# Patient Record
Sex: FEMALE | Race: ASIAN | NOT HISPANIC OR LATINO | Employment: FULL TIME | URBAN - NONMETROPOLITAN AREA
[De-identification: names, ages, dates, MRNs, and addresses within clinical notes are randomized per-mention and may not be internally consistent; named-entity substitution may affect disease eponyms.]

---

## 2018-02-10 ENCOUNTER — HOSPITAL ENCOUNTER (OUTPATIENT)
Facility: MEDICAL CENTER | Age: 53
End: 2018-02-10
Attending: NURSE PRACTITIONER
Payer: COMMERCIAL

## 2018-02-10 ENCOUNTER — OFFICE VISIT (OUTPATIENT)
Dept: URGENT CARE | Facility: PHYSICIAN GROUP | Age: 53
End: 2018-02-10
Payer: COMMERCIAL

## 2018-02-10 VITALS
SYSTOLIC BLOOD PRESSURE: 112 MMHG | BODY MASS INDEX: 28.34 KG/M2 | WEIGHT: 154 LBS | OXYGEN SATURATION: 95 % | DIASTOLIC BLOOD PRESSURE: 66 MMHG | HEART RATE: 76 BPM | HEIGHT: 62 IN | RESPIRATION RATE: 16 BRPM | TEMPERATURE: 98 F

## 2018-02-10 DIAGNOSIS — J45.21 MILD INTERMITTENT ASTHMA WITH ACUTE EXACERBATION: ICD-10-CM

## 2018-02-10 DIAGNOSIS — R30.0 DYSURIA: ICD-10-CM

## 2018-02-10 LAB
APPEARANCE UR: NORMAL
BILIRUB UR STRIP-MCNC: NORMAL MG/DL
COLOR UR AUTO: NORMAL
GLUCOSE UR STRIP.AUTO-MCNC: NORMAL MG/DL
KETONES UR STRIP.AUTO-MCNC: NORMAL MG/DL
LEUKOCYTE ESTERASE UR QL STRIP.AUTO: NORMAL
NITRITE UR QL STRIP.AUTO: NORMAL
PH UR STRIP.AUTO: 6.5 [PH] (ref 5–8)
PROT UR QL STRIP: NORMAL MG/DL
RBC UR QL AUTO: NORMAL
SP GR UR STRIP.AUTO: 1.01
UROBILINOGEN UR STRIP-MCNC: NORMAL MG/DL

## 2018-02-10 PROCEDURE — 81002 URINALYSIS NONAUTO W/O SCOPE: CPT | Performed by: NURSE PRACTITIONER

## 2018-02-10 PROCEDURE — 99214 OFFICE O/P EST MOD 30 MIN: CPT | Mod: 25 | Performed by: NURSE PRACTITIONER

## 2018-02-10 PROCEDURE — 87086 URINE CULTURE/COLONY COUNT: CPT

## 2018-02-10 PROCEDURE — 94640 AIRWAY INHALATION TREATMENT: CPT | Performed by: NURSE PRACTITIONER

## 2018-02-10 RX ORDER — PREDNISONE 20 MG/1
60 TABLET ORAL DAILY
Qty: 15 TAB | Refills: 0 | Status: SHIPPED | OUTPATIENT
Start: 2018-02-10 | End: 2018-04-17

## 2018-02-10 RX ORDER — ALBUTEROL SULFATE 90 UG/1
2 AEROSOL, METERED RESPIRATORY (INHALATION) EVERY 6 HOURS PRN
COMMUNITY
End: 2018-04-17

## 2018-02-10 RX ORDER — IPRATROPIUM BROMIDE AND ALBUTEROL SULFATE 2.5; .5 MG/3ML; MG/3ML
3 SOLUTION RESPIRATORY (INHALATION) ONCE
Status: COMPLETED | OUTPATIENT
Start: 2018-02-10 | End: 2018-02-10

## 2018-02-10 RX ORDER — IPRATROPIUM BROMIDE AND ALBUTEROL SULFATE 2.5; .5 MG/3ML; MG/3ML
3 SOLUTION RESPIRATORY (INHALATION) 4 TIMES DAILY
Qty: 30 BULLET | Refills: 0 | Status: SHIPPED | OUTPATIENT
Start: 2018-02-10 | End: 2018-03-28 | Stop reason: SDUPTHER

## 2018-02-10 RX ADMIN — IPRATROPIUM BROMIDE AND ALBUTEROL SULFATE 3 ML: 2.5; .5 SOLUTION RESPIRATORY (INHALATION) at 13:27

## 2018-02-10 NOTE — PATIENT INSTRUCTIONS
Urinary Tract Infection  A urinary tract infection (UTI) can occur any place along the urinary tract. The tract includes the kidneys, ureters, bladder, and urethra. A type of germ called bacteria often causes a UTI. UTIs are often helped with antibiotic medicine.   HOME CARE   · If given, take antibiotics as told by your doctor. Finish them even if you start to feel better.  · Drink enough fluids to keep your pee (urine) clear or pale yellow.  · Avoid tea, drinks with caffeine, and bubbly (carbonated) drinks.  · Pee often. Avoid holding your pee in for a long time.  · Pee before and after having sex (intercourse).  · Wipe from front to back after you poop (bowel movement) if you are a woman. Use each tissue only once.  GET HELP RIGHT AWAY IF:   · You have back pain.  · You have lower belly (abdominal) pain.  · You have chills.  · You feel sick to your stomach (nauseous).  · You throw up (vomit).  · Your burning or discomfort with peeing does not go away.  · You have a fever.  · Your symptoms are not better in 3 days.  MAKE SURE YOU:   · Understand these instructions.  · Will watch your condition.  · Will get help right away if you are not doing well or get worse.     This information is not intended to replace advice given to you by your health care provider. Make sure you discuss any questions you have with your health care provider.     Document Released: 06/05/2009 Document Revised: 01/08/2016 Document Reviewed: 07/18/2013  [employer] Patient Education ©2016 [employer]    Your medical care was provided today by: SAGRARIO Corea    Thank You for the opportunity to serve you.    You may receive a brief survey in the mail shortly regarding your visit today. Please take a few moments to complete the survey and return it; no postage is necessary. We are working to serve our patient population better, improve customer service and our patients overall experience and your input can help us to  accomplish this. We thank you for your help and for the opportunity to serve you today and in the future.     Special Instructions:  Always call 9-1-1 immediately if you develop a life threatening emergency.    Unless told otherwise please take all medications as directed and complete prescription therapies.     Watch for the following signs that require additional evaluation: progressive lethargy or unresponsiveness, localized pain (chest, abdomen), shortness of breath, painful breathing, progressive vomiting with weakness, bloody stools, or new rash.     If you are prescribed pain medication or any other medication that is sedating, do not take medication before or while operating a vehicle or heavy machinery or equipment due to potential side effects such as drowsiness and/or dizziness.

## 2018-02-10 NOTE — ASSESSMENT & PLAN NOTE
This is a chronic problem. Patient reports for about 1 week she has had some cold-like symptoms. Her voice is hoarse. She has a nebulizer at home, she has been using more frequently over the last week. Typically she only has exercise induced asthma. Denies any recent hospitalization. Reports cough, wheezing. Denies ear pain, sore throat. No fever.

## 2018-02-10 NOTE — PROGRESS NOTES
Subjective:     Kev Garcia is a 52 y.o. female here today for new onset urinary complaints as well as complaints of asthma exacerbation.    This is a new problem.   Dysuria? Yes   Frequency? Yes   Blood in urine? no  Low back pain? no   Fever/chills? No   Vaginal discharge? No   Treatments so far include AZO    Mild intermittent asthma with acute exacerbation  This is a chronic problem. Patient reports for about 1 week she has had some cold-like symptoms. Her voice is hoarse. She has a nebulizer at home, she has been using more frequently over the last week. Typically she only has exercise induced asthma. Denies any recent hospitalization. Reports cough, wheezing. Denies ear pain, sore throat. No fever.     Dysuria  This is a new problem. Patient also reports for the last 3 days she has had some urinary frequency and dysuria.       The patient reports the following genitourinary tract medical history:    Symptoms similar to previous UTIs: yes  Prior Urinary Tract Infections: none in the last year   Prior hospitalizations: No   Prior pyelonephritis: No       Current medicines (including changes today)  Current Outpatient Prescriptions   Medication Sig Dispense Refill   • albuterol (VENTOLIN HFA) 108 (90 Base) MCG/ACT Aero Soln inhalation aerosol Inhale 2 Puffs by mouth every 6 hours as needed for Shortness of Breath.     • predniSONE (DELTASONE) 20 MG Tab Take 3 Tabs by mouth every day. 15 Tab 0   • Levothyroxine Sodium (SYNTHROID PO) Take  by mouth.     • Sertraline HCl (ZOLOFT PO) Take  by mouth.       No current facility-administered medications for this visit.        She  has a past medical history of Anxiety; Asthma; Carotid artery narrowing; and Thyroid disease.    She  has no past surgical history on file.    Family History   Problem Relation Age of Onset   • No Known Problems Mother    • No Known Problems Father    • Asthma Sister          ROS   Positive for urinary frequency, burning with urination. No  "vaginal discharge. Positive for wheezing, hoarse voice, cough.   No fever, no chest pain, no abdominal pain, no rashes    All other systems reviewed and are negative.        Objective:     Blood pressure 112/66, pulse 76, temperature 36.7 °C (98 °F), resp. rate 16, height 1.575 m (5' 2\"), weight 69.9 kg (154 lb), SpO2 93 %. Body mass index is 28.17 kg/m².    Physical Exam:   Constitutional: Alert, no distress.  Eye: Equal, round and reactive, conjunctiva clear, lids normal.  Neck: Trachea midline.  No cervical or supraclavicular lymphadenopathy  Respiratory: Unlabored respiratory effort, lungs clear to auscultation, bilateral wheezes, no ronchi.  Cardiovascular: Normal S1, S2, no murmur, no edema.   Abdomen: Soft, non-tender, no masses, no hepatosplenomegaly. Normal bowel sounds. No  CVAT.   no suprapubic tenderness to palpation.   Skin: Warm, dry, good turgor, no rashes in visible areas.  Psych: Alert and oriented x3, normal affect and mood.        Assessment and Plan:   The following treatment plan was discussed      1. Mild intermittent asthma with acute exacerbation  Patient appears to improve with bed treatment. Will treat with steroid as well. Discussed s/s to seek emergent care.   - ipratropium-albuterol (DUONEB) nebulizer solution 3 mL; 3 mL by Nebulization route Once.  - predniSONE (DELTASONE) 20 MG Tab; Take 3 Tabs by mouth every day.  Dispense: 15 Tab; Refill: 0    2. Dysuria  This was a secondary complaint patient did not make until she was seen by me. UA shows trace blood, however, at this time will await culture to treat. Advised patient if worsening, to RTC. I have advised she est care with a PCP since she does not currently have one.   - POCT Urinalysis  - Urine Culture; Future      Advised to increase fluids  Take medications as directed   Follow up if symptoms persist beyond 3 days.  Follow up sooner if getting worse.  Urine sent for culture and sensitivity--will contact patient if results indicate " need for treatment     Reviewed indication, dosage, usage and potential adverse effects of prescribed medications. Patient appears to understand, verbalizes understanding and is willing to try medications as prescribed.      Reviewed risks and benefits of treatment plan. Patient verbally agrees to plan of care.       Followup: Return if symptoms worsen or fail to improve.    NAVID Velez.     PLEASE NOTE: This dictation was created using voice recognition software. I have made every reasonable attempt to correct obvious errors, but I expect that there may be errors of grammar and possibly content that I did not discover prior finalizing this note.

## 2018-02-10 NOTE — ASSESSMENT & PLAN NOTE
This is a new problem. Patient also reports for the last 3 days she has had some urinary frequency and dysuria.

## 2018-02-12 DIAGNOSIS — R30.0 DYSURIA: ICD-10-CM

## 2018-02-14 LAB
BACTERIA UR CULT: NORMAL
SIGNIFICANT IND 70042: NORMAL
SITE SITE: NORMAL
SOURCE SOURCE: NORMAL

## 2018-03-28 ENCOUNTER — OFFICE VISIT (OUTPATIENT)
Dept: URGENT CARE | Facility: PHYSICIAN GROUP | Age: 53
End: 2018-03-28
Payer: COMMERCIAL

## 2018-03-28 VITALS
BODY MASS INDEX: 29.26 KG/M2 | OXYGEN SATURATION: 96 % | WEIGHT: 159 LBS | TEMPERATURE: 97.4 F | HEART RATE: 92 BPM | HEIGHT: 62 IN | RESPIRATION RATE: 14 BRPM | DIASTOLIC BLOOD PRESSURE: 86 MMHG | SYSTOLIC BLOOD PRESSURE: 122 MMHG

## 2018-03-28 DIAGNOSIS — J45.21 MILD INTERMITTENT ASTHMA WITH ACUTE EXACERBATION: ICD-10-CM

## 2018-03-28 PROCEDURE — 99214 OFFICE O/P EST MOD 30 MIN: CPT | Mod: 25 | Performed by: PHYSICIAN ASSISTANT

## 2018-03-28 RX ORDER — IPRATROPIUM BROMIDE AND ALBUTEROL SULFATE 2.5; .5 MG/3ML; MG/3ML
3 SOLUTION RESPIRATORY (INHALATION) 4 TIMES DAILY
Qty: 30 BULLET | Refills: 0 | Status: SHIPPED | OUTPATIENT
Start: 2018-03-28 | End: 2018-04-17

## 2018-03-28 RX ORDER — ALBUTEROL SULFATE 90 UG/1
1-2 AEROSOL, METERED RESPIRATORY (INHALATION) EVERY 4 HOURS PRN
Qty: 1 INHALER | Refills: 0 | Status: SHIPPED | OUTPATIENT
Start: 2018-03-28 | End: 2018-10-15 | Stop reason: SDUPTHER

## 2018-03-28 RX ORDER — METHYLPREDNISOLONE SODIUM SUCCINATE 125 MG/2ML
125 INJECTION, POWDER, LYOPHILIZED, FOR SOLUTION INTRAMUSCULAR; INTRAVENOUS ONCE
Status: COMPLETED | OUTPATIENT
Start: 2018-03-28 | End: 2018-03-28

## 2018-03-28 RX ORDER — AZITHROMYCIN 250 MG/1
TABLET, FILM COATED ORAL
Qty: 6 TAB | Refills: 0 | Status: SHIPPED | OUTPATIENT
Start: 2018-03-28 | End: 2018-04-17

## 2018-03-28 RX ORDER — PREDNISONE 20 MG/1
TABLET ORAL
Qty: 10 TAB | Refills: 0 | Status: SHIPPED | OUTPATIENT
Start: 2018-03-28 | End: 2018-04-17

## 2018-03-28 RX ADMIN — METHYLPREDNISOLONE SODIUM SUCCINATE 125 MG: 125 INJECTION, POWDER, LYOPHILIZED, FOR SOLUTION INTRAMUSCULAR; INTRAVENOUS at 13:30

## 2018-03-28 ASSESSMENT — ENCOUNTER SYMPTOMS
FREQUENT THROAT CLEARING: 0
HOARSE VOICE: 0
DIFFICULTY BREATHING: 1
SORE THROAT: 1
HEMOPTYSIS: 0
WHEEZING: 1
COUGH: 1
SPUTUM PRODUCTION: 0
RHINORRHEA: 1
CHEST TIGHTNESS: 1
SHORTNESS OF BREATH: 1

## 2018-03-28 NOTE — PROGRESS NOTES
"Subjective:      Kev Garcia is a 52 y.o. female who presents with Asthma            Asthma   She complains of chest tightness, cough, difficulty breathing, shortness of breath and wheezing. There is no frequent throat clearing, hemoptysis, hoarse voice or sputum production. This is a new problem. The current episode started 1 to 4 weeks ago. The problem occurs intermittently. The problem has been waxing and waning. The cough is non-productive. Associated symptoms include ear congestion, nasal congestion, rhinorrhea and a sore throat. Her symptoms are aggravated by URI. Her symptoms are alleviated by beta-agonist and oral steroids. She reports moderate improvement on treatment. Her past medical history is significant for asthma.       Review of Systems   HENT: Positive for rhinorrhea and sore throat. Negative for hoarse voice.    Respiratory: Positive for cough, shortness of breath and wheezing. Negative for hemoptysis and sputum production.           Objective:     /86   Pulse 92   Temp 36.3 °C (97.4 °F)   Resp 14   Ht 1.575 m (5' 2\")   Wt 72.1 kg (159 lb)   SpO2 96%   BMI 29.08 kg/m²      Physical Exam       Gen.: Patient is A and O ×3, no acute distress, well-nourished well-hydrated  Vitals: Are listed and unremarkable  HEENT: Heads normocephalic, atraumatic, PERRLA, extraocular movements intact, TMs and oropharynx clear  Neck: Soft supple without cervical lymphadenopathy  Cardiovascular: Regular rate and rhythm normal S1 and S2. No murmurs, rubs or gallops  Lungs are clear to auscultation bilaterally. no wheezes rales or rhonchi  Abdomen is soft, nontender, nondistended with good bowel sounds, no hepatosplenomegaly  Skin: Is well perfused without evidence of rash or lesions  Neurological:  cranial nerves II through XII were assessed and intact.  Musculoskeletal: Full range of motion, 5 out of 5 strength against resistance  Neurovascularly: Intact with a 2 second cap refill, good distal " pulses      Urgent care course: solumedrol 125mg IMx1 given. Patient defers breathing treatment here     Assessment/Plan:     1. Mild intermittent asthma with acute exacerbation    - ipratropium-albuterol (DUONEB) 0.5-2.5 (3) MG/3ML nebulizer solution; 3 mL by Nebulization route 4 times a day.  Dispense: 30 Bullet; Refill: 0  - predniSONE (DELTASONE) 20 MG Tab; Take one pill twice a day for five days  Dispense: 10 Tab; Refill: 0  - azithromycin (ZITHROMAX) 250 MG Tab; Take two tablets on day one, then on tablet the following four days  Dispense: 6 Tab; Refill: 0  - albuterol 108 (90 Base) MCG/ACT Aero Soln inhalation aerosol; Inhale 1-2 Puffs by mouth every four hours as needed for Shortness of Breath.  Dispense: 1 Inhaler; Refill: 0  - methylPREDNISolone sod succ (SOLU-MEDROL) 125 MG injection 125 mg; 2 mL by Intramuscular route Once.

## 2018-04-17 ENCOUNTER — TELEPHONE (OUTPATIENT)
Dept: URGENT CARE | Facility: PHYSICIAN GROUP | Age: 53
End: 2018-04-17

## 2018-04-17 ENCOUNTER — APPOINTMENT (OUTPATIENT)
Dept: RADIOLOGY | Facility: IMAGING CENTER | Age: 53
End: 2018-04-17
Attending: NURSE PRACTITIONER
Payer: COMMERCIAL

## 2018-04-17 ENCOUNTER — OFFICE VISIT (OUTPATIENT)
Dept: MEDICAL GROUP | Facility: PHYSICIAN GROUP | Age: 53
End: 2018-04-17
Payer: COMMERCIAL

## 2018-04-17 VITALS
WEIGHT: 154 LBS | HEIGHT: 62 IN | BODY MASS INDEX: 28.34 KG/M2 | TEMPERATURE: 98.4 F | SYSTOLIC BLOOD PRESSURE: 122 MMHG | DIASTOLIC BLOOD PRESSURE: 70 MMHG | RESPIRATION RATE: 14 BRPM | HEART RATE: 112 BPM | OXYGEN SATURATION: 95 %

## 2018-04-17 DIAGNOSIS — F41.9 ANXIETY: ICD-10-CM

## 2018-04-17 DIAGNOSIS — R05.9 COUGH: ICD-10-CM

## 2018-04-17 DIAGNOSIS — J45.41 MODERATE PERSISTENT ASTHMA WITH ACUTE EXACERBATION: ICD-10-CM

## 2018-04-17 DIAGNOSIS — R61 NIGHT SWEAT: ICD-10-CM

## 2018-04-17 DIAGNOSIS — Z13.21 ENCOUNTER FOR VITAMIN DEFICIENCY SCREENING: ICD-10-CM

## 2018-04-17 DIAGNOSIS — E03.9 ACQUIRED HYPOTHYROIDISM: ICD-10-CM

## 2018-04-17 DIAGNOSIS — Z13.6 SCREENING FOR CARDIOVASCULAR CONDITION: ICD-10-CM

## 2018-04-17 PROCEDURE — 99214 OFFICE O/P EST MOD 30 MIN: CPT | Performed by: NURSE PRACTITIONER

## 2018-04-17 PROCEDURE — 71046 X-RAY EXAM CHEST 2 VIEWS: CPT | Mod: TC,FY | Performed by: NURSE PRACTITIONER

## 2018-04-17 RX ORDER — FLUTICASONE PROPIONATE 220 UG/1
1 AEROSOL, METERED RESPIRATORY (INHALATION) 2 TIMES DAILY
Qty: 1 INHALER | Refills: 4 | Status: SHIPPED | OUTPATIENT
Start: 2018-04-17 | End: 2018-04-17 | Stop reason: CLARIF

## 2018-04-17 RX ORDER — IPRATROPIUM BROMIDE AND ALBUTEROL SULFATE 2.5; .5 MG/3ML; MG/3ML
3 SOLUTION RESPIRATORY (INHALATION) 4 TIMES DAILY
Qty: 30 BULLET | Refills: 2 | Status: SHIPPED | OUTPATIENT
Start: 2018-04-17 | End: 2022-07-20 | Stop reason: SDUPTHER

## 2018-04-17 NOTE — TELEPHONE ENCOUNTER
Patient's FLovent inhaler cost $400 at the pharmacy, patient is wondering if she could have it changed to advair which only costs $50.00. Please advise thank you.

## 2018-04-17 NOTE — ASSESSMENT & PLAN NOTE
Patient reports she has had exercise-induced asthma for years.  Since she had pneumonia 3 years ago, she has been getting asthma exacerbations with respiratory illness.  She has been to urgent care twice last 2 months. Last urgent care visit, she was given an IM injection of steroid and prescribed oral prednisone, azithromycin, and DuoNeb nebulizer.  Patient reports she had temporary relief until taking oral prednisone. She reports her cough has returned and is triggered by deep breath, laughing, dry throat.  She reports that her cough gets so severe, causing her nose to bleed.  She has been using both duoneb nebulizer and albuterol inhaler. Patient has been using both multiple times a day with some relief. I did explain to patient that she should either use DuoNeb nebulizer or albuterol inhaler every 4 hours, not both.  Her heart rate is 108, likely due to albuterol. Patient denies chest pain, lightheadedness.  Patient denies shortness of breath with exertion. We'll get chest x-ray. Will have patient start Flovent inhaler routinely. Patient also reports new onset of night sweats that soak pillowcase. Will also get a Quantiferon Gold.  Patient has not traveled out of country recently, but did just travel to [place].  No one else at home is ill.

## 2018-04-17 NOTE — PROGRESS NOTES
CC: To establish care and for cough, medication refills for anxiety and hypothyroid.    HISTORY OF THE PRESENT ILLNESS: Patient is a 52 y.o. female. This pleasant patient is here today to establish care and for cough, and medication refills for anxiety and hypothyroid.      Moderate persistent asthma with acute exacerbation  Patient reports she has had exercise-induced asthma for years.  Since she had pneumonia 3 years ago, she has been getting asthma exacerbations with respiratory illness.  She has been to urgent care twice last 2 months. Last urgent care visit, she was given an IM injection of steroid and prescribed oral prednisone, azithromycin, and DuoNeb nebulizer.  Patient reports she had temporary relief until taking oral prednisone. She reports her cough has returned and is triggered by deep breath, laughing, dry throat.  She reports that her cough gets so severe, causing her nose to bleed.  She has been using both duoneb nebulizer and albuterol inhaler. Patient has been using both multiple times a day with some relief. I did explain to patient that she should either use DuoNeb nebulizer or albuterol inhaler every 4 hours, not both.  Her heart rate is 108, likely due to albuterol. Patient denies chest pain, lightheadedness.  Patient denies shortness of breath with exertion. We'll get chest x-ray. Will have patient start Flovent inhaler routinely. Patient also reports new onset of night sweats that soak pillowcase. Will also get a Quantiferon Gold.  Patient has not traveled out of country recently, but did just travel to [place].  No one else at home is ill.    Anxiety  Patient reports this is a chronic problem that is well-controlled with sertraline 50 mg daily. Patient reports she has been taking this for a few years. She denies side effects and suicidal thoughts.    Acquired hypothyroidism  Patient reports this is a chronic problem that has been well controlled with levothyroxine 137 µg daily. She denies  constipation, cold intolerance, brain fog, skin and hair changes. She reports her last TSH was about a year ago. Will get updated TSH before refilling medication. Patient reports she has 5 days left of medication.      Allergies: Patient has no known allergies.    Current Outpatient Prescriptions [facility] in [facility]   Medication Sig Dispense Refill   • fluticasone (FLOVENT HFA) 220 MCG/ACT Aerosol Inhale 1 Puff by mouth 2 times a day. 1 Inhaler 4   • ipratropium-albuterol (DUONEB) 0.5-2.5 (3) MG/3ML nebulizer solution 3 mL by Nebulization route 4 times a day. 30 Bullet 2   • sertraline (ZOLOFT) 50 MG Tab Take 1 Tab by mouth every day. 30 Tab 11   • albuterol 108 (90 Base) MCG/ACT Aero Soln inhalation aerosol Inhale 1-2 Puffs by mouth every four hours as needed for Shortness of Breath. 1 Inhaler 0   • Levothyroxine Sodium (SYNTHROID PO) Take  by mouth.       No current Epic-ordered facility-administered medications on file.        Past Medical History:   Diagnosis Date   • Anxiety    • Asthma    • Carotid artery narrowing    • Thyroid disease        No past surgical history on file.    Social History   Substance Use Topics   • Smoking status: Never Smoker   • Smokeless tobacco: Never Used   • Alcohol use Yes      Comment: every few days       Family History   Problem Relation Age of Onset   • No Known Problems Mother    • No Known Problems Father    • Asthma Sister        ROS:     - Constitutional: Negative for fever, chills, unexpected weight change, and fatigue/generalized weakness.     - HEENT: Negative for headaches, vision changes, hearing changes, ear pain, rhinorrhea, sinus congestion, and sore throat.       - Cardiovascular: Negative for chest pain, palpitations,  and bilateral lower extremity edema.     - Gastrointestinal: Negative for nausea, vomiting, abdominal pain, diarrhea, constipation.     - Genitourinary: Negative for dysuria.    - Musculoskeletal: Negative for myalgias.     - Skin: Negative for rash.     "         Exam: Blood pressure 122/70, pulse (!) 112, temperature 36.9 °C (98.4 °F), resp. rate 14, height 1.575 m (5' 2\"), weight 69.9 kg (154 lb), SpO2 95 %. Body mass index is 28.17 kg/m².    General: Alert, pleasant, well nourished, well developed female in NAD  HEENT: Normocephalic. Eyes conjunctiva clear lids without ptosis, pupils equal and reactive to light, ears normal shape and contour, canals are clear bilaterally, tympanic membranes are pearly gray with good light reflex, nasal mucosa without erythema and drainage, oropharynx is without erythema, edema or exudates.   Neck: Supple without bruit. Thyroid is not enlarged.  Pulmonary:  Faint scattered expiratory wheeze.  Normal effort. No rales, ronchi.  Dry cough on exam.  Cardiovascular: Normal rate and rhythm without murmur. Carotid and radial pulses are intact and equal bilaterally.  No lower extremity edema.  Lymph: No cervical or supraclavicular lymph nodes are palpable  Skin: Warm and dry.  No obvious lesions.  Psych: Normal mood and affect. Alert and oriented. Judgment and insight is normal.    Please note that this dictation was created using voice recognition software. I have made every reasonable attempt to correct obvious errors, but I expect that there are errors of grammar and possibly content that I did not discover before finalizing the note.      Assessment/Plan  1. Moderate persistent asthma with acute exacerbation  Patient has been prescribed oral steroids twice in the past 2 months, so I am hesitant to prescribe again so soon. Patient will start Flovent inhaler twice a day, in addition to either albuterol inhaler or duoneb nebulizer.  Reviewed with patient instructions for flovent inhaler and to rinse mouth after use to prevent thrush.  Chest xray showed no active disease.  - fluticasone (FLOVENT HFA) 220 MCG/ACT Aerosol; Inhale 1 Puff by mouth 2 times a day.  Dispense: 1 Inhaler; Refill: 4  - REFERRAL TO PULMONOLOGY  - " ipratropium-albuterol (DUONEB) 0.5-2.5 (3) MG/3ML nebulizer solution; 3 mL by Nebulization route 4 times a day.  Dispense: 30 Bullet; Refill: 2  - DX-CHEST-2 VIEWS; Future    2. Anxiety  Continue with sertraline. We'll review lab results and patient.  - VITAMIN D,25 HYDROXY; Future  - sertraline (ZOLOFT) 50 MG Tab; Take 1 Tab by mouth every day.  Dispense: 30 Tab; Refill: 11    3. Acquired hypothyroidism  Will refill her levothyroxine after get updated TSH level.  - TSH; Future  - FREE THYROXINE; Future    4. Screening for cardiovascular condition  Will review results with patient.  - LIPID PROFILE; Future  - COMP METABOLIC PANEL; Future    5. Encounter for vitamin deficiency screening  Will review results with patient.  - VITAMIN D,25 HYDROXY; Future    6. Cough  Will notify patient of results.  - Quantiferon Gold TB (PPD); Future  - DX-CHEST-2 VIEWS; Future    7. Night sweat  - Quantiferon Gold TB (PPD); Future    Patient will follow up in 3 weeks for cough or sooner if needed.

## 2018-04-18 ENCOUNTER — HOSPITAL ENCOUNTER (OUTPATIENT)
Dept: LAB | Facility: MEDICAL CENTER | Age: 53
End: 2018-04-18
Attending: NURSE PRACTITIONER
Payer: COMMERCIAL

## 2018-04-18 DIAGNOSIS — F41.9 ANXIETY: ICD-10-CM

## 2018-04-18 DIAGNOSIS — R61 NIGHT SWEATS: ICD-10-CM

## 2018-04-18 DIAGNOSIS — E03.9 ACQUIRED HYPOTHYROIDISM: ICD-10-CM

## 2018-04-18 DIAGNOSIS — Z13.21 ENCOUNTER FOR VITAMIN DEFICIENCY SCREENING: ICD-10-CM

## 2018-04-18 DIAGNOSIS — Z13.6 SCREENING FOR CARDIOVASCULAR CONDITION: ICD-10-CM

## 2018-04-18 LAB
25(OH)D3 SERPL-MCNC: 19 NG/ML (ref 30–100)
ALBUMIN SERPL BCP-MCNC: 4.2 G/DL (ref 3.2–4.9)
ALBUMIN/GLOB SERPL: 1.3 G/DL
ALP SERPL-CCNC: 61 U/L (ref 30–99)
ALT SERPL-CCNC: 32 U/L (ref 2–50)
ANION GAP SERPL CALC-SCNC: 8 MMOL/L (ref 0–11.9)
AST SERPL-CCNC: 33 U/L (ref 12–45)
BILIRUB SERPL-MCNC: 0.4 MG/DL (ref 0.1–1.5)
BUN SERPL-MCNC: 15 MG/DL (ref 8–22)
CALCIUM SERPL-MCNC: 9.1 MG/DL (ref 8.5–10.5)
CHLORIDE SERPL-SCNC: 104 MMOL/L (ref 96–112)
CHOLEST SERPL-MCNC: 220 MG/DL (ref 100–199)
CO2 SERPL-SCNC: 26 MMOL/L (ref 20–33)
CREAT SERPL-MCNC: 0.63 MG/DL (ref 0.5–1.4)
GLOBULIN SER CALC-MCNC: 3.2 G/DL (ref 1.9–3.5)
GLUCOSE SERPL-MCNC: 102 MG/DL (ref 65–99)
HDLC SERPL-MCNC: 73 MG/DL
LDLC SERPL CALC-MCNC: 114 MG/DL
POTASSIUM SERPL-SCNC: 4.8 MMOL/L (ref 3.6–5.5)
PROT SERPL-MCNC: 7.4 G/DL (ref 6–8.2)
SODIUM SERPL-SCNC: 138 MMOL/L (ref 135–145)
T4 FREE SERPL-MCNC: 0.89 NG/DL (ref 0.53–1.43)
TRIGL SERPL-MCNC: 164 MG/DL (ref 0–149)
TSH SERPL DL<=0.005 MIU/L-ACNC: 8.45 UIU/ML (ref 0.38–5.33)

## 2018-04-18 PROCEDURE — 86480 TB TEST CELL IMMUN MEASURE: CPT

## 2018-04-18 PROCEDURE — 80053 COMPREHEN METABOLIC PANEL: CPT

## 2018-04-18 PROCEDURE — 80061 LIPID PANEL: CPT

## 2018-04-18 PROCEDURE — 36415 COLL VENOUS BLD VENIPUNCTURE: CPT

## 2018-04-18 PROCEDURE — 84443 ASSAY THYROID STIM HORMONE: CPT

## 2018-04-18 PROCEDURE — 82306 VITAMIN D 25 HYDROXY: CPT

## 2018-04-18 PROCEDURE — 84439 ASSAY OF FREE THYROXINE: CPT

## 2018-04-18 NOTE — ASSESSMENT & PLAN NOTE
Patient reports this is a chronic problem that is well-controlled with sertraline 50 mg daily. Patient reports she has been taking this for a few years. She denies side effects and suicidal thoughts.

## 2018-04-18 NOTE — TELEPHONE ENCOUNTER
Please call patient after that I sent over prescription for Advair.  Please also let her know that her chest x-ray was normal.  Could you also set up appointment for her to see me in 3 weeks. Not sure why this was not done before she left today.   Thanks!

## 2018-04-18 NOTE — ASSESSMENT & PLAN NOTE
Patient reports this is a chronic problem that has been well controlled with levothyroxine 137 µg daily. She denies constipation, cold intolerance, brain fog, skin and hair changes. She reports her last TSH was about a year ago. Will get updated TSH before refilling medication. Patient reports she has 5 days left of medication.

## 2018-04-19 ENCOUNTER — TELEPHONE (OUTPATIENT)
Dept: MEDICAL GROUP | Facility: PHYSICIAN GROUP | Age: 53
End: 2018-04-19

## 2018-04-19 DIAGNOSIS — E03.9 HYPOTHYROIDISM, UNSPECIFIED TYPE: ICD-10-CM

## 2018-04-19 RX ORDER — LEVOTHYROXINE SODIUM 0.15 MG/1
150 TABLET ORAL
Qty: 90 TAB | Refills: 0 | Status: SHIPPED | OUTPATIENT
Start: 2018-04-19 | End: 2018-07-26 | Stop reason: SDUPTHER

## 2018-04-19 NOTE — TELEPHONE ENCOUNTER
Called [phone] (home) left voice message for patient to call [phone] to go over provider notes.

## 2018-04-19 NOTE — TELEPHONE ENCOUNTER
Called [phone] (home) left voice message for patient to call [phone] to go over provider notes.

## 2018-04-19 NOTE — TELEPHONE ENCOUNTER
Please call patient and let her know that her TSH (thyroid stimulating hormone) was elevated.  I have sent a prescription for an increased dose of levothyroxine to [facility]'s pharmacy.    Please tell patient to have her blood drawn in 10 weeks, so we can see where her TSH is.  I have sent order to pharmacy.  I will send a letter with her results.  Thanks!

## 2018-04-20 LAB
M TB TUBERC IFN-G BLD QL: NEGATIVE
M TB TUBERC IFN-G/MITOGEN IGNF BLD: -0
M TB TUBERC IGNF/MITOGEN IGNF CONTROL: 46.68 [IU]/ML
MITOGEN IGNF BCKGRD COR BLD-ACNC: 0.03 [IU]/ML

## 2018-05-01 ENCOUNTER — HOSPITAL ENCOUNTER (OUTPATIENT)
Dept: RADIOLOGY | Facility: MEDICAL CENTER | Age: 53
End: 2018-05-01
Attending: OBSTETRICS & GYNECOLOGY
Payer: COMMERCIAL

## 2018-05-01 DIAGNOSIS — Z12.31 VISIT FOR SCREENING MAMMOGRAM: ICD-10-CM

## 2018-05-01 PROCEDURE — 77067 SCR MAMMO BI INCL CAD: CPT

## 2018-05-08 ENCOUNTER — OFFICE VISIT (OUTPATIENT)
Dept: MEDICAL GROUP | Facility: PHYSICIAN GROUP | Age: 53
End: 2018-05-08
Payer: COMMERCIAL

## 2018-05-08 VITALS
SYSTOLIC BLOOD PRESSURE: 118 MMHG | HEART RATE: 74 BPM | OXYGEN SATURATION: 95 % | BODY MASS INDEX: 28.82 KG/M2 | HEIGHT: 62 IN | RESPIRATION RATE: 12 BRPM | DIASTOLIC BLOOD PRESSURE: 78 MMHG | TEMPERATURE: 97.6 F | WEIGHT: 156.6 LBS

## 2018-05-08 DIAGNOSIS — E03.9 ACQUIRED HYPOTHYROIDISM: ICD-10-CM

## 2018-05-08 DIAGNOSIS — E55.9 VITAMIN D INSUFFICIENCY: ICD-10-CM

## 2018-05-08 DIAGNOSIS — E78.5 DYSLIPIDEMIA: ICD-10-CM

## 2018-05-08 DIAGNOSIS — F41.9 ANXIETY: ICD-10-CM

## 2018-05-08 DIAGNOSIS — J45.41 MODERATE PERSISTENT ASTHMA WITH ACUTE EXACERBATION: ICD-10-CM

## 2018-05-08 PROBLEM — R30.0 DYSURIA: Status: RESOLVED | Noted: 2018-02-10 | Resolved: 2018-05-08

## 2018-05-08 PROCEDURE — 99214 OFFICE O/P EST MOD 30 MIN: CPT | Performed by: NURSE PRACTITIONER

## 2018-05-08 ASSESSMENT — PATIENT HEALTH QUESTIONNAIRE - PHQ9: CLINICAL INTERPRETATION OF PHQ2 SCORE: 0

## 2018-05-08 NOTE — ASSESSMENT & PLAN NOTE
This is new onset per patient. We discussed lifestyle modifications to help lower cholesterol including diet, routine aerobic exercise, and weight loss. Patient does not smoke. She would like to work, still measures prior to starting medication.  Component      Latest Ref Rng & Units 4/18/2018           6:49 AM   Cholesterol,Tot      100 - 199 mg/dL 220 (H)   Triglycerides      0 - 149 mg/dL 164 (H)   HDL      >=40 mg/dL 73   LDL      <100 mg/dL 114 (H)

## 2018-05-08 NOTE — ASSESSMENT & PLAN NOTE
This is a chronic health problem for patient.  Last TSH was 8.45.  Levothyroxine dose was subsequently increased to 150 mcg.  Patient will have TSH checked in 10 weeks.

## 2018-05-08 NOTE — ASSESSMENT & PLAN NOTE
This is new onset per patient. Serum vitamin D level is 19. Discussed with patient and stressed importance of vitamin D on calcium absorption. Patient will start taking vitamin D 4000 international units daily.

## 2018-05-08 NOTE — PROGRESS NOTES
CC: Follow-up on asthma and cough    HISTORY OF THE PRESENT ILLNESS: Patient is a 52 y.o. female. This pleasant patient is here today to follow-up on asthma and persistent cough.    Health Maintenance: Patient reports she had colonoscopy in 2016, which was normal. She does not remember the name of the gastroenterology clinic, she quit she will bring in next time and we will get KARIE. Patient reports she has not had Pap smear in a few years. She will have routine screening Pap smear done at next appointment.      Moderate persistent asthma with acute exacerbation  This is a relatively new onset per patient for which she was prescribed Flovent at her last appointment. Her insurance would not cover this, but would cover Advair. She has been using her Advair inhaler twice a day for the last 3 weeks. She reports in the last 2 weeks she has not had to use her rescue inhaler at all. She reports her cough is much improved. She did have chest x-ray at last appointment, which showed no active disease. Quantiferon Gold test was negative. Patient does have a consultation appointment with pulmonology in 4 months. Patient reports though her insurance covered the Advair inhaler, she still had to pay over $300. Patient will contact insurance to find out what glucocorticoid steroid inhaler is covered so she does not have to pay a big co-pay.      Acquired hypothyroidism  This is a chronic health problem for patient.  Last TSH was 8.45.  Levothyroxine dose was subsequently increased to 150 mcg.  Patient will have TSH checked in 10 weeks.    Vitamin D insufficiency  This is new onset per patient. Serum vitamin D level is 19. Discussed with patient and stressed importance of vitamin D on calcium absorption. Patient will start taking vitamin D 4000 international units daily.      Allergies: Patient has no known allergies.    Current Outpatient Prescriptions Ordered in [facility]   Medication Sig Dispense Refill   • sertraline (ZOLOFT) 50 MG  "Tab Take 1 Tab by mouth every day. 90 Tab 3   • levothyroxine (SYNTHROID) 150 MCG Tab Take 1 Tab by mouth Every morning on an empty stomach. 90 Tab 0   • fluticasone-salmeterol (ADVAIR) 250-50 MCG/DOSE AEROSOL POWDER, BREATH ACTIVATED Inhale 1 Puff by mouth every 12 hours. 1 Inhaler 5   • ipratropium-albuterol (DUONEB) 0.5-2.5 (3) MG/3ML nebulizer solution 3 mL by Nebulization route 4 times a day. 30 Bullet 2   • albuterol 108 (90 Base) MCG/ACT Aero Soln inhalation aerosol Inhale 1-2 Puffs by mouth every four hours as needed for Shortness of Breath. 1 Inhaler 0     No current Epic-ordered facility-administered medications on file.        Past Medical History:   Diagnosis Date   • Anxiety    • Asthma    • Carotid artery narrowing    • Thyroid disease        No past surgical history on file.    Social History   Substance Use Topics   • Smoking status: Never Smoker   • Smokeless tobacco: Never Used   • Alcohol use 0.6 oz/week     1 Shots of liquor per week      Comment: every few days       Family History   Problem Relation Age of Onset   • Hyperlipidemia Mother    • No Known Problems Father    • Asthma Sister        ROS:   Negative for chest pain, abdominal pain, dyspnea         Exam: Blood pressure 118/78, pulse 74, temperature 36.4 °C (97.6 °F), resp. rate 12, height 1.575 m (5' 2\"), weight 71 kg (156 lb 9.6 oz), last menstrual period 04/23/2018, SpO2 95 %, not currently breastfeeding. Body mass index is 28.64 kg/m².    General: Alert, delightful, well nourished, well developed female in NAD  Pulmonary: Clear to ausculation.  Normal effort. No rales, ronchi, or wheezing.  Cardiovascular: Normal rate and rhythm without murmur.   Psych: Normal mood and affect. Alert and oriented. Judgment and insight is normal.    Please note that this dictation was created using voice recognition software. I have made every reasonable attempt to correct obvious errors, but I expect that there are errors of grammar and possibly " content that I did not discover before finalizing the note.      Assessment/Plan  1. Moderate persistent asthma with acute exacerbation  Continue with Advair inhaler.  Will get chest xray prior to pulmonology consultation.  - DX-CHEST-2 VIEWS; Future    2. Anxiety  Well controlled.  - sertraline (ZOLOFT) 50 MG Tab; Take 1 Tab by mouth every day.  Dispense: 90 Tab; Refill: 3    3. Acquired hypothyroidism  Continue with levothyroxine.  Get repeat TSH in 10 weeks.    4. Vitamin D insufficiency  Start supplemental vitamin D    5. Dyslipidemia  Patient will work on lifestyle modification.    Lab results were reviewed with patient. Patient will return to clinic in 2 weeks for routine screening Pap smear.

## 2018-05-08 NOTE — ASSESSMENT & PLAN NOTE
This is a relatively new onset per patient for which she was prescribed Flovent at her last appointment. Her insurance would not cover this, but would cover Advair. She has been using her Advair inhaler twice a day for the last 3 weeks. She reports in the last 2 weeks she has not had to use her rescue inhaler at all. She reports her cough is much improved. She did have chest x-ray at last appointment, which showed no active disease. Quantiferon Gold test was negative. Patient does have a consultation appointment with pulmonology in 4 months. Patient reports though her insurance covered the Advair inhaler, she still had to pay over $300. Patient will contact insurance to find out what glucocorticoid steroid inhaler is covered so she does not have to pay a big co-pay.

## 2018-05-25 ENCOUNTER — OFFICE VISIT (OUTPATIENT)
Dept: MEDICAL GROUP | Facility: PHYSICIAN GROUP | Age: 53
End: 2018-05-25
Payer: COMMERCIAL

## 2018-05-25 ENCOUNTER — HOSPITAL ENCOUNTER (OUTPATIENT)
Facility: MEDICAL CENTER | Age: 53
End: 2018-05-25
Attending: NURSE PRACTITIONER
Payer: COMMERCIAL

## 2018-05-25 VITALS
WEIGHT: 159.2 LBS | HEIGHT: 62 IN | TEMPERATURE: 98.2 F | OXYGEN SATURATION: 97 % | SYSTOLIC BLOOD PRESSURE: 122 MMHG | RESPIRATION RATE: 16 BRPM | BODY MASS INDEX: 29.3 KG/M2 | HEART RATE: 80 BPM | DIASTOLIC BLOOD PRESSURE: 82 MMHG

## 2018-05-25 DIAGNOSIS — Z12.4 SCREENING FOR CERVICAL CANCER: ICD-10-CM

## 2018-05-25 DIAGNOSIS — Z01.419 WELL WOMAN EXAM WITH ROUTINE GYNECOLOGICAL EXAM: ICD-10-CM

## 2018-05-25 PROCEDURE — 87624 HPV HI-RISK TYP POOLED RSLT: CPT

## 2018-05-25 PROCEDURE — 88175 CYTOPATH C/V AUTO FLUID REDO: CPT

## 2018-05-25 PROCEDURE — 99396 PREV VISIT EST AGE 40-64: CPT | Performed by: NURSE PRACTITIONER

## 2018-05-25 NOTE — PROGRESS NOTES
SUBJECTIVE: 52 y.o. female for annual routine gynecologic exam    Chief Complaint   Patient presents with   • Gynecologic Exam       Obstetric History       T0      L0     SAB0   TAB0   Ectopic0   Molar0   Multiple0   Live Births0       Last Pap: 2 or 3 years ago  History   Sexual Activity   • Sexual activity: Yes Yes   • Partners: Male, in a monogomous relationship many years Male     H/O Abnormal Pap no  She  reports that she has never smoked. She has never used smokeless tobacco.        Allergies: Patient has no known allergies.     ROS:    Menses every month with 30 days moderate bleeding.  Cramping is mild.   She does take OTC analgesics for cramping  Reports mild menopause symptoms of hot flashes, night sweats, sleep disruption, mood changes, vaginal dryness.   No significant bloating/fluid retention, pelvic pain, or dyspareunia. No vaginal discharge   No breast tenderness, mass, nipple discharge, changes in size or contour, or abnormal cyclic discomfort.  No urinary tract symptoms, no incontinence.   No abdominal pain, change in bowel habits, black or bloody stools.    No unusual headaches, no visual changes, menstrual migraines   No prolonged cough. No dyspnea or chest pain on exertion.  No depression, labile mood, anxiety ,libido changes, insomnia.  No new/concerning skin lesions, concerns.     Exercise: no regular exercise program  Preventive Care:  Health Maintenance Topics with due status: Overdue       Topic Date Due    IMM DTaP/Tdap/Td Vaccine 1984    IMM PNEUMOCOCCAL 19-64 (ADULT) MEDIUM RISK SERIES 1984    PAP SMEAR 1986    COLONOSCOPY 2015       Current medicines (including changes today)  Current Outpatient Prescriptions   Medication Sig Dispense Refill   • sertraline (ZOLOFT) 50 MG Tab Take 1 Tab by mouth every day. 90 Tab 3   • levothyroxine (SYNTHROID) 150 MCG Tab Take 1 Tab by mouth Every morning on an empty stomach. 90 Tab 0   • ipratropium-albuterol  "(DUONEB) 0.5-2.5 (3) MG/3ML nebulizer solution 3 mL by Nebulization route 4 times a day. 30 Bullet 2   • fluticasone-salmeterol (ADVAIR) 250-50 MCG/DOSE AEROSOL POWDER, BREATH ACTIVATED Inhale 1 Puff by mouth every 12 hours. 1 Inhaler 5   • albuterol 108 (90 Base) MCG/ACT Aero Soln inhalation aerosol Inhale 1-2 Puffs by mouth every four hours as needed for Shortness of Breath. 1 Inhaler 0     No current facility-administered medications for this visit.      She  has a past medical history of Anxiety; Asthma; Carotid artery narrowing; and Thyroid disease.  She  has no past surgical history on file.     Family History:   Family History   Problem Relation Age of Onset   • Hyperlipidemia Mother    • No Known Problems Father    • Asthma Sister        Family History negative for : Breast, Colon, Lung, or female organ cancer, thyroid disease, CAD, Diabetes, osteoporosis.     OBJECTIVE:   /82   Pulse 80   Temp 36.8 °C (98.2 °F)   Resp 16   Ht 1.575 m (5' 2\")   Wt 72.2 kg (159 lb 3.2 oz)   SpO2 97%   BMI 29.12 kg/m²   Body mass index is 29.12 kg/m².    HEAD AND NECK:  Ears normal.  Throat, oral cavity and tongue normal.  Neck supple. No adenopathy or masses in the neck or supraclavicular regions.  No thyromegaly. NEURO: Cranial nerves are normal.   CHEST:  Clear, good air entry, no wheezes or rales. HEART:  Regular rate and rhythm.  S1 and S2 normal.  No edema or JVD. ABDOMEN:  Soft without tenderness, guarding, mass or organomegaly.  No CVA tenderness or inguinal adenopathy. EXTREMITIES:  Extremities, peripheral pulses are normal. SKIN: color normal, vascularity normal, no edema, temperature normal   No rashes or suspicious skin lesions noted.     Breast Exam: Performed with instruction during examination. No axillary lymphadenopathy, no skin changes, no dominant masses. No nipple retraction  Pelvic Exam -  Normal external genitalia with no lesions. Normal vaginal mucosa with normal rugation and scant " discharge. Cervix with no visible lesions. No cervical motion tenderness. Uterus is normal sized with no masses. No adnexal tenderness or enlargement appreciated. Sure Path Pap obtainedand specimen(s) sent to lab    <ASSESSMENT and PLAN>  1. Screening for cervical cancer  THINPREP PAP WITH HPV   2. Well woman exam with routine gynecological exam         Discussed:      Follow-up in 5 years for next Gyn exam and in 5 years for next Pap smear, pending all laboratory results are normal.     Next office visit for recheck of chronic medical conditions is due in one year.

## 2018-05-26 LAB — AMBIGUOUS DTTM AMBI4: NORMAL

## 2018-05-30 LAB
CYTOLOGY REG CYTOL: NORMAL
HPV HR 12 DNA CVX QL NAA+PROBE: NEGATIVE
HPV16 DNA SPEC QL NAA+PROBE: NEGATIVE
HPV18 DNA SPEC QL NAA+PROBE: NEGATIVE
SPECIMEN SOURCE: NORMAL

## 2018-07-25 ENCOUNTER — HOSPITAL ENCOUNTER (OUTPATIENT)
Dept: LAB | Facility: MEDICAL CENTER | Age: 53
End: 2018-07-25
Attending: NURSE PRACTITIONER
Payer: COMMERCIAL

## 2018-07-25 DIAGNOSIS — E03.9 HYPOTHYROIDISM, UNSPECIFIED TYPE: ICD-10-CM

## 2018-07-25 LAB — TSH SERPL DL<=0.005 MIU/L-ACNC: 0.14 UIU/ML (ref 0.38–5.33)

## 2018-07-25 PROCEDURE — 36415 COLL VENOUS BLD VENIPUNCTURE: CPT

## 2018-07-25 PROCEDURE — 84443 ASSAY THYROID STIM HORMONE: CPT

## 2018-07-26 ENCOUNTER — TELEPHONE (OUTPATIENT)
Dept: MEDICAL GROUP | Facility: PHYSICIAN GROUP | Age: 53
End: 2018-07-26

## 2018-07-26 DIAGNOSIS — E03.9 HYPOTHYROIDISM, UNSPECIFIED TYPE: ICD-10-CM

## 2018-07-26 DIAGNOSIS — E03.9 ACQUIRED HYPOTHYROIDISM: ICD-10-CM

## 2018-07-26 RX ORDER — LEVOTHYROXINE SODIUM 0.15 MG/1
TABLET ORAL
Qty: 90 TAB | Refills: 0 | Status: SHIPPED | OUTPATIENT
Start: 2018-07-26 | End: 2018-10-15 | Stop reason: SDUPTHER

## 2018-10-10 ENCOUNTER — HOSPITAL ENCOUNTER (OUTPATIENT)
Dept: LAB | Facility: MEDICAL CENTER | Age: 53
End: 2018-10-10
Attending: NURSE PRACTITIONER
Payer: COMMERCIAL

## 2018-10-10 DIAGNOSIS — E03.9 ACQUIRED HYPOTHYROIDISM: ICD-10-CM

## 2018-10-10 LAB — TSH SERPL DL<=0.005 MIU/L-ACNC: 0.17 UIU/ML (ref 0.38–5.33)

## 2018-10-10 PROCEDURE — 84443 ASSAY THYROID STIM HORMONE: CPT

## 2018-10-10 PROCEDURE — 36415 COLL VENOUS BLD VENIPUNCTURE: CPT

## 2018-10-11 ENCOUNTER — PATIENT MESSAGE (OUTPATIENT)
Dept: MEDICAL GROUP | Facility: PHYSICIAN GROUP | Age: 53
End: 2018-10-11

## 2018-10-11 DIAGNOSIS — E03.9 HYPOTHYROIDISM, UNSPECIFIED TYPE: ICD-10-CM

## 2018-10-11 DIAGNOSIS — J45.21 MILD INTERMITTENT ASTHMA WITH ACUTE EXACERBATION: ICD-10-CM

## 2018-10-15 RX ORDER — LEVOTHYROXINE SODIUM 0.1 MG/1
TABLET ORAL
Qty: 90 TAB | Refills: 0 | Status: SHIPPED | OUTPATIENT
Start: 2018-10-15 | End: 2018-12-14 | Stop reason: SDUPTHER

## 2018-10-15 RX ORDER — ALBUTEROL SULFATE 90 UG/1
1-2 AEROSOL, METERED RESPIRATORY (INHALATION) EVERY 4 HOURS PRN
Qty: 1 INHALER | Refills: 3 | Status: SHIPPED | OUTPATIENT
Start: 2018-10-15 | End: 2019-11-26 | Stop reason: SDUPTHER

## 2018-10-26 ENCOUNTER — PATIENT MESSAGE (OUTPATIENT)
Dept: MEDICAL GROUP | Facility: PHYSICIAN GROUP | Age: 53
End: 2018-10-26

## 2018-10-26 DIAGNOSIS — J30.2 SEASONAL ALLERGIES: ICD-10-CM

## 2018-10-29 RX ORDER — MONTELUKAST SODIUM 10 MG/1
10 TABLET ORAL DAILY
Qty: 90 TAB | Refills: 3 | Status: SHIPPED | OUTPATIENT
Start: 2018-10-29 | End: 2019-08-07

## 2018-12-03 ENCOUNTER — HOSPITAL ENCOUNTER (OUTPATIENT)
Dept: LAB | Facility: MEDICAL CENTER | Age: 53
End: 2018-12-03
Attending: NURSE PRACTITIONER
Payer: COMMERCIAL

## 2018-12-03 ENCOUNTER — OFFICE VISIT (OUTPATIENT)
Dept: URGENT CARE | Facility: PHYSICIAN GROUP | Age: 53
End: 2018-12-03
Payer: COMMERCIAL

## 2018-12-03 VITALS
SYSTOLIC BLOOD PRESSURE: 116 MMHG | TEMPERATURE: 97.2 F | RESPIRATION RATE: 16 BRPM | BODY MASS INDEX: 27.79 KG/M2 | OXYGEN SATURATION: 96 % | HEART RATE: 97 BPM | DIASTOLIC BLOOD PRESSURE: 74 MMHG | WEIGHT: 151 LBS | HEIGHT: 62 IN

## 2018-12-03 DIAGNOSIS — J45.41 MODERATE PERSISTENT ASTHMA WITH ACUTE EXACERBATION: ICD-10-CM

## 2018-12-03 DIAGNOSIS — J22 ACUTE LOWER RESPIRATORY INFECTION: ICD-10-CM

## 2018-12-03 DIAGNOSIS — J20.9 ACUTE BRONCHITIS, UNSPECIFIED ORGANISM: ICD-10-CM

## 2018-12-03 PROCEDURE — 99214 OFFICE O/P EST MOD 30 MIN: CPT | Performed by: FAMILY MEDICINE

## 2018-12-03 RX ORDER — AZITHROMYCIN 250 MG/1
TABLET, FILM COATED ORAL
Qty: 6 TAB | Refills: 0 | Status: SHIPPED | OUTPATIENT
Start: 2018-12-03 | End: 2019-02-05

## 2018-12-03 RX ORDER — PREDNISONE 20 MG/1
TABLET ORAL
Qty: 10 TAB | Refills: 0 | Status: SHIPPED | OUTPATIENT
Start: 2018-12-03 | End: 2019-02-05

## 2018-12-03 NOTE — PROGRESS NOTES
Chief Complaint:    Chief Complaint   Patient presents with   • Nasal Congestion     x 2 weeks    • Shortness of Breath       History of Present Illness:    This is a new problem. Symptoms x 2 weeks. Has nasal symptoms, cough productive of purulent mucus, shortness of breath, and wheezing. Overall symptoms are at least moderate severity and not getting better. Has Asthma and has Advair, Singulair, Albuterol MDI, and nebs to use. Z-jenelle and Prednisone 20 mg BID x 5 days worked/tolerated for similar symptoms 3/28/18.      Review of Systems:    Constitutional: Negative for fever, chills, and diaphoresis.   Eyes: Negative for change in vision, photophobia, pain, redness, and discharge.  ENT: See HPI.  Respiratory: See HPI.   Cardiovascular: Negative for chest pain, palpitations, orthopnea, claudication, leg swelling, and PND.   Gastrointestinal: Negative for abdominal pain, nausea, vomiting, diarrhea, constipation, blood in stool, and melena.   Genitourinary: Negative for dysuria, urinary urgency, urinary frequency, hematuria, and flank pain.   Musculoskeletal: Negative for myalgias, joint pain, neck pain, and back pain.   Skin: Negative for rash and itching.   Neurological: Negative for dizziness, tingling, tremors, sensory change, speech change, focal weakness, seizures, loss of consciousness, and headaches.   Endo: Hypothyroid, on medication.  Heme: Does not bruise/bleed easily.   Psychiatric/Behavioral: No new symptoms.      Past Medical History:    Past Medical History:   Diagnosis Date   • Anxiety    • Asthma    • Carotid artery narrowing    • Thyroid disease      Past Surgical History:    No past surgical history on file.    Social History:    Social History     Social History   • Marital status:      Spouse name: N/A   • Number of children: N/A   • Years of education: N/A     Occupational History   • Not on file.     Social History Main Topics   • Smoking status: Never Smoker   • Smokeless tobacco: Never  "Used   • Alcohol use 0.6 oz/week     1 Shots of liquor per week      Comment: every few days   • Drug use: No   • Sexual activity: Yes     Partners: Male     Other Topics Concern   • Not on file     Social History Narrative   • No narrative on file     Family History:    Family History   Problem Relation Age of Onset   • Hyperlipidemia Mother    • No Known Problems Father    • Asthma Sister      Medications:    Current Outpatient Prescriptions on File Prior to Visit   Medication Sig Dispense Refill   • montelukast (SINGULAIR) 10 MG Tab Take 1 Tab by mouth every day. 90 Tab 3   • levothyroxine (SYNTHROID) 100 MCG Tab Take one tab daily 1/2 hour prior to breakfast on an empty stomach. 90 Tab 0   • fluticasone-salmeterol (ADVAIR) 250-50 MCG/DOSE AEROSOL POWDER, BREATH ACTIVATED Inhale 1 Puff by mouth every 12 hours. 1 Inhaler 5   • sertraline (ZOLOFT) 50 MG Tab Take 1 Tab by mouth every day. 90 Tab 3   • albuterol 108 (90 Base) MCG/ACT Aero Soln inhalation aerosol Inhale 1-2 Puffs by mouth every four hours as needed for Shortness of Breath. 1 Inhaler 3   • ipratropium-albuterol (DUONEB) 0.5-2.5 (3) MG/3ML nebulizer solution 3 mL by Nebulization route 4 times a day. 30 Bullet 2     No current facility-administered medications on file prior to visit.      Allergies:    No Known Allergies      Vitals:    Vitals:    12/03/18 1300   BP: 116/74   BP Location: Left arm   Patient Position: Sitting   BP Cuff Size: Adult   Pulse: 97   Resp: 16   Temp: 36.2 °C (97.2 °F)   TempSrc: Temporal   SpO2: 96%   Weight: 68.5 kg (151 lb)   Height: 1.575 m (5' 2\")       Physical Exam:    Constitutional: Vital signs reviewed. Appears well-developed and well-nourished. No acute distress.   Eyes: Sclera white, conjunctivae clear.   ENT: External ears normal. External auditory canals normal without discharge. TMs translucent and non-bulging. Hearing normal. Nasal mucosa pink. Lips/teeth are normal. Oral mucosa pink and moist. Posterior pharynx: " WNL.  Neck: Neck supple.   Cardiovascular: Regular rate and rhythm. No murmur.  Pulmonary/Chest: Respirations non-labored. Clear to auscultation bilaterally.  Lymph: Cervical nodes without tenderness or enlargement.  Musculoskeletal: Normal gait. Normal range of motion. No muscular atrophy or weakness.  Neurological: Alert and oriented to person, place, and time. Muscle tone normal. Coordination normal.   Skin: No rashes or lesions. Warm, dry, normal turgor.  Psychiatric: Normal mood and affect. Behavior is normal. Judgment and thought content normal.       Assessment / Plan:    1. Moderate persistent asthma with acute exacerbation  - predniSONE (DELTASONE) 20 MG Tab; 1 TAB BY MOUTH TWICE A DAY X 5 DAYS. TAKE WITH FOOD.  Dispense: 10 Tab; Refill: 0    2. Acute lower respiratory infection  - azithromycin (ZITHROMAX) 250 MG Tab; 2 TABS BY MOUTH ON DAY 1, 1 TAB ON DAYS 2-5.  Dispense: 6 Tab; Refill: 0    3. Acute bronchitis, unspecified organism  - predniSONE (DELTASONE) 20 MG Tab; 1 TAB BY MOUTH TWICE A DAY X 5 DAYS. TAKE WITH FOOD.  Dispense: 10 Tab; Refill: 0      Discussed with her DDX, management options, and risks, benefits, and alternatives to treatment plan agreed upon.    Agreeable to medications prescribed.    Discussed expected course of duration, time for improvement, and to seek follow-up in Emergency Room, urgent care, or with PCP if getting worse at any time or not improving within expected time frame.

## 2018-12-13 ENCOUNTER — HOSPITAL ENCOUNTER (OUTPATIENT)
Dept: LAB | Facility: MEDICAL CENTER | Age: 53
End: 2018-12-13
Attending: NURSE PRACTITIONER
Payer: COMMERCIAL

## 2018-12-13 DIAGNOSIS — E03.9 HYPOTHYROIDISM, UNSPECIFIED TYPE: ICD-10-CM

## 2018-12-13 PROCEDURE — 84443 ASSAY THYROID STIM HORMONE: CPT

## 2018-12-13 PROCEDURE — 36415 COLL VENOUS BLD VENIPUNCTURE: CPT

## 2018-12-14 ENCOUNTER — OFFICE VISIT (OUTPATIENT)
Dept: MEDICAL GROUP | Facility: PHYSICIAN GROUP | Age: 53
End: 2018-12-14
Payer: COMMERCIAL

## 2018-12-14 VITALS
OXYGEN SATURATION: 97 % | TEMPERATURE: 98.2 F | BODY MASS INDEX: 27.6 KG/M2 | HEART RATE: 70 BPM | HEIGHT: 62 IN | DIASTOLIC BLOOD PRESSURE: 78 MMHG | WEIGHT: 150 LBS | SYSTOLIC BLOOD PRESSURE: 118 MMHG | RESPIRATION RATE: 12 BRPM

## 2018-12-14 DIAGNOSIS — E03.9 HYPOTHYROIDISM, UNSPECIFIED TYPE: ICD-10-CM

## 2018-12-14 DIAGNOSIS — K21.9 GASTROESOPHAGEAL REFLUX DISEASE WITHOUT ESOPHAGITIS: ICD-10-CM

## 2018-12-14 DIAGNOSIS — J45.41 MODERATE PERSISTENT ASTHMA WITH ACUTE EXACERBATION: ICD-10-CM

## 2018-12-14 LAB — TSH SERPL DL<=0.005 MIU/L-ACNC: 6.64 UIU/ML (ref 0.38–5.33)

## 2018-12-14 PROCEDURE — 99214 OFFICE O/P EST MOD 30 MIN: CPT | Performed by: NURSE PRACTITIONER

## 2018-12-14 RX ORDER — OMEPRAZOLE 20 MG/1
20 CAPSULE, DELAYED RELEASE ORAL DAILY
Qty: 14 CAP | Refills: 1 | Status: SHIPPED | OUTPATIENT
Start: 2018-12-14 | End: 2019-08-07

## 2018-12-14 RX ORDER — LEVOTHYROXINE SODIUM 0.1 MG/1
TABLET ORAL
Qty: 90 TAB | Refills: 0 | Status: SHIPPED | OUTPATIENT
Start: 2018-12-14 | End: 2019-03-19 | Stop reason: SDUPTHER

## 2018-12-14 ASSESSMENT — PAIN SCALES - GENERAL: PAINLEVEL: NO PAIN

## 2018-12-14 NOTE — PROGRESS NOTES
CC: Cough and lab review for hypothyroid    HISTORY OF THE PRESENT ILLNESS: Patient is a 53 y.o. female. This pleasant patient is here today for evaluation management the following health problems.      Acquired hypothyroidism  This is a chronic health problem that is uncontrolled with current medications and lifestyle measures.  Patient decreased levothyroxine to 100 mcg daily about 8 weeks ago for decreased TSH.  TSH is now mildly elevated at 6.640.  Dose of levothyroxine has been changed 3 times without getting TSH in therapeutic range.  Discussed with patient and she agrees to referral to endocrinology.  Denies skin or hair changes, brain fog, constipation.    Component      Latest Ref Rng & Units 7/25/2018 10/10/2018 12/13/2018          12:11 PM  7:00 AM 12:50 PM   TSH      0.380 - 5.330 uIU/mL 0.140 (L) 0.170 (L) 6.640 (H)       Moderate persistent asthma with acute exacerbation  This is a chronic health problem that is uncontrolled with current medications and lifestyle measures.  Patient was recently treated for asthma exacerbation and lower respiratory tract infection by urgent care.  She finished her prednisone burst and Z-John.  She reports she has much more energy, but cough and sinus congestion still remain.  She is now hoarse, which is new symptom.  She reports she is using Advair inhaler twice a day and will use albuterol inhaler twice a day.  She also uses nebulizer albuterol for moisture.  She reports cough is triggered by a tickle in her throat rather than wheezing.  I advised patient to use saline and her nebulizer instead of albuterol to see if this helps with a tickle in her throat.  Patient also reports tickle in her throat/cough will wake her at night.  She does admit to some heartburn.  She will trial of omeprazole for 2 weeks to see if this helps with cough.  We will also get chest x-ray and refer to pulmonology for further evaluation.  ER precautions reviewed with patient.      Allergies:  "Patient has no known allergies.    Current Outpatient Prescriptions Ordered in [facility]   Medication Sig Dispense Refill   • levothyroxine (SYNTHROID) 100 MCG Tab Take one tab daily 1/2 hour prior to breakfast on an empty stomach. 90 Tab 0   • omeprazole (PRILOSEC) 20 MG delayed-release capsule Take 1 Cap by mouth every day. 14 Cap 1   • montelukast (SINGULAIR) 10 MG Tab Take 1 Tab by mouth every day. 90 Tab 3   • sertraline (ZOLOFT) 50 MG Tab Take 1 Tab by mouth every day. 90 Tab 3   • azithromycin (ZITHROMAX) 250 MG Tab 2 TABS BY MOUTH ON DAY 1, 1 TAB ON DAYS 2-5. 6 Tab 0   • predniSONE (DELTASONE) 20 MG Tab 1 TAB BY MOUTH TWICE A DAY X 5 DAYS. TAKE WITH FOOD. 10 Tab 0   • albuterol 108 (90 Base) MCG/ACT Aero Soln inhalation aerosol Inhale 1-2 Puffs by mouth every four hours as needed for Shortness of Breath. 1 Inhaler 3   • fluticasone-salmeterol (ADVAIR) 250-50 MCG/DOSE AEROSOL POWDER, BREATH ACTIVATED Inhale 1 Puff by mouth every 12 hours. 1 Inhaler 5   • ipratropium-albuterol (DUONEB) 0.5-2.5 (3) MG/3ML nebulizer solution 3 mL by Nebulization route 4 times a day. 30 Bullet 2     No current Epic-ordered facility-administered medications on file.        Past Medical History:   Diagnosis Date   • Anxiety    • Asthma    • Carotid artery narrowing    • Thyroid disease        No past surgical history on file.    Social History   Substance Use Topics   • Smoking status: Never Smoker   • Smokeless tobacco: Never Used   • Alcohol use 0.6 oz/week     1 Shots of liquor per week      Comment: every few days       Family History   Problem Relation Age of Onset   • Hyperlipidemia Mother    • No Known Problems Father    • Asthma Sister        ROS:   As in HPI, otherwise negative for chest pain, abdominal pain, dyspnea, fever          Exam: Blood pressure 118/78, pulse 70, temperature 36.8 °C (98.2 °F), temperature source Temporal, resp. rate 12, height 1.575 m (5' 2\"), weight 68 kg (150 lb), last menstrual period 11/14/2018, " SpO2 97 %, not currently breastfeeding. Body mass index is 27.44 kg/m².    General: Alert, pleasant, well nourished, well developed female in NAD  HEENT: Normocephalic. Eyes conjunctiva clear lids without ptosis, pupils equal and reactive to light, ears normal shape and contour, canals are clear bilaterally, tympanic membranes pearly gray and mildly dull with good light reflex, nasal mucosa without erythema and drainage, oropharynx is without erythema, edema or exudates.   Neck: Supple without bruit. Thyroid is not enlarged.  Pulmonary: Clear to ausculation.  Normal effort. No rales, ronchi, or wheezing.  Dry cough and hoarse voice heard on exam.  Cardiovascular: Normal rate and rhythm without murmur. Carotid and radial pulses are intact and equal bilaterally.  No lower extremity edema.  Lymph: No cervical or supraclavicular lymph nodes are palpable  Skin: Warm and dry.  No obvious lesions.  Musculoskeletal: Normal gait.   Psych: Normal mood and affect. Alert and oriented. Judgment and insight is normal.    Please note that this dictation was created using voice recognition software. I have made every reasonable attempt to correct obvious errors, but I expect that there are errors of grammar and possibly content that I did not discover before finalizing the note.      Assessment/Plan  1. Hypothyroidism, unspecified type  Patient will continue on current dose of levothyroxine and will consult with endocrinology.  - REFERRAL TO ENDOCRINOLOGY  - levothyroxine (SYNTHROID) 100 MCG Tab; Take one tab daily 1/2 hour prior to breakfast on an empty stomach.  Dispense: 90 Tab; Refill: 0    2. Moderate persistent asthma with acute exacerbation  We will let patient know x-ray results.  She will trial omeprazole to see if GERD is instigating her cough.  She will also use saline in her nebulizer to see if this helps with tickle in her throat.  - DX-CHEST-2 VIEWS; Future  - omeprazole (PRILOSEC) 20 MG delayed-release capsule; Take  1 Cap by mouth every day.  Dispense: 14 Cap; Refill: 1  - REFERRAL TO PULMONOLOGY    3. Gastroesophageal reflux disease without esophagitis    - omeprazole (PRILOSEC) 20 MG delayed-release capsule; Take 1 Cap by mouth every day.  Dispense: 14 Cap; Refill: 1    Patient will return to clinic in 3 weeks for cough.  She will return to clinic sooner if needed.  ER precautions reviewed with patient.

## 2018-12-15 NOTE — ASSESSMENT & PLAN NOTE
This is a chronic health problem that is uncontrolled with current medications and lifestyle measures.  Patient was recently treated for asthma exacerbation and lower respiratory tract infection by urgent care.  She finished her prednisone burst and Z-John.  She reports she has much more energy, but cough and sinus congestion still remain.  She is now hoarse, which is new symptom.  She reports she is using Advair inhaler twice a day and will use albuterol inhaler twice a day.  She also uses nebulizer albuterol for moisture.  She reports cough is triggered by a tickle in her throat rather than wheezing.  I advised patient to use saline and her nebulizer instead of albuterol to see if this helps with a tickle in her throat.  Patient also reports tickle in her throat/cough will wake her at night.  She does admit to some heartburn.  She will trial of omeprazole for 2 weeks to see if this helps with cough.  We will also get chest x-ray and refer to pulmonology for further evaluation.  ER precautions reviewed with patient.

## 2018-12-15 NOTE — ASSESSMENT & PLAN NOTE
This is a chronic health problem that is uncontrolled with current medications and lifestyle measures.  Patient decreased levothyroxine to 100 mcg daily about 8 weeks ago for decreased TSH.  TSH is now mildly elevated at 6.640.  Dose of levothyroxine has been changed 3 times without getting TSH in therapeutic range.  Discussed with patient and she agrees to referral to endocrinology.  Denies skin or hair changes, brain fog, constipation.    Component      Latest Ref Rng & Units 7/25/2018 10/10/2018 12/13/2018          12:11 PM  7:00 AM 12:50 PM   TSH      0.380 - 5.330 uIU/mL 0.140 (L) 0.170 (L) 6.640 (H)

## 2018-12-18 ENCOUNTER — APPOINTMENT (OUTPATIENT)
Dept: RADIOLOGY | Facility: IMAGING CENTER | Age: 53
End: 2018-12-18
Attending: NURSE PRACTITIONER
Payer: COMMERCIAL

## 2018-12-18 DIAGNOSIS — J45.41 MODERATE PERSISTENT ASTHMA WITH ACUTE EXACERBATION: ICD-10-CM

## 2018-12-18 PROCEDURE — 71046 X-RAY EXAM CHEST 2 VIEWS: CPT | Mod: 26 | Performed by: NURSE PRACTITIONER

## 2019-02-05 ENCOUNTER — OFFICE VISIT (OUTPATIENT)
Dept: MEDICAL GROUP | Facility: PHYSICIAN GROUP | Age: 54
End: 2019-02-05
Payer: COMMERCIAL

## 2019-02-05 ENCOUNTER — HOSPITAL ENCOUNTER (OUTPATIENT)
Facility: MEDICAL CENTER | Age: 54
End: 2019-02-05
Attending: NURSE PRACTITIONER
Payer: COMMERCIAL

## 2019-02-05 VITALS
OXYGEN SATURATION: 95 % | HEIGHT: 62 IN | SYSTOLIC BLOOD PRESSURE: 120 MMHG | RESPIRATION RATE: 16 BRPM | BODY MASS INDEX: 27.6 KG/M2 | HEART RATE: 91 BPM | TEMPERATURE: 98.2 F | DIASTOLIC BLOOD PRESSURE: 86 MMHG | WEIGHT: 150 LBS

## 2019-02-05 DIAGNOSIS — E78.5 DYSLIPIDEMIA: ICD-10-CM

## 2019-02-05 DIAGNOSIS — R31.9 HEMATURIA, UNSPECIFIED TYPE: ICD-10-CM

## 2019-02-05 DIAGNOSIS — J45.41 MODERATE PERSISTENT ASTHMA WITH ACUTE EXACERBATION: ICD-10-CM

## 2019-02-05 DIAGNOSIS — E03.9 ACQUIRED HYPOTHYROIDISM: ICD-10-CM

## 2019-02-05 DIAGNOSIS — E55.9 VITAMIN D INSUFFICIENCY: ICD-10-CM

## 2019-02-05 DIAGNOSIS — Z13.6 SCREENING FOR CARDIOVASCULAR CONDITION: ICD-10-CM

## 2019-02-05 LAB
APPEARANCE UR: NORMAL
BILIRUB UR STRIP-MCNC: NEGATIVE MG/DL
COLOR UR AUTO: NORMAL
GLUCOSE UR STRIP.AUTO-MCNC: NEGATIVE MG/DL
KETONES UR STRIP.AUTO-MCNC: NEGATIVE MG/DL
LEUKOCYTE ESTERASE UR QL STRIP.AUTO: NEGATIVE
NITRITE UR QL STRIP.AUTO: NEGATIVE
PH UR STRIP.AUTO: 5.5 [PH] (ref 5–8)
PROT UR QL STRIP: NEGATIVE MG/DL
RBC UR QL AUTO: NORMAL
SP GR UR STRIP.AUTO: 1.02
UROBILINOGEN UR STRIP-MCNC: 0.2 MG/DL

## 2019-02-05 PROCEDURE — 81002 URINALYSIS NONAUTO W/O SCOPE: CPT | Performed by: NURSE PRACTITIONER

## 2019-02-05 PROCEDURE — 87086 URINE CULTURE/COLONY COUNT: CPT

## 2019-02-05 PROCEDURE — 99214 OFFICE O/P EST MOD 30 MIN: CPT | Performed by: NURSE PRACTITIONER

## 2019-02-05 ASSESSMENT — PATIENT HEALTH QUESTIONNAIRE - PHQ9
CLINICAL INTERPRETATION OF PHQ2 SCORE: 2
SUM OF ALL RESPONSES TO PHQ QUESTIONS 1-9: 4
5. POOR APPETITE OR OVEREATING: 0 - NOT AT ALL

## 2019-02-05 NOTE — ASSESSMENT & PLAN NOTE
Patient reports onset hematuria 7 days ago.  Reports it has been waxing and waning in amount of blood.  Reports some clots.  Associative symptoms include abdominal bloating.  Denies flank pain, fever, nausea, diarrhea.  Patient is certain it is not her menses.  Point-of-care urinalysis positive for trace blood, otherwise negative.  Will send for culture.  Will get CT of abdomen and pelvis, refer to urology.

## 2019-02-05 NOTE — PATIENT INSTRUCTIONS
Call [phone], ask for referrals, to schedule consultations with pulmonology and endocrinology    Have fasting labs done prior to next appointment in 3 months

## 2019-02-05 NOTE — PROGRESS NOTES
CC: Hematuria    HISTORY OF THE PRESENT ILLNESS: Patient is a 53 y.o. female. This pleasant patient is here today for evaluation management of the following health problems.    Hematuria  Patient reports onset hematuria 7 days ago.  Reports it has been waxing and waning in amount of blood.  Reports some clots.  Associative symptoms include abdominal bloating.  Patient is certain it is not her menses.  Point-of-care urinalysis positive for trace blood, otherwise negative.  Will send for culture.  Will get CT of abdomen and pelvis, refer to urology.      Allergies: Patient has no known allergies.    Current Outpatient Prescriptions Ordered in [facility]   Medication Sig Dispense Refill   • fluticasone-salmeterol (ADVAIR) 250-50 MCG/DOSE AEROSOL POWDER, BREATH ACTIVATED Inhale 1 Puff by mouth every 12 hours. 1 Inhaler 0   • levothyroxine (SYNTHROID) 100 MCG Tab Take one tab daily 1/2 hour prior to breakfast on an empty stomach. 90 Tab 0   • omeprazole (PRILOSEC) 20 MG delayed-release capsule Take 1 Cap by mouth every day. 14 Cap 1   • montelukast (SINGULAIR) 10 MG Tab Take 1 Tab by mouth every day. 90 Tab 3   • albuterol 108 (90 Base) MCG/ACT Aero Soln inhalation aerosol Inhale 1-2 Puffs by mouth every four hours as needed for Shortness of Breath. 1 Inhaler 3   • sertraline (ZOLOFT) 50 MG Tab Take 1 Tab by mouth every day. 90 Tab 3   • ipratropium-albuterol (DUONEB) 0.5-2.5 (3) MG/3ML nebulizer solution 3 mL by Nebulization route 4 times a day. 30 Bullet 2     No current Epic-ordered facility-administered medications on file.        Past Medical History:   Diagnosis Date   • Anxiety    • Asthma    • Carotid artery narrowing    • Thyroid disease        No past surgical history on file.    Social History   Substance Use Topics   • Smoking status: Never Smoker   • Smokeless tobacco: Never Used   • Alcohol use 0.6 oz/week     1 Shots of liquor per week      Comment: every few days       Family History   Problem Relation Age of  "Onset   • Hyperlipidemia Mother    • No Known Problems Father    • Asthma Sister        ROS:   As in HPI, otherwise negative for chest pain, dyspnea, abdominal pain           Exam: Blood pressure 120/86, pulse 91, temperature 36.8 °C (98.2 °F), temperature source Temporal, resp. rate 16, height 1.575 m (5' 2\"), weight 68 kg (150 lb), last menstrual period 12/21/2018, SpO2 95 %, not currently breastfeeding. Body mass index is 27.44 kg/m².    General: Alert, pleasant, well nourished, well developed female in NAD  Pulmonary: Clear to ausculation.  Normal effort. No rales, ronchi, or wheezing.  Cardiovascular: Normal rate and rhythm without murmur. Carotid and radial pulses are intact and equal bilaterally.  No lower extremity edema.  Abdomen: Soft, mild suprapubic tenderness, nondistended. Normal bowel sounds. Liver and spleen are not palpable  Back: Negative CVA tenderness  Neurologic: Grossly nonfocal  Lymph: No cervical or supraclavicular lymph nodes are palpable  Skin: Warm and dry.  No obvious lesions.  Musculoskeletal: Normal gait.   Psych: Normal mood and affect. Alert and oriented. Judgment and insight is normal.    Component      Latest Ref Rng & Units 2/5/2019           2:27 PM   POC Color      Negative Light Yellow   POC Appearance      Negative Cloudy   POC Leukocyte Esterase      Negative Negative   POC Nitrites      Negative Negative   POC Urobiligen      Negative (0.2) mg/dL 0.2   POC Protein      Negative mg/dL Negative   POC Urine PH      5.0 - 8.0 5.5   POC Blood      Negative Small   POC Specific Gravity      <1.005 - >1.030 1.025   POC Ketones      Negative mg/dL Negative   POC Bilirubin      Negative mg/dL Negative   POC Glucose      Negative mg/dL Negative       Please note that this dictation was created using voice recognition software. I have made every reasonable attempt to correct obvious errors, but I expect that there are errors of grammar and possibly content that I did not discover before " finalizing the note.      Assessment/Plan  1. Hematuria, unspecified type  We will send urine to urine for culture.  Will get CT abdomen/pelvis and refer to urology.  If culture positive for bacteria, patient will wait to see if hematuria resolves with antibiotic prior to scheduling CT or referral to urology.  - POCT Urinalysis  - URINE CULTURE(NEW); Future  - CT-ABDOMEN-PELVIS WITH & W/O; Future  - REFERRAL TO UROLOGY    2. Moderate persistent asthma with acute exacerbation  Patient requesting refill.  Reports now has to meet her deductible again and Advair will cost over $300.  I have sent prescription to healthcare pharmacy in [place].  Patient will call prior to picking up medication.  - fluticasone-salmeterol (ADVAIR) 250-50 MCG/DOSE AEROSOL POWDER, BREATH ACTIVATED; Inhale 1 Puff by mouth every 12 hours.  Dispense: 1 Inhaler; Refill: 0    3. Dyslipidemia  We will review lab work at next appointment.  - Lipid Profile; Future    4. Screening for cardiovascular condition  We will review lab results at next appointment.  - COMP METABOLIC PANEL; Future  - ESTIMATED GFR; Future    5. Vitamin D insufficiency  Patient has history of vitamin D deficiency.  Will review labs at next appointment.  - VITAMIN D,25 HYDROXY; Future    6. Acquired hypothyroidism  Patient will call to schedule with endocrinology, referred at last appointment.  - TSH; Future    Patient will return in clinic in 3 months for lab review or earlier if needed.

## 2019-02-07 LAB
BACTERIA UR CULT: NORMAL
SIGNIFICANT IND 70042: NORMAL
SITE SITE: NORMAL
SOURCE SOURCE: NORMAL

## 2019-02-28 ENCOUNTER — PATIENT MESSAGE (OUTPATIENT)
Dept: MEDICAL GROUP | Facility: PHYSICIAN GROUP | Age: 54
End: 2019-02-28

## 2019-02-28 NOTE — PATIENT COMMUNICATION
1. Caller Name: [facility]                                          Call Back Number: [phone]      Patient approves a detailed voicemail message: no    Amanda would like to know if patients order CT-ABDOMEN-PELVIS WITH & W/O is correct. Amanda would like to know if PCP meant to order renal colic instead. Amanda would like to know what PCP is specifically looking for. Please contact her at the number above so she can discuss this with you further. Thank you.

## 2019-03-01 ENCOUNTER — TELEPHONE (OUTPATIENT)
Dept: MEDICAL GROUP | Facility: PHYSICIAN GROUP | Age: 54
End: 2019-03-01

## 2019-03-04 NOTE — TELEPHONE ENCOUNTER
FINAL PRIOR AUTHORIZATION STATUS:    1.  Name of Medication & Dose: fluticasone-salmeterol        2. Prior Auth Status: Denied.  Reason: Clinical information does not meet the Inhaled Corticosteroids Coverage Criteria. DX J45.41 moderate persistent asthma    3. Action Taken: Pharmacy Notified: yes Patient Notified: no

## 2019-03-05 ENCOUNTER — HOSPITAL ENCOUNTER (OUTPATIENT)
Dept: RADIOLOGY | Facility: MEDICAL CENTER | Age: 54
End: 2019-03-05
Attending: NURSE PRACTITIONER
Payer: COMMERCIAL

## 2019-03-05 ENCOUNTER — TELEPHONE (OUTPATIENT)
Dept: MEDICAL GROUP | Facility: PHYSICIAN GROUP | Age: 54
End: 2019-03-05

## 2019-03-05 DIAGNOSIS — R31.9 HEMATURIA, UNSPECIFIED TYPE: ICD-10-CM

## 2019-03-05 PROBLEM — J45.40 MODERATE PERSISTENT ASTHMA WITHOUT COMPLICATION: Status: ACTIVE | Noted: 2018-02-10

## 2019-03-05 PROCEDURE — 74178 CT ABD&PLV WO CNTR FLWD CNTR: CPT

## 2019-03-05 PROCEDURE — 700117 HCHG RX CONTRAST REV CODE 255: Performed by: NURSE PRACTITIONER

## 2019-03-05 RX ADMIN — IOHEXOL 100 ML: 350 INJECTION, SOLUTION INTRAVENOUS at 15:11

## 2019-03-05 NOTE — TELEPHONE ENCOUNTER
I submitted the PA with dx moderate persistent asthma with acute exacerbation. I will resubmit with the new dx code

## 2019-03-05 NOTE — TELEPHONE ENCOUNTER
"Patient messaged me and said that the generic brand of Advair keeps getting denied because diagnosis does not meet criteria.  Will you review prior authorization application that we sent for GENERIC ADVAIR (fluticasone-salmeterol) to see what diagnosis the medication was under?  I have now changed diagnosis from \"moderate persistent asthma with acute exacerbation\" to \"moderate persistent asthma WITHOUT complication.\"    "

## 2019-03-05 NOTE — TELEPHONE ENCOUNTER
MEDICATION PRIOR AUTHORIZATION NEEDED:    1. Name of Medication: Generic advair    2. Requested By (Name of Pharmacy):     3. Is insurance on file current? yes    4. What is the name & phone number of the 3rd party payor? [facility]    Key [id number]

## 2019-03-07 ENCOUNTER — TELEPHONE (OUTPATIENT)
Dept: MEDICAL GROUP | Facility: PHYSICIAN GROUP | Age: 54
End: 2019-03-07

## 2019-03-07 DIAGNOSIS — J45.40 MODERATE PERSISTENT ASTHMA WITHOUT COMPLICATION: ICD-10-CM

## 2019-03-07 NOTE — TELEPHONE ENCOUNTER
FINAL PRIOR AUTHORIZATION STATUS:    1.  Name of Medication & Dose: fluticasone-salmeterol (ADVAIR) 250-50 MCG/DOSE AEROSOL POWDER, BREATH ACTIVATED         2. Prior Auth Status: DENIED. Encounter scanned into .    3. Action Taken: Pharmacy Notified: yes Patient Notified: na      Phone Number Called: [phone] (home)       Message: lvm for pt to call [phone] need to advise that PA for generic  fluticasone-salmeterol (ADVAIR) 250-50 MCG/DOSE AEROSOL POWDER, BREATH ACTIVATED was denied.        Left Message for patient to call back: yes

## 2019-03-07 NOTE — TELEPHONE ENCOUNTER
Reviewed denial in media tab.  Looks like they may cover HFA.  I have written new order and faxed to [facility].

## 2019-03-19 DIAGNOSIS — E03.9 HYPOTHYROIDISM, UNSPECIFIED TYPE: ICD-10-CM

## 2019-03-19 NOTE — TELEPHONE ENCOUNTER
Was the patient seen in the last year in this department? Yes    Does patient have an active prescription for medications requested? No     Received Request Via: Pharmacy      Pt met protocol?: Yes    OV 2/19  TSH  12/18

## 2019-03-20 RX ORDER — LEVOTHYROXINE SODIUM 0.1 MG/1
TABLET ORAL
Qty: 90 TAB | Refills: 0 | Status: SHIPPED | OUTPATIENT
Start: 2019-03-20 | End: 2019-06-20 | Stop reason: SDUPTHER

## 2019-03-20 NOTE — TELEPHONE ENCOUNTER
MA's- Please let pt know that she has a referral for endocrinology (from 12/18). and is ready to schedule. She also needs to get labs done to continue this med as last 2 labs have been out of range.

## 2019-04-08 ENCOUNTER — TELEMEDICINE2 (OUTPATIENT)
Dept: PULMONOLOGY | Facility: HOSPICE | Age: 54
End: 2019-04-08
Payer: COMMERCIAL

## 2019-04-08 VITALS
OXYGEN SATURATION: 97 % | RESPIRATION RATE: 20 BRPM | HEART RATE: 80 BPM | TEMPERATURE: 97.2 F | DIASTOLIC BLOOD PRESSURE: 70 MMHG | BODY MASS INDEX: 27.79 KG/M2 | WEIGHT: 151 LBS | SYSTOLIC BLOOD PRESSURE: 108 MMHG | HEIGHT: 62 IN

## 2019-04-08 DIAGNOSIS — R06.02 SOB (SHORTNESS OF BREATH): ICD-10-CM

## 2019-04-08 DIAGNOSIS — J45.40 MODERATE PERSISTENT ASTHMA WITHOUT COMPLICATION: ICD-10-CM

## 2019-04-08 PROCEDURE — 99204 OFFICE O/P NEW MOD 45 MIN: CPT | Performed by: INTERNAL MEDICINE

## 2019-04-08 RX ORDER — BUDESONIDE AND FORMOTEROL FUMARATE DIHYDRATE 160; 4.5 UG/1; UG/1
2 AEROSOL RESPIRATORY (INHALATION) 2 TIMES DAILY
Qty: 1 INHALER | Refills: 11 | Status: SHIPPED | OUTPATIENT
Start: 2019-04-08 | End: 2020-04-20

## 2019-04-08 ASSESSMENT — PAIN SCALES - GENERAL: PAINLEVEL: NO PAIN

## 2019-04-08 NOTE — LETTER
Balwinder Ying M.D.  [facility]   [address] [place] [id number]  Phone: [phone] - Fax: [phone]           Encounter Date: 4/8/2019  Provider: Balwinder Ying M.D.  Location of Care: [facility]      Patient:   Kev Garcia   MR Number: [id number]   YOB: 1965     PROGRESS NOTE:  Chief Complaint   Patient presents with   • New Patient     Asthma       HPI:  The patient is a 53-year-old woman who has a long history of asthma.  She is a never smoker.  The patient has allergies and asthma which previously were under reasonable control.  She moved out of this area to [place] for a year or so.  Upon her return her asthma and allergies were much worse.  She has frequent episodes of coughing and bronchitis.  When she uses Advair on a regular basis her symptoms are controlled.  Unfortunately, under her current insurance plan she tells me that her Advair costs $365 per month.  Her insurance will not cover this until she meets her deductible of $2500.  She also tried a prescription for generic Advair which costs about $150 per month.  When she stopped using her Advair she has frequent visits to her primary care provider and to urgent care.  She knows she is allergic to some animal danders.  She has never seen an allergist.  Singulair does not help.  She has gotten a Kenalog shot in the past.  However, the relief only lasts for a short period of time.  She is well controlled when she uses a steroid inhaler.    Past Medical History:   Diagnosis Date   • Anxiety    • Asthma    • Carotid artery narrowing    • Thyroid disease        ROS:   Constitutional: Denies fevers, chills, night sweats, fatigue or weight loss  Eyes: Denies vision loss, pain, drainage, double vision  Ears, Nose, Throat: Denies earache, tinnitus, hoarseness  Cardiovascular: Denies chest pain, tightness, palpitations  Respiratory: See HPI  Sleep: Denies,  snoring, apnea  GI: Denies abdominal pain, nausea, vomiting, diarrhea  : Denies frequent urination, hematuria, painful urination  Musculoskeletal: Denies back pain, painful joints, sore muscles  Neurological: Denies headaches, seizures  Skin: Denies rashes, color changes  Psychiatric: Denies depression or thoughts of suicide  Hematologic: Denies bleeding tendency or clotting tendency  Allergic/Immunologic: Denies rhinitis, skin sensitivity    Social History     Social History   • Marital status:      Spouse name: N/A   • Number of children: N/A   • Years of education: N/A     Occupational History   • Not on file.     Social History Main Topics   • Smoking status: Never Smoker   • Smokeless tobacco: Never Used   • Alcohol use 0.6 oz/week     1 Shots of liquor per week      Comment: every few days   • Drug use: No   • Sexual activity: Yes     Partners: Male     Other Topics Concern   • Not on file     Social History Narrative   • No narrative on file     Patient has no known allergies.  Current Outpatient Prescriptions on File Prior to Visit   Medication Sig Dispense Refill   • levothyroxine (SYNTHROID) 100 MCG Tab Take one tab daily 1/2 hour prior to breakfast on an empty stomach. 90 Tab 0   • omeprazole (PRILOSEC) 20 MG delayed-release capsule Take 1 Cap by mouth every day. 14 Cap 1   • albuterol 108 (90 Base) MCG/ACT Aero Soln inhalation aerosol Inhale 1-2 Puffs by mouth every four hours as needed for Shortness of Breath. 1 Inhaler 3   • sertraline (ZOLOFT) 50 MG Tab Take 1 Tab by mouth every day. 90 Tab 3   • ipratropium-albuterol (DUONEB) 0.5-2.5 (3) MG/3ML nebulizer solution 3 mL by Nebulization route 4 times a day. 30 Bullet 2   • NON SPECIFIED Fluticasone-Salmetrol 250-50 mcg/dose HFA, 1 puff every 12 hours. 1 Each 1   • fluticasone-salmeterol (ADVAIR) 250-50 MCG/DOSE AEROSOL POWDER, BREATH ACTIVATED Inhale 1 Puff by mouth every 12 hours. (Patient not taking: Reported on 4/8/2019) 1 Inhaler 0   •  "montelukast (SINGULAIR) 10 MG Tab Take 1 Tab by mouth every day. (Patient not taking: Reported on 4/8/2019) 90 Tab 3     No current facility-administered medications on file prior to visit.      /70   Pulse 80   Temp 36.2 °C (97.2 °F) (Oral)   Resp 20   Ht 1.575 m (5' 2\")   Wt 68.5 kg (151 lb)   SpO2 97%   Family History   Problem Relation Age of Onset   • Hyperlipidemia Mother    • No Known Problems Father    • Asthma Sister        Physical Exam:  No distress at rest on room air  HEENT: PERRLA, EOMI, no scleral icterus, no nasal or oral lesions  Neck: No thyromegaly, no adenopathy, no bruits  Mallampatti: Grade II  Lungs: Equal breath sounds, no wheezes or crackles  Heart: Regular rate and rhythm, no gallops or murmurs  Abdomen: Soft, benign, no organomegaly  Extremities: No clubbing, cyanosis, or edema  Neurologic: Cranial nerve, motor, and sensory exam are normal    1. Moderate persistent asthma without complication    2. SOB (shortness of breath)        This woman has chronic uncontrolled asthma that is allergic based.  Her symptoms historically have been well controlled with an inhaled steroid.  However, her current insurance plan does not cover that medication adequately.  We will give her a prescription for Symbicort to see what coverage she has there.  We will also obtain pulmonary function studies and refer her for an allergy evaluation.  We will see her back via telemedicine in a few months to see if she has had any success with Symbicort.      Electronically signed by Balwinder Ying M.D.  on 04/08/19    No Recipients                  "

## 2019-04-08 NOTE — PROGRESS NOTES
Chief Complaint   Patient presents with   • New Patient     Asthma       HPI:  The patient is a 53-year-old woman who has a long history of asthma.  She is a never smoker.  The patient has allergies and asthma which previously were under reasonable control.  She moved out of this area to [place] for a year or so.  Upon her return her asthma and allergies were much worse.  She has frequent episodes of coughing and bronchitis.  When she uses Advair on a regular basis her symptoms are controlled.  Unfortunately, under her current insurance plan she tells me that her Advair costs $365 per month.  Her insurance will not cover this until she meets her deductible of $2500.  She also tried a prescription for generic Advair which costs about $150 per month.  When she stopped using her Advair she has frequent visits to her primary care provider and to urgent care.  She knows she is allergic to some animal danders.  She has never seen an allergist.  Singulair does not help.  She has gotten a Kenalog shot in the past.  However, the relief only lasts for a short period of time.  She is well controlled when she uses a steroid inhaler.    Past Medical History:   Diagnosis Date   • Anxiety    • Asthma    • Carotid artery narrowing    • Thyroid disease        ROS:   Constitutional: Denies fevers, chills, night sweats, fatigue or weight loss  Eyes: Denies vision loss, pain, drainage, double vision  Ears, Nose, Throat: Denies earache, tinnitus, hoarseness  Cardiovascular: Denies chest pain, tightness, palpitations  Respiratory: See HPI  Sleep: Denies, snoring, apnea  GI: Denies abdominal pain, nausea, vomiting, diarrhea  : Denies frequent urination, hematuria, painful urination  Musculoskeletal: Denies back pain, painful joints, sore muscles  Neurological: Denies headaches, seizures  Skin: Denies rashes, color changes  Psychiatric: Denies depression or thoughts of suicide  Hematologic: Denies bleeding tendency or clotting  "tendency  Allergic/Immunologic: Denies rhinitis, skin sensitivity    Social History     Social History   • Marital status:      Spouse name: N/A   • Number of children: N/A   • Years of education: N/A     Occupational History   • Not on file.     Social History Main Topics   • Smoking status: Never Smoker   • Smokeless tobacco: Never Used   • Alcohol use 0.6 oz/week     1 Shots of liquor per week      Comment: every few days   • Drug use: No   • Sexual activity: Yes     Partners: Male     Other Topics Concern   • Not on file     Social History Narrative   • No narrative on file     Patient has no known allergies.  Current Outpatient Prescriptions on File Prior to Visit   Medication Sig Dispense Refill   • levothyroxine (SYNTHROID) 100 MCG Tab Take one tab daily 1/2 hour prior to breakfast on an empty stomach. 90 Tab 0   • omeprazole (PRILOSEC) 20 MG delayed-release capsule Take 1 Cap by mouth every day. 14 Cap 1   • albuterol 108 (90 Base) MCG/ACT Aero Soln inhalation aerosol Inhale 1-2 Puffs by mouth every four hours as needed for Shortness of Breath. 1 Inhaler 3   • sertraline (ZOLOFT) 50 MG Tab Take 1 Tab by mouth every day. 90 Tab 3   • ipratropium-albuterol (DUONEB) 0.5-2.5 (3) MG/3ML nebulizer solution 3 mL by Nebulization route 4 times a day. 30 Bullet 2   • NON SPECIFIED Fluticasone-Salmetrol 250-50 mcg/dose HFA, 1 puff every 12 hours. 1 Each 1   • fluticasone-salmeterol (ADVAIR) 250-50 MCG/DOSE AEROSOL POWDER, BREATH ACTIVATED Inhale 1 Puff by mouth every 12 hours. (Patient not taking: Reported on 4/8/2019) 1 Inhaler 0   • montelukast (SINGULAIR) 10 MG Tab Take 1 Tab by mouth every day. (Patient not taking: Reported on 4/8/2019) 90 Tab 3     No current facility-administered medications on file prior to visit.      /70   Pulse 80   Temp 36.2 °C (97.2 °F) (Oral)   Resp 20   Ht 1.575 m (5' 2\")   Wt 68.5 kg (151 lb)   SpO2 97%   Family History   Problem Relation Age of Onset   • " Hyperlipidemia Mother    • No Known Problems Father    • Asthma Sister        Physical Exam:  No distress at rest on room air  HEENT: PERRLA, EOMI, no scleral icterus, no nasal or oral lesions  Neck: No thyromegaly, no adenopathy, no bruits  Mallampatti: Grade II  Lungs: Equal breath sounds, no wheezes or crackles  Heart: Regular rate and rhythm, no gallops or murmurs  Abdomen: Soft, benign, no organomegaly  Extremities: No clubbing, cyanosis, or edema  Neurologic: Cranial nerve, motor, and sensory exam are normal    1. Moderate persistent asthma without complication    2. SOB (shortness of breath)        This woman has chronic uncontrolled asthma that is allergic based.  Her symptoms historically have been well controlled with an inhaled steroid.  However, her current insurance plan does not cover that medication adequately.  We will give her a prescription for Symbicort to see what coverage she has there.  We will also obtain pulmonary function studies and refer her for an allergy evaluation.  We will see her back via telemedicine in a few months to see if she has had any success with Symbicort.

## 2019-04-23 DIAGNOSIS — F41.9 ANXIETY: ICD-10-CM

## 2019-04-23 NOTE — TELEPHONE ENCOUNTER
Has upcoming appt w/ PCP. Will send 3 months of fills to pharmacy.  
Was the patient seen in the last year in this department? Yes    Does patient have an active prescription for medications requested? No     Received Request Via: Pharmacy    Pt met protocol?: Yes     Last OV 02/05/2019        
detailed exam

## 2019-06-20 DIAGNOSIS — E03.9 HYPOTHYROIDISM, UNSPECIFIED TYPE: ICD-10-CM

## 2019-06-20 RX ORDER — LEVOTHYROXINE SODIUM 0.1 MG/1
TABLET ORAL
Qty: 90 TAB | Refills: 0 | Status: SHIPPED | OUTPATIENT
Start: 2019-06-20 | End: 2019-08-07 | Stop reason: SDUPTHER

## 2019-06-20 NOTE — TELEPHONE ENCOUNTER
Was the patient seen in the last year in this department? Yes    Does patient have an active prescription for medications requested? No     Received Request Via: Pharmacy    Last OV 2/5/19, last labs 12/13/18

## 2019-06-20 NOTE — TELEPHONE ENCOUNTER
Refill sent to pharmacy.  Please ask patient to have fasting labs done and to make appointment with me or different provider in [place].

## 2019-07-17 ENCOUNTER — HOSPITAL ENCOUNTER (OUTPATIENT)
Dept: LAB | Facility: MEDICAL CENTER | Age: 54
End: 2019-07-17
Attending: NURSE PRACTITIONER
Payer: COMMERCIAL

## 2019-07-17 DIAGNOSIS — Z13.6 SCREENING FOR CARDIOVASCULAR CONDITION: ICD-10-CM

## 2019-07-17 DIAGNOSIS — E03.9 ACQUIRED HYPOTHYROIDISM: ICD-10-CM

## 2019-07-17 DIAGNOSIS — E78.5 DYSLIPIDEMIA: ICD-10-CM

## 2019-07-17 DIAGNOSIS — E55.9 VITAMIN D INSUFFICIENCY: ICD-10-CM

## 2019-07-17 LAB
25(OH)D3 SERPL-MCNC: 18 NG/ML (ref 30–100)
ALBUMIN SERPL BCP-MCNC: 4.4 G/DL (ref 3.2–4.9)
ALBUMIN/GLOB SERPL: 1.3 G/DL
ALP SERPL-CCNC: 83 U/L (ref 30–99)
ALT SERPL-CCNC: 87 U/L (ref 2–50)
ANION GAP SERPL CALC-SCNC: 13 MMOL/L (ref 0–11.9)
AST SERPL-CCNC: 90 U/L (ref 12–45)
BILIRUB SERPL-MCNC: 0.5 MG/DL (ref 0.1–1.5)
BUN SERPL-MCNC: 16 MG/DL (ref 8–22)
CALCIUM SERPL-MCNC: 9.2 MG/DL (ref 8.5–10.5)
CHLORIDE SERPL-SCNC: 103 MMOL/L (ref 96–112)
CHOLEST SERPL-MCNC: 251 MG/DL (ref 100–199)
CO2 SERPL-SCNC: 23 MMOL/L (ref 20–33)
CREAT SERPL-MCNC: 0.54 MG/DL (ref 0.5–1.4)
FASTING STATUS PATIENT QL REPORTED: NORMAL
GLOBULIN SER CALC-MCNC: 3.5 G/DL (ref 1.9–3.5)
GLUCOSE SERPL-MCNC: 87 MG/DL (ref 65–99)
HDLC SERPL-MCNC: 69 MG/DL
LDLC SERPL CALC-MCNC: 140 MG/DL
POTASSIUM SERPL-SCNC: 4 MMOL/L (ref 3.6–5.5)
PROT SERPL-MCNC: 7.9 G/DL (ref 6–8.2)
SODIUM SERPL-SCNC: 139 MMOL/L (ref 135–145)
TRIGL SERPL-MCNC: 211 MG/DL (ref 0–149)
TSH SERPL DL<=0.005 MIU/L-ACNC: 3.83 UIU/ML (ref 0.38–5.33)

## 2019-07-17 PROCEDURE — 82306 VITAMIN D 25 HYDROXY: CPT

## 2019-07-17 PROCEDURE — 80053 COMPREHEN METABOLIC PANEL: CPT

## 2019-07-17 PROCEDURE — 80061 LIPID PANEL: CPT

## 2019-07-17 PROCEDURE — 84443 ASSAY THYROID STIM HORMONE: CPT

## 2019-07-17 PROCEDURE — 36415 COLL VENOUS BLD VENIPUNCTURE: CPT

## 2019-08-07 ENCOUNTER — OFFICE VISIT (OUTPATIENT)
Dept: MEDICAL GROUP | Facility: PHYSICIAN GROUP | Age: 54
End: 2019-08-07
Payer: COMMERCIAL

## 2019-08-07 VITALS
TEMPERATURE: 98.3 F | OXYGEN SATURATION: 91 % | HEIGHT: 62 IN | DIASTOLIC BLOOD PRESSURE: 64 MMHG | RESPIRATION RATE: 12 BRPM | BODY MASS INDEX: 27.97 KG/M2 | SYSTOLIC BLOOD PRESSURE: 120 MMHG | HEART RATE: 83 BPM | WEIGHT: 152 LBS

## 2019-08-07 DIAGNOSIS — F41.9 ANXIETY: ICD-10-CM

## 2019-08-07 DIAGNOSIS — R53.83 FATIGUE, UNSPECIFIED TYPE: ICD-10-CM

## 2019-08-07 DIAGNOSIS — E03.9 ACQUIRED HYPOTHYROIDISM: ICD-10-CM

## 2019-08-07 DIAGNOSIS — K76.0 HEPATIC STEATOSIS: ICD-10-CM

## 2019-08-07 DIAGNOSIS — E78.5 DYSLIPIDEMIA: ICD-10-CM

## 2019-08-07 DIAGNOSIS — E03.9 HYPOTHYROIDISM, UNSPECIFIED TYPE: ICD-10-CM

## 2019-08-07 DIAGNOSIS — R74.8 ELEVATED LIVER ENZYMES: ICD-10-CM

## 2019-08-07 DIAGNOSIS — N83.8 OVARIAN MASS, RIGHT: ICD-10-CM

## 2019-08-07 DIAGNOSIS — E55.9 VITAMIN D DEFICIENCY: ICD-10-CM

## 2019-08-07 DIAGNOSIS — J45.40 MODERATE PERSISTENT ASTHMA WITHOUT COMPLICATION: ICD-10-CM

## 2019-08-07 PROCEDURE — 99214 OFFICE O/P EST MOD 30 MIN: CPT | Performed by: NURSE PRACTITIONER

## 2019-08-07 RX ORDER — ERGOCALCIFEROL 1.25 MG/1
50000 CAPSULE ORAL
Qty: 12 CAP | Refills: 0 | Status: SHIPPED | OUTPATIENT
Start: 2019-08-07 | End: 2019-12-10

## 2019-08-07 RX ORDER — SERTRALINE HYDROCHLORIDE 100 MG/1
50 TABLET, FILM COATED ORAL DAILY
Qty: 90 TAB | Refills: 1 | Status: SHIPPED | OUTPATIENT
Start: 2019-08-07 | End: 2019-12-10 | Stop reason: SDUPTHER

## 2019-08-07 RX ORDER — LEVOTHYROXINE SODIUM 0.1 MG/1
TABLET ORAL
Qty: 90 TAB | Refills: 3 | Status: SHIPPED | OUTPATIENT
Start: 2019-08-07 | End: 2020-09-23 | Stop reason: SDUPTHER

## 2019-08-07 NOTE — PROGRESS NOTES
CC: Lab results, medication refills, follow-up on CT of abdomen    HISTORY OF THE PRESENT ILLNESS: Patient is a 53 y.o. female. This pleasant patient is here today for evaluation management of the following health problems.    Health Maintenance: Patient reports colonoscopy done 2015 with normal results.  Will request records.      Dyslipidemia  Chronic health problem, worsening.  The 10-year ASCVD risk score (Jeannettevilma BELL Jr., et al., 2013) is: 1.5%.  Reviewed and recommendations with patient.  Statin quired at this time.  Did advise patient to decrease alcohol intake, low-fat diet, routine aerobic exercise.    Component      Latest Ref Rng & Units 4/18/2018 7/17/2019           6:49 AM  7:04 AM   Cholesterol,Tot      100 - 199 mg/dL 220 (H) 251 (H)   Triglycerides      0 - 149 mg/dL 164 (H) 211 (H)   HDL      >=40 mg/dL 73 69   LDL      <100 mg/dL 114 (H) 140 (H)           Anxiety  Chronic health problem, worsening control.  Taking Zoloft 50 mg daily.  Did run out of medication about a week ago.  Since discontinuing medication a week ago has noticed more fatigue.  Does report worsening symptoms while taking medication.  Patient feels like she is always wound up and stressed.  Has tried CBD without THC.  Just started exercising in the last week by power walking.  She does admit to drinking at least 3 shots of liquor a night.  She reports it helps relax her so she can get things done.  Denies feeling depressed, SI/HI.  She did have counseling in the past and did not find it helpful.      Acquired hypothyroidism  This is a chronic health problem that is well controlled with current medications and lifestyle measures.Taking levothyroxine 100 mcg daily.  Denies constipation, skin or hair changes, brain fog.  Does report some fatigue, likely related to running out of Zoloft.  Component      Latest Ref Rng & Units 7/17/2019           7:04 AM   TSH      0.380 - 5.330 uIU/mL 3.830         Vitamin D deficiency  Chronic health  "problem, uncontrolled.  Patient not taking supplemental vitamin D.  Serum vitamin D level is 18.  Reviewed with patient the importance of vitamin D and calcium absorption and bone strength.  Also reviewed that low levels of vitamin D can contribute to fatigue, anxiety, depression.  Will prescribe weekly high-dose vitamin D for 12 weeks.  Once done with weekly vitamin D, patient will start daily vitamin D3 4000 units.    Ovarian mass, right  Right ovarian mass incidentally seen on abdominal CT in 3/2019.  Follow-up ultrasound recommended in 6 to 12 months.  Patient denies abdominal pain, pain with intercourse.  LMP was 5 months ago.    Elevated liver enzymes  New finding of elevated liver enzymes.  Patient reports she is drinking at least 3 shots of alcohol a night.  Also found to have hepatic steatosis on abdominal CT in 3/2019.    Moderate persistent asthma without complication  Chronic health problem, improved control.  Using Symbicort inhaler twice daily, albuterol inhaler as needed.  Consulted with pulmonology and also had allergy testing done.  Reports she is allergic to \"everything.\"  Weekly allergy shots were advised.  Patient declined.  Was also advised to not sleep with her dog.  She reports she needs to sleep with her dog for comfort.    Hepatic steatosis  Hepatic steatosis is seen on abdominal CT in 3/2019.  Patient counseled on weight loss, routine aerobic exercise, decreasing alcohol intake.  Denies abdominal pain, nausea.      Allergies: Patient has no known allergies.    Current Outpatient Medications Ordered in Epic   Medication Sig Dispense Refill   • sertraline (ZOLOFT) 100 MG Tab Take 0.5 Tabs by mouth every day. 90 Tab 1   • vitamin D, Ergocalciferol, (DRISDOL) 06533 units Cap capsule Take 1 Cap by mouth every 7 days. 12 Cap 0   • levothyroxine (SYNTHROID) 100 MCG Tab Take one tab daily 1/2 hour prior to breakfast on an empty stomach. 90 Tab 3   • budesonide-formoterol (SYMBICORT) 160-4.5 MCG/ACT " "Aerosol Inhale 2 Puffs by mouth 2 Times a Day. Use with spacer.  Rinse mouth after each use. 1 Inhaler 11   • albuterol 108 (90 Base) MCG/ACT Aero Soln inhalation aerosol Inhale 1-2 Puffs by mouth every four hours as needed for Shortness of Breath. 1 Inhaler 3   • ipratropium-albuterol (DUONEB) 0.5-2.5 (3) MG/3ML nebulizer solution 3 mL by Nebulization route 4 times a day. 30 Bullet 2     No current Epic-ordered facility-administered medications on file.        Past Medical History:   Diagnosis Date   • Anxiety    • Asthma    • Carotid artery narrowing    • Thyroid disease        History reviewed. No pertinent surgical history.    Social History     Tobacco Use   • Smoking status: Never Smoker   • Smokeless tobacco: Never Used   Substance Use Topics   • Alcohol use: Yes     Alcohol/week: 0.6 oz     Types: 1 Shots of liquor per week     Comment: every few days   • Drug use: No       Family History   Problem Relation Age of Onset   • Hyperlipidemia Mother    • No Known Problems Father    • Asthma Sister        ROS:   As in HPI, otherwise negative for chest pain, dyspnea, abdominal pain, dysuria, blood in stool, fever           Exam: /64 (BP Location: Right arm, Patient Position: Sitting, BP Cuff Size: Adult)   Pulse 83   Temp 36.8 °C (98.3 °F)   Resp 12   Ht 1.575 m (5' 2\")   Wt 68.9 kg (152 lb)   SpO2 91%  Body mass index is 27.8 kg/m².    General: Alert, delightful, well nourished, well developed female in NAD  HEENT: Normocephalic. Eyes conjunctiva clear lids without ptosis, pupils equal and reactive to light, ears normal shape and contour, canals are clear bilaterally, tympanic membranes are pearly gray with good light reflex, nasal mucosa without erythema and drainage, oropharynx is without erythema, edema or exudates.   Neck: Supple without bruit. Thyroid is not enlarged.  Pulmonary: Clear to ausculation.  Normal effort. No rales, ronchi, or wheezing.  Cardiovascular: Normal rate and rhythm without " murmur. Carotid and radial pulses are intact and equal bilaterally.  No lower extremity edema.  Abdomen: Soft, nontender, nondistended. Normal bowel sounds. Liver and spleen are not palpable  Neurologic: Grossly nonfocal  Lymph: No cervical or supraclavicular lymph nodes are palpable  Skin: Warm and dry.   Musculoskeletal: Normal gait.   Psych: Normal mood and affect. Alert and oriented. Judgment and insight is normal.    Please note that this dictation was created using voice recognition software. I have made every reasonable attempt to correct obvious errors, but I expect that there are errors of grammar and possibly content that I did not discover before finalizing the note.      Assessment/Plan  1. Dyslipidemia  Worsening, though ASCVD risk is low.  Patient will work on lifestyle measures including weight loss, routine aerobic exercise, low-fat diet.  - Lipid Profile; Future    2. Anxiety  Will increase Zoloft to 100 mg.  Also work on lifestyle measures including routine aerobic exercise.  - sertraline (ZOLOFT) 100 MG Tab; Take 0.5 Tabs by mouth every day.  Dispense: 90 Tab; Refill: 1    3. Acquired hypothyroidism  Continue with levothyroxine 100 mcg daily.    4. Vitamin D deficiency  Patient will start high-dose weekly vitamin D for 12 weeks.  Once done, will take daily supplement of vitamin D3 4000 units.  - vitamin D, Ergocalciferol, (DRISDOL) 11414 units Cap capsule; Take 1 Cap by mouth every 7 days.  Dispense: 12 Cap; Refill: 0    5. Fatigue, unspecified type  Patient reports worsening fatigue in the last week since running out of Zoloft.  Also may be due to low vitamin D.  Patient to start vitamin D supplementation.  We will review lab results with patient next appointment.  - CBC WITHOUT DIFFERENTIAL; Future  - VITAMIN B12; Future    6. Elevated liver enzymes    - Comp Metabolic Panel; Future    7. Ovarian mass, right  Ovarian mass incidentally found on abdomen CT.  Patient will have follow-up  ultrasound.  - US-PELVIC COMPLETE (TRANSABDOMINAL/TRANSVAGINAL) (COMBO); Future    8. Hypothyroidism, unspecified type    - levothyroxine (SYNTHROID) 100 MCG Tab; Take one tab daily 1/2 hour prior to breakfast on an empty stomach.  Dispense: 90 Tab; Refill: 3    9. Moderate persistent asthma without complication  Continue with inhalers.    10. Hepatic steatosis  Asymptomatic.  Hepatic steatosis seen on CT, elevated liver enzymes, elevated triglycerides.  Patient reports drinking alcohol in excess nightly.  Strongly advised patient to decrease alcohol intake to no more than 1 drink a night for 5 nights a week.  Instructed also to work on weight loss and routine aerobic exercise.

## 2019-08-07 NOTE — ASSESSMENT & PLAN NOTE
Chronic health problem, worsening control.  Taking Zoloft 50 mg daily.  Did run out of medication about a week ago.  Since discontinuing medication a week ago has noticed more fatigue.  Does report worsening symptoms while taking medication.  Patient feels like she is always wound up and stressed.  Has tried CBD without THC.  Just started exercising in the last week by power walking.  She does admit to drinking at least 3 shots of liquor a night.  She reports it helps relax her so she can get things done.  Denies feeling depressed, SI/HI.  She did have counseling in the past and did not find it helpful.

## 2019-08-07 NOTE — ASSESSMENT & PLAN NOTE
Right ovarian mass incidentally seen on abdominal CT in 3/2019.  Follow-up ultrasound recommended in 6 to 12 months.  Patient denies abdominal pain, pain with intercourse.  LMP was 5 months ago.

## 2019-08-07 NOTE — PATIENT INSTRUCTIONS
Your vitamin D level is low, indicating you have vitamin D deficiency. Vitamin D is important in the absorption of calcium, which is instrumental in bone strength.  I have sent in a prescription to your pharmacy for Vitamin D 50,000 units once a week for 12 weeks.  Once you are done with weekly vitamin D, start taking over the counter supplemental Vitamin D3 4000 units daily.    Have ultrasound done in September    Decrease alcohol intake    Have fasting labs done prior to next appointment

## 2019-08-07 NOTE — LETTER
[facility]  NELL Zavala  [address] [place] [address]  Fax: [phone]   Authorization for Release/Disclosure of   Protected Health Information   Name: ZEINAB CHRISTIAN : 1965 SSN: [id number]   Address: [address] [place] [address] [address]  [place] [address] Phone:    [phone] (home)    I authorize the entity listed below to release/disclose the PHI below to:   [facility]/NELL Zavala and NELL Zavala   Provider or Entity Name:      [facility]     Address                                  [facility]   Phone:                  [phone]     Fax:                  [phone]   Reason for request: continuity of care   Information to be released:    [X] LAST COLONOSCOPY,  including any PATH REPORT and follow-up  [  ] LAST FIT/COLOGUARD RESULT [  ] LAST DEXA  [  ] LAST MAMMOGRAM  [  ] LAST PAP  [  ] LAST LABS [  ] RETINA EXAM REPORT  [  ] IMMUNIZATION RECORDS  [X] Resent visit notes      [  ] Check here and initial the line next to each item to release ALL health information INCLUDING  _____ Care and treatment for drug and / or alcohol abuse  _____ HIV testing, infection status, or AIDS  _____ Genetic Testing    DATES OF SERVICE OR TIME PERIOD TO BE DISCLOSED: _____________  I understand and acknowledge that:  * This Authorization may be revoked at any time by you in writing, except if your health information has already been used or disclosed.  * Your health information that will be used or disclosed as a result of you signing this authorization could be re-disclosed by the recipient. If this occurs, your re-disclosed health information may no longer be protected by State or Federal laws.  * You may refuse to sign this Authorization. Your refusal will not affect your ability to obtain treatment.  * This Authorization becomes effective upon signing and will  on (date) __________.      If no date is indicated, this Authorization  will  one (1) year from the signature date.    Name: Kev Garcia    Signature:   Date:     2019       PLEASE FAX REQUESTED RECORDS BACK TO: [phone]

## 2019-08-07 NOTE — ASSESSMENT & PLAN NOTE
Chronic health problem, worsening.  The 10-year ASCVD risk score (Jeannette ARABELLA Rowley., et al., 2013) is: 1.5%.  Reviewed and recommendations with patient.  Statin quired at this time.  Did advise patient to decrease alcohol intake, low-fat diet, routine aerobic exercise.    Component      Latest Ref Rng & Units 4/18/2018 7/17/2019           6:49 AM  7:04 AM   Cholesterol,Tot      100 - 199 mg/dL 220 (H) 251 (H)   Triglycerides      0 - 149 mg/dL 164 (H) 211 (H)   HDL      >=40 mg/dL 73 69   LDL      <100 mg/dL 114 (H) 140 (H)

## 2019-08-07 NOTE — ASSESSMENT & PLAN NOTE
"Chronic health problem, improved control.  Using Symbicort inhaler twice daily, albuterol inhaler as needed.  Consulted with pulmonology and also had allergy testing done.  Reports she is allergic to \"everything.\"  Weekly allergy shots were advised.  Patient declined.  Was also advised to not sleep with her dog.  She reports she needs to sleep with her dog for comfort.  "

## 2019-08-07 NOTE — ASSESSMENT & PLAN NOTE
Chronic health problem, uncontrolled.  Patient not taking supplemental vitamin D.  Serum vitamin D level is 18.  Reviewed with patient the importance of vitamin D and calcium absorption and bone strength.  Also reviewed that low levels of vitamin D can contribute to fatigue, anxiety, depression.  Will prescribe weekly high-dose vitamin D for 12 weeks.  Once done with weekly vitamin D, patient will start daily vitamin D3 4000 units.

## 2019-08-07 NOTE — ASSESSMENT & PLAN NOTE
New finding of elevated liver enzymes.  Patient reports she is drinking at least 3 shots of alcohol a night.  Also found to have hepatic steatosis on abdominal CT in 3/2019.

## 2019-08-07 NOTE — ASSESSMENT & PLAN NOTE
This is a chronic health problem that is well controlled with current medications and lifestyle measures.Taking levothyroxine 100 mcg daily.  Denies constipation, skin or hair changes, brain fog.  Does report some fatigue, likely related to running out of Zoloft.  Component      Latest Ref Rng & Units 7/17/2019           7:04 AM   TSH      0.380 - 5.330 uIU/mL 3.830

## 2019-08-07 NOTE — ASSESSMENT & PLAN NOTE
Hepatic steatosis is seen on abdominal CT in 3/2019.  Patient counseled on weight loss, routine aerobic exercise, decreasing alcohol intake.  Denies abdominal pain, nausea.

## 2019-09-28 ENCOUNTER — HOSPITAL ENCOUNTER (OUTPATIENT)
Dept: RADIOLOGY | Facility: MEDICAL CENTER | Age: 54
End: 2019-09-28
Attending: NURSE PRACTITIONER
Payer: COMMERCIAL

## 2019-09-28 DIAGNOSIS — N83.8 OVARIAN MASS, RIGHT: ICD-10-CM

## 2019-09-28 PROCEDURE — 76830 TRANSVAGINAL US NON-OB: CPT

## 2019-11-26 DIAGNOSIS — J45.21 MILD INTERMITTENT ASTHMA WITH ACUTE EXACERBATION: ICD-10-CM

## 2019-11-26 RX ORDER — ALBUTEROL SULFATE 90 UG/1
1-2 AEROSOL, METERED RESPIRATORY (INHALATION) EVERY 4 HOURS PRN
Qty: 1 INHALER | Refills: 3 | Status: SHIPPED | OUTPATIENT
Start: 2019-11-26 | End: 2020-06-10 | Stop reason: SDUPTHER

## 2019-11-26 NOTE — TELEPHONE ENCOUNTER
Was the patient seen in the last year in this department? Yes    Does patient have an active prescription for medications requested? No     Received Request Via: Pharmacy     Last OV 8/7/19, last labs 7/17/19

## 2019-12-09 ENCOUNTER — HOSPITAL ENCOUNTER (OUTPATIENT)
Dept: LAB | Facility: MEDICAL CENTER | Age: 54
End: 2019-12-09
Attending: NURSE PRACTITIONER
Payer: COMMERCIAL

## 2019-12-09 DIAGNOSIS — E78.5 DYSLIPIDEMIA: ICD-10-CM

## 2019-12-09 DIAGNOSIS — R74.8 ELEVATED LIVER ENZYMES: ICD-10-CM

## 2019-12-09 DIAGNOSIS — R53.83 FATIGUE, UNSPECIFIED TYPE: ICD-10-CM

## 2019-12-09 LAB
ALBUMIN SERPL BCP-MCNC: 4.7 G/DL (ref 3.2–4.9)
ALBUMIN/GLOB SERPL: 1.5 G/DL
ALP SERPL-CCNC: 81 U/L (ref 30–99)
ALT SERPL-CCNC: 110 U/L (ref 2–50)
ANION GAP SERPL CALC-SCNC: 10 MMOL/L (ref 0–11.9)
AST SERPL-CCNC: 109 U/L (ref 12–45)
BILIRUB SERPL-MCNC: 0.6 MG/DL (ref 0.1–1.5)
BUN SERPL-MCNC: 17 MG/DL (ref 8–22)
CALCIUM SERPL-MCNC: 9 MG/DL (ref 8.5–10.5)
CHLORIDE SERPL-SCNC: 104 MMOL/L (ref 96–112)
CHOLEST SERPL-MCNC: 295 MG/DL (ref 100–199)
CO2 SERPL-SCNC: 22 MMOL/L (ref 20–33)
CREAT SERPL-MCNC: 0.61 MG/DL (ref 0.5–1.4)
ERYTHROCYTE [DISTWIDTH] IN BLOOD BY AUTOMATED COUNT: 46 FL (ref 35.9–50)
FASTING STATUS PATIENT QL REPORTED: NORMAL
GLOBULIN SER CALC-MCNC: 3.2 G/DL (ref 1.9–3.5)
GLUCOSE SERPL-MCNC: 95 MG/DL (ref 65–99)
HCT VFR BLD AUTO: 42.8 % (ref 37–47)
HDLC SERPL-MCNC: 71 MG/DL
HGB BLD-MCNC: 14.1 G/DL (ref 12–16)
LDLC SERPL CALC-MCNC: 191 MG/DL
MCH RBC QN AUTO: 30.1 PG (ref 27–33)
MCHC RBC AUTO-ENTMCNC: 32.9 G/DL (ref 33.6–35)
MCV RBC AUTO: 91.3 FL (ref 81.4–97.8)
PLATELET # BLD AUTO: 223 K/UL (ref 164–446)
PMV BLD AUTO: 10.9 FL (ref 9–12.9)
POTASSIUM SERPL-SCNC: 3.8 MMOL/L (ref 3.6–5.5)
PROT SERPL-MCNC: 7.9 G/DL (ref 6–8.2)
RBC # BLD AUTO: 4.69 M/UL (ref 4.2–5.4)
SODIUM SERPL-SCNC: 136 MMOL/L (ref 135–145)
TRIGL SERPL-MCNC: 166 MG/DL (ref 0–149)
VIT B12 SERPL-MCNC: 1230 PG/ML (ref 211–911)
WBC # BLD AUTO: 6.7 K/UL (ref 4.8–10.8)

## 2019-12-09 PROCEDURE — 80061 LIPID PANEL: CPT

## 2019-12-09 PROCEDURE — 80053 COMPREHEN METABOLIC PANEL: CPT

## 2019-12-09 PROCEDURE — 85027 COMPLETE CBC AUTOMATED: CPT

## 2019-12-09 PROCEDURE — 36415 COLL VENOUS BLD VENIPUNCTURE: CPT

## 2019-12-09 PROCEDURE — 82607 VITAMIN B-12: CPT

## 2019-12-10 ENCOUNTER — OFFICE VISIT (OUTPATIENT)
Dept: MEDICAL GROUP | Facility: PHYSICIAN GROUP | Age: 54
End: 2019-12-10
Payer: COMMERCIAL

## 2019-12-10 VITALS
SYSTOLIC BLOOD PRESSURE: 120 MMHG | RESPIRATION RATE: 14 BRPM | HEART RATE: 69 BPM | HEIGHT: 62 IN | TEMPERATURE: 97 F | WEIGHT: 152 LBS | DIASTOLIC BLOOD PRESSURE: 80 MMHG | OXYGEN SATURATION: 97 % | BODY MASS INDEX: 27.97 KG/M2

## 2019-12-10 DIAGNOSIS — R31.9 HEMATURIA, UNSPECIFIED TYPE: ICD-10-CM

## 2019-12-10 DIAGNOSIS — F41.9 ANXIETY: ICD-10-CM

## 2019-12-10 DIAGNOSIS — F32.4 MAJOR DEPRESSIVE DISORDER WITH SINGLE EPISODE, IN PARTIAL REMISSION (HCC): ICD-10-CM

## 2019-12-10 DIAGNOSIS — J30.2 SEASONAL ALLERGIES: ICD-10-CM

## 2019-12-10 DIAGNOSIS — E78.5 DYSLIPIDEMIA: ICD-10-CM

## 2019-12-10 DIAGNOSIS — N83.8 OVARIAN MASS, RIGHT: ICD-10-CM

## 2019-12-10 DIAGNOSIS — R74.8 ELEVATED LIVER ENZYMES: ICD-10-CM

## 2019-12-10 DIAGNOSIS — K76.0 HEPATIC STEATOSIS: ICD-10-CM

## 2019-12-10 DIAGNOSIS — Z86.19 HISTORY OF HEPATITIS B: ICD-10-CM

## 2019-12-10 PROCEDURE — 99214 OFFICE O/P EST MOD 30 MIN: CPT | Performed by: NURSE PRACTITIONER

## 2019-12-10 RX ORDER — SERTRALINE HYDROCHLORIDE 100 MG/1
100 TABLET, FILM COATED ORAL DAILY
Qty: 90 TAB | Refills: 1 | Status: SHIPPED | OUTPATIENT
Start: 2019-12-10 | End: 2020-07-24 | Stop reason: SDUPTHER

## 2019-12-10 NOTE — ASSESSMENT & PLAN NOTE
"Patient continues to have elevated liver enzymes.  She reports she still drinking alcohol nightly, approximately 3-6 shots of alcohol a night.  Hepatic steatosis on abdominal CT in 3/2019.  She tells me today that she had hepatitis B in the 90s, \"mild case.\"  Did not receive any treatment.  Denies abdominal pain, changes in stool.  Component      Latest Ref Rng & Units 4/18/2018 7/17/2019 12/9/2019           6:49 AM  7:04 AM  8:51 AM   AST(SGOT)      12 - 45 U/L 33 90 (H) 109 (H)   ALT(SGPT)      2 - 50 U/L 32 87 (H) 110 (H)     "

## 2019-12-10 NOTE — ASSESSMENT & PLAN NOTE
Right ovarian mass incidentally seen on abdominal CT in 3/2019.  Follow-up ultrasound was negative for any adnexal masses.    9/28/2019 1:46 PM     HISTORY/REASON FOR EXAM:  Pelvic pain.        TECHNIQUE/EXAM DESCRIPTION:  Transabdominal and transvaginal pelvic ultrasound.     COMPARISON:   None     FINDINGS:  Both transabdominal and transvaginal scanning were performed to optimally visualize the pelvis.     The uterus measures 3.76 cm x 6.72 cm x 4.65 cm.  The uterine myometrium is heterogeneous.  The endometrial echo complex measures 0.27 cm.     Low resistive waveforms are confirmed within the ovaries.  The right ovary measures 1.50 cm x 2.30 cm x 1.20 cm.     The left ovary measures 1.89 cm x 2.62 cm x 1.80 cm.           There is no free fluid seen.     IMPRESSION:     1.  No evidence of adnexal mass.     2.  Heterogeneous uterus which may indicate presence of small fibroids.

## 2019-12-10 NOTE — PROGRESS NOTES
"CC: Lab review, follow-up on dyslipidemia and elevated liver enzymes    HISTORY OF THE PRESENT ILLNESS: Patient is a 54 y.o. female. This pleasant patient is here today for evaluation and management of the following health problems.    Health Maintenance: Patient reportedly had colonoscopy in 2015 at [facility].  Will request records.  She plans on getting flu vaccination at work.  Last mammogram was in May/2018 and normal.  Patient wanting to wait until middle of next year prior to next mammogram.    Dyslipidemia  Chronic health problem, worsening. The 10-year ASCVD risk score (Jeannette ARABELLA Jr., et al., 2013) is: 1.9%.  LDL greater than 190 and high intensity statin recommended.  Unfortunately, liver enzymes are elevated.   Reports hypercholesterolemia in mother, who maintains low fat diet and exercises regularly.  Will refer to vascular medicine clinic.      Component      Latest Ref Rng & Units 4/18/2018 7/17/2019 12/9/2019           6:49 AM  7:04 AM  8:51 AM   Cholesterol,Tot      100 - 199 mg/dL 220 (H) 251 (H) 295 (H)   Triglycerides      0 - 149 mg/dL 164 (H) 211 (H) 166 (H)   HDL      >=40 mg/dL 73 69 71   LDL      <100 mg/dL 114 (H) 140 (H) 191 (H)       Elevated liver enzymes  Patient continues to have elevated liver enzymes.  She reports she still drinking alcohol nightly, approximately 3-6 shots of alcohol a night.  Hepatic steatosis on abdominal CT in 3/2019.  She tells me today that she had hepatitis B in the 90s, \"mild case.\"  Did not receive any treatment.  Denies abdominal pain, changes in stool.  Component      Latest Ref Rng & Units 4/18/2018 7/17/2019 12/9/2019           6:49 AM  7:04 AM  8:51 AM   AST(SGOT)      12 - 45 U/L 33 90 (H) 109 (H)   ALT(SGPT)      2 - 50 U/L 32 87 (H) 110 (H)       Ovarian mass, right  Right ovarian mass incidentally seen on abdominal CT in 3/2019.  Follow-up ultrasound was negative for any adnexal masses.    9/28/2019 1:46 PM     HISTORY/REASON FOR EXAM:  Pelvic " pain.        TECHNIQUE/EXAM DESCRIPTION:  Transabdominal and transvaginal pelvic ultrasound.     COMPARISON:   None     FINDINGS:  Both transabdominal and transvaginal scanning were performed to optimally visualize the pelvis.     The uterus measures 3.76 cm x 6.72 cm x 4.65 cm.  The uterine myometrium is heterogeneous.  The endometrial echo complex measures 0.27 cm.     Low resistive waveforms are confirmed within the ovaries.  The right ovary measures 1.50 cm x 2.30 cm x 1.20 cm.     The left ovary measures 1.89 cm x 2.62 cm x 1.80 cm.           There is no free fluid seen.     IMPRESSION:     1.  No evidence of adnexal mass.     2.  Heterogeneous uterus which may indicate presence of small fibroids.    Major depressive disorder with single episode, in partial remission (HCC)  Patient reports finding no juanito in things she used to look forward to such as crocheting.  Reports she has no motivation to do anything and positive for fatigue.  After work, she lays down and does not want to leave the house. Sleeps about 5 hours a night and shift starts at 4 a.m.  Denies tearfulness, SI/HI.  Does have history of anxiety and takes Zoloft 50 mg daily.  Has been taking it for a couple years.    Hematuria  Denies blood in urine.    Seasonal allergies  Patient reports nasal congestion since discontinuing cetirizine couple weeks ago.    Denies sore throat, otalgia, cough, fever.  She will restart cetirizine.      Allergies: Patient has no known allergies.    Current Outpatient Medications Ordered in Epic   Medication Sig Dispense Refill   • sertraline (ZOLOFT) 100 MG Tab Take 1 Tab by mouth every day. 90 Tab 1   • albuterol 108 (90 Base) MCG/ACT Aero Soln inhalation aerosol Inhale 1-2 Puffs by mouth every four hours as needed for Shortness of Breath. 1 Inhaler 3   • levothyroxine (SYNTHROID) 100 MCG Tab Take one tab daily 1/2 hour prior to breakfast on an empty stomach. 90 Tab 3   • budesonide-formoterol (SYMBICORT) 160-4.5  "MCG/ACT Aerosol Inhale 2 Puffs by mouth 2 Times a Day. Use with spacer.  Rinse mouth after each use. 1 Inhaler 11   • ipratropium-albuterol (DUONEB) 0.5-2.5 (3) MG/3ML nebulizer solution 3 mL by Nebulization route 4 times a day. 30 Bullet 2     No current Epic-ordered facility-administered medications on file.        Past Medical History:   Diagnosis Date   • Anxiety    • Asthma    • Carotid artery narrowing    • Thyroid disease        History reviewed. No pertinent surgical history.    Social History     Tobacco Use   • Smoking status: Never Smoker   • Smokeless tobacco: Never Used   Substance Use Topics   • Alcohol use: Yes     Alcohol/week: 0.6 oz     Types: 1 Shots of liquor per week     Comment: every few days   • Drug use: No       Family History   Problem Relation Age of Onset   • Hyperlipidemia Mother    • No Known Problems Father    • Asthma Sister        ROS:   As in HPI, otherwise negative for chest pain, dyspnea, abdominal pain, dysuria, blood in stool, fever           Exam: /80 (BP Location: Left arm, Patient Position: Sitting, BP Cuff Size: Adult)   Pulse 69   Temp 36.1 °C (97 °F)   Resp 14   Ht 1.575 m (5' 2\")   Wt 68.9 kg (152 lb)   SpO2 97%  Body mass index is 27.8 kg/m².    General: Alert, pleasant, well nourished, well developed female in NAD  HEENT: Normocephalic. Eyes conjunctiva clear lids without ptosis, pupils equal and reactive to light, ears normal shape and contour, canals are clear bilaterally, tympanic membranes are pearly gray with good light reflex, nasal mucosa pink and boggy without drainage, oropharynx is without erythema, edema or exudates.   Neck: Supple without bruit. Thyroid is not enlarged.  Pulmonary: Clear to ausculation.  Normal effort. No rales, ronchi, or wheezing.  Cardiovascular: Normal rate and rhythm without murmur. Carotid and radial pulses are intact and equal bilaterally.  No lower extremity edema.  Abdomen: Soft, nontender, nondistended. Normal bowel " sounds. Liver and spleen are not palpable  Neurologic: Grossly nonfocal  Lymph: No cervical or supraclavicular lymph nodes are palpable  Skin: Warm and dry.    Musculoskeletal: Normal gait.   Psych: Normal mood and affect. Alert and oriented. Judgment and insight is normal.    Please note that this dictation was created using voice recognition software. I have made every reasonable attempt to correct obvious errors, but I expect that there are errors of grammar and possibly content that I did not discover before finalizing the note.      Assessment/Plan  1. Dyslipidemia  High risk as LDL is greater than 190.  Unfortunately, statin may be contraindicated because of elevated liver enzymes.  Did advise on lifestyle measures.  Will refer to vascular medicine for further evaluation.  - REFERRAL TO VASCULAR MEDICINE Reason for Referral? Lipids    2. Elevated liver enzymes  Asymptomatic. Continues with elevated liver enzymes.  History of hepatis B, liver steatosis on abdominal CT.  Continues to drink high amount of alcohol.  Strongly advised to stop drinking alcohol or to decrease intake.  Will refer to gastroenterology for further evaluation.    - HEP C VIRUS ANTIBODY; Future  - IRON/TOTAL IRON BIND; Future  - REFERRAL TO GASTROENTEROLOGY  - REFERRAL TO VASCULAR MEDICINE Reason for Referral? Lipids    3. Hepatic steatosis    - REFERRAL TO GASTROENTEROLOGY    4. History of hepatitis B    - REFERRAL TO GASTROENTEROLOGY    5. Anxiety    - sertraline (ZOLOFT) 100 MG Tab; Take 1 Tab by mouth every day.  Dispense: 90 Tab; Refill: 1    6. Ovarian mass, right  Resolved.    7. Major depressive disorder with single episode, in partial remission (HCC)  Will increase Zoloft to 100 mg daily.  Advised patient to consistently 6 weeks to see if he can help with motivation and lack of juanito.    8. Hematuria, unspecified type  Resolved.    9. Seasonal allergies  Patient will restart cetirizine.    Patient will consult with both vascular  medicine and gastroenterology.  She understands the importance of these consultations.  Patient will return to clinic in 6 months or sooner if needed.

## 2019-12-10 NOTE — ASSESSMENT & PLAN NOTE
Patient reports nasal congestion since discontinuing cetirizine couple weeks ago.    Denies sore throat, otalgia, cough, fever.  She will restart cetirizine.

## 2019-12-10 NOTE — ASSESSMENT & PLAN NOTE
Chronic health problem, worsening. The 10-year ASCVD risk score ([place] ARABELLA Jr., et al., 2013) is: 1.9%.  LDL greater than 190 and high intensity statin recommended.  Unfortunately, liver enzymes are elevated.   Reports hypercholesterolemia in mother, who maintains low fat diet and exercises regularly.  Will refer to vascular medicine clinic.      Component      Latest Ref Rng & Units 4/18/2018 7/17/2019 12/9/2019           6:49 AM  7:04 AM  8:51 AM   Cholesterol,Tot      100 - 199 mg/dL 220 (H) 251 (H) 295 (H)   Triglycerides      0 - 149 mg/dL 164 (H) 211 (H) 166 (H)   HDL      >=40 mg/dL 73 69 71   LDL      <100 mg/dL 114 (H) 140 (H) 191 (H)

## 2019-12-10 NOTE — ASSESSMENT & PLAN NOTE
Patient reports finding no juanito in things she used to look forward to such as crocheting.  Reports she has no motivation to do anything and positive for fatigue.  After work, she lays down and does not want to leave the house. Sleeps about 5 hours a night and shift starts at 4 a.m.  Denies tearfulness, SI/HI.  Does have history of anxiety and takes Zoloft 50 mg daily.  Has been taking it for a couple years.

## 2019-12-11 ENCOUNTER — TELEPHONE (OUTPATIENT)
Dept: VASCULAR LAB | Facility: MEDICAL CENTER | Age: 54
End: 2019-12-11

## 2019-12-23 ENCOUNTER — TELEPHONE (OUTPATIENT)
Dept: VASCULAR LAB | Facility: MEDICAL CENTER | Age: 54
End: 2019-12-23

## 2020-01-03 ENCOUNTER — TELEPHONE (OUTPATIENT)
Dept: VASCULAR LAB | Facility: MEDICAL CENTER | Age: 55
End: 2020-01-03

## 2020-03-02 ENCOUNTER — HOSPITAL ENCOUNTER (OUTPATIENT)
Dept: LAB | Facility: MEDICAL CENTER | Age: 55
End: 2020-03-02
Attending: PHYSICIAN ASSISTANT
Payer: COMMERCIAL

## 2020-03-02 LAB
ALBUMIN SERPL BCP-MCNC: 4.5 G/DL (ref 3.2–4.9)
ALP SERPL-CCNC: 72 U/L (ref 30–99)
ALT SERPL-CCNC: 39 U/L (ref 2–50)
AST SERPL-CCNC: 41 U/L (ref 12–45)
BILIRUB CONJ SERPL-MCNC: <0.1 MG/DL (ref 0.1–0.5)
BILIRUB INDIRECT SERPL-MCNC: NORMAL MG/DL (ref 0–1)
BILIRUB SERPL-MCNC: 0.4 MG/DL (ref 0.1–1.5)
FERRITIN SERPL-MCNC: 108.2 NG/ML (ref 10–291)
HBV SURFACE AB SERPL IA-ACNC: <3.1 MIU/ML (ref 0–10)
HBV SURFACE AG SER QL: NEGATIVE
HCV AB SER QL: NEGATIVE
INR PPP: 0.97 (ref 0.87–1.13)
IRON SATN MFR SERPL: 18 % (ref 15–55)
IRON SERPL-MCNC: 80 UG/DL (ref 40–170)
PROT SERPL-MCNC: 8.1 G/DL (ref 6–8.2)
PROTHROMBIN TIME: 13.1 SEC (ref 12–14.6)
TIBC SERPL-MCNC: 435 UG/DL (ref 250–450)

## 2020-03-02 PROCEDURE — 36415 COLL VENOUS BLD VENIPUNCTURE: CPT

## 2020-03-02 PROCEDURE — 86803 HEPATITIS C AB TEST: CPT

## 2020-03-02 PROCEDURE — 80076 HEPATIC FUNCTION PANEL: CPT

## 2020-03-02 PROCEDURE — 86038 ANTINUCLEAR ANTIBODIES: CPT

## 2020-03-02 PROCEDURE — 85610 PROTHROMBIN TIME: CPT

## 2020-03-02 PROCEDURE — 83516 IMMUNOASSAY NONANTIBODY: CPT

## 2020-03-02 PROCEDURE — 82107 ALPHA-FETOPROTEIN L3: CPT

## 2020-03-02 PROCEDURE — 86706 HEP B SURFACE ANTIBODY: CPT

## 2020-03-02 PROCEDURE — 86708 HEPATITIS A ANTIBODY: CPT

## 2020-03-02 PROCEDURE — 82728 ASSAY OF FERRITIN: CPT

## 2020-03-02 PROCEDURE — 83550 IRON BINDING TEST: CPT

## 2020-03-02 PROCEDURE — 83540 ASSAY OF IRON: CPT

## 2020-03-02 PROCEDURE — 86704 HEP B CORE ANTIBODY TOTAL: CPT

## 2020-03-02 PROCEDURE — 87340 HEPATITIS B SURFACE AG IA: CPT

## 2020-03-03 LAB — HBV CORE AB SERPL QL IA: REACTIVE

## 2020-03-04 LAB
AFP L3 MFR SERPL: <0.5 % (ref 0–9.9)
AFP SERPL-MCNC: 2 NG/ML (ref 0–15)
HAV AB SER QL IA: NEGATIVE
NUCLEAR IGG SER QL IA: NORMAL
SMA IGG SER-ACNC: 4 UNITS (ref 0–19)

## 2020-03-08 ENCOUNTER — HOSPITAL ENCOUNTER (OUTPATIENT)
Dept: RADIOLOGY | Facility: MEDICAL CENTER | Age: 55
End: 2020-03-08
Attending: PHYSICIAN ASSISTANT
Payer: COMMERCIAL

## 2020-03-08 DIAGNOSIS — B18.1 HEPATITIS B CARRIER (HCC): ICD-10-CM

## 2020-03-08 DIAGNOSIS — R74.8 ELEVATED LIVER ENZYMES: ICD-10-CM

## 2020-03-08 PROCEDURE — 76700 US EXAM ABDOM COMPLETE: CPT

## 2020-04-20 RX ORDER — BUDESONIDE AND FORMOTEROL FUMARATE DIHYDRATE 160; 4.5 UG/1; UG/1
AEROSOL RESPIRATORY (INHALATION)
Qty: 1 INHALER | Refills: 0 | Status: SHIPPED | OUTPATIENT
Start: 2020-04-20 | End: 2021-08-17 | Stop reason: SDUPTHER

## 2020-04-20 NOTE — TELEPHONE ENCOUNTER
Have we ever prescribed this med? Yes.  If yes, what date? 04/08/19(Stepan)    Last OV: 04/08/19 with Dr Ying   Return in about 3 months (around 7/8/2019).       Next OV: No Pending appt.     DX: Moderate persistent asthma without complication    Medications:   Requested Prescriptions     Pending Prescriptions Disp Refills   • SYMBICORT 160-4.5 MCG/ACT Aerosol [Pharmacy Med Name: SYMBICORT 160-4.5MCG/ACT AERO] 10.2 g 11     Sig: INHALE TWO PUFFS BY MOUTH TWICE A DAY. RINSE MOUTH AFTER EACH USE

## 2020-05-28 ENCOUNTER — TELEPHONE (OUTPATIENT)
Dept: VASCULAR LAB | Facility: MEDICAL CENTER | Age: 55
End: 2020-05-28

## 2020-06-03 ENCOUNTER — TELEPHONE (OUTPATIENT)
Dept: VASCULAR LAB | Facility: MEDICAL CENTER | Age: 55
End: 2020-06-03

## 2020-06-04 NOTE — TELEPHONE ENCOUNTER
Patient referred to vascular care  Unfortunately our schedulers have been unable to reach patient despite multiple attempts  Unless patient establishes with a face-to-face visit in our office will be unable to take part in care  Await further patient contact.  Pending further contact, we will defer all further management of vascular disease and its risk factors to PCP and/or referring MD.    Michael J. Bloch, MD  [facility]    cc:    LANEY Tineo

## 2020-06-10 ENCOUNTER — TELEMEDICINE (OUTPATIENT)
Dept: MEDICAL GROUP | Facility: PHYSICIAN GROUP | Age: 55
End: 2020-06-10
Payer: COMMERCIAL

## 2020-06-10 DIAGNOSIS — F41.9 ANXIETY: ICD-10-CM

## 2020-06-10 DIAGNOSIS — J45.40 MODERATE PERSISTENT ASTHMA WITHOUT COMPLICATION: ICD-10-CM

## 2020-06-10 DIAGNOSIS — E55.9 VITAMIN D DEFICIENCY: ICD-10-CM

## 2020-06-10 DIAGNOSIS — E78.5 DYSLIPIDEMIA: ICD-10-CM

## 2020-06-10 DIAGNOSIS — R74.8 ELEVATED LIVER ENZYMES: ICD-10-CM

## 2020-06-10 DIAGNOSIS — J45.21 MILD INTERMITTENT ASTHMA WITH ACUTE EXACERBATION: ICD-10-CM

## 2020-06-10 DIAGNOSIS — E03.9 ACQUIRED HYPOTHYROIDISM: ICD-10-CM

## 2020-06-10 PROBLEM — Z01.419 WELL WOMAN EXAM WITH ROUTINE GYNECOLOGICAL EXAM: Status: RESOLVED | Noted: 2018-05-25 | Resolved: 2020-06-10

## 2020-06-10 PROCEDURE — 99213 OFFICE O/P EST LOW 20 MIN: CPT | Mod: 95,CR | Performed by: NURSE PRACTITIONER

## 2020-06-10 RX ORDER — ALBUTEROL SULFATE 90 UG/1
1-2 AEROSOL, METERED RESPIRATORY (INHALATION) EVERY 4 HOURS PRN
Qty: 1 INHALER | Refills: 3 | Status: SHIPPED | OUTPATIENT
Start: 2020-06-10 | End: 2022-07-20 | Stop reason: SDUPTHER

## 2020-06-10 ASSESSMENT — FIBROSIS 4 INDEX: FIB4 SCORE: 1.59

## 2020-06-10 NOTE — ASSESSMENT & PLAN NOTE
As a means of avoiding spread of COVID-19, this visit is being conducted by video.  Patient was referred to vascular medicine as LDL was greater than 190 and also had elevated liver enzymes.  Patient has not scheduled appointment yet.  Playing phone tag.  She will call again today.  Did offer to start patient on a statin today in the meantime.  Patient declines as she would like to get an updated lipid profile prior to starting any medications.  Has been taking over-the-counter supplements including milk thistle.  Has been working on weight loss through [employer].

## 2020-06-10 NOTE — ASSESSMENT & PLAN NOTE
Chronic health problem, uncertain control.  Taking levothyroxine 100 mcg daily.  Does report increasing fatigue last few weeks.

## 2020-06-10 NOTE — ASSESSMENT & PLAN NOTE
As a means of avoiding spread of COVID-19, this visit is being conducted by video.  Lives with gastroenterology and follow-up liver enzymes were normal.  Patient does have history of hepatitis B and she was to have another blood test done, but has not done so.  She will get hepatitis B blood test, ordered by GI, and schedule follow-up with GI.  Denies abdominal pain, nausea, vomiting.

## 2020-06-10 NOTE — PROGRESS NOTES
Telemedicine Visit: Established Patient     This encounter was conducted via Zoom .   Verbal consent was obtained. Patient's identity was verified.    Subjective:   CC: Follow-up on chronic health problems  Kev Garcia is a 54 y.o. female presenting for evaluation and management of:    Vitamin D deficiency  As a means of avoiding spread of COVID-19, this visit is being conducted by video.  Chronic health problem, uncertain of control.  Patient taking vitamin D3 2000 units daily.    Elevated liver enzymes  As a means of avoiding spread of COVID-19, this visit is being conducted by video.  Lives with gastroenterology and follow-up liver enzymes were normal.  Patient does have history of hepatitis B and she was to have another blood test done, but has not done so.  She will get hepatitis B blood test, ordered by GI, and schedule follow-up with GI.  Denies abdominal pain, nausea, vomiting.    Dyslipidemia  As a means of avoiding spread of COVID-19, this visit is being conducted by video.  Patient was referred to vascular medicine as LDL was greater than 190 and also had elevated liver enzymes.  Patient has not scheduled appointment yet.  Playing phone tag.  She will call again today.  Did offer to start patient on a statin today in the meantime.  Patient declines as she would like to get an updated lipid profile prior to starting any medications.  Has been taking over-the-counter supplements including milk thistle.  Has been working on weight loss through [employer].    Acquired hypothyroidism  Chronic health problem, uncertain control.  Taking levothyroxine 100 mcg daily.  Does report increasing fatigue last few weeks.    Anxiety  As a means of avoiding spread of COVID-19, this visit is being conducted by video.    Chronic problem.  Zoloft was increased to 100 mg at last appointment.  Patient reports she is still having some issues with anxiety. Relieved with CBD oil.    Moderate persistent asthma without  complication  As a means of avoiding spread of COVID-19, this visit is being conducted by video.  Chronic problem.  Using Symbicort inhaler twice a day.  Reports having to use albuterol inhaler more often now that required to wear a mask at work.  She is changed to a mask with vents, hoping this will help.  She does report increased fatigue.        ROS   Denies any recent fevers or chills. No nausea or vomiting. No chest pains or shortness of breath.  Positive fatigue    No Known Allergies    Current medicines (including changes today)  Current Outpatient Medications   Medication Sig Dispense Refill   • albuterol 108 (90 Base) MCG/ACT Aero Soln inhalation aerosol Inhale 1-2 Puffs by mouth every four hours as needed for Shortness of Breath. 1 Inhaler 3   • SYMBICORT 160-4.5 MCG/ACT Aerosol INHALE TWO PUFFS BY MOUTH TWICE A DAY. RINSE MOUTH AFTER EACH USE 1 Inhaler 0   • sertraline (ZOLOFT) 100 MG Tab Take 1 Tab by mouth every day. 90 Tab 1   • levothyroxine (SYNTHROID) 100 MCG Tab Take one tab daily 1/2 hour prior to breakfast on an empty stomach. 90 Tab 3   • ipratropium-albuterol (DUONEB) 0.5-2.5 (3) MG/3ML nebulizer solution 3 mL by Nebulization route 4 times a day. 30 Bullet 2     No current facility-administered medications for this visit.        Patient Active Problem List    Diagnosis Date Noted   • Major depressive disorder with single episode, in partial remission (HCC) 12/10/2019   • Seasonal allergies 12/10/2019   • Ovarian mass, right 08/07/2019   • Elevated liver enzymes 08/07/2019   • Hepatic steatosis 08/07/2019   • Vitamin D deficiency 05/08/2018   • Dyslipidemia 05/08/2018   • Anxiety 04/17/2018   • Acquired hypothyroidism 04/17/2018   • Moderate persistent asthma without complication 02/10/2018       Family History   Problem Relation Age of Onset   • Hyperlipidemia Mother    • No Known Problems Father    • Asthma Sister        She  has a past medical history of Anxiety, Asthma, Carotid artery  "narrowing, and Thyroid disease.  She  has no past surgical history on file.       Objective:   Resp 14   Ht 1.575 m (5' 2\")   Wt 65.8 kg (145 lb)   BMI 26.52 kg/m²     Physical Exam:  Constitutional: Alert, no distress, well-groomed.  Skin: No rashes in visible areas.  Eye: Round. Conjunctiva clear, lids normal. No icterus.   ENMT: Lips pink without lesions, good dentition, moist mucous membranes. Phonation normal.  Neck: No masses, no thyromegaly. Moves freely without pain.  Respiratory: Unlabored respiratory effort, no cough or audible wheeze  Psych: Alert and oriented x3, normal affect and mood.       Assessment and Plan:   The following treatment plan was discussed:     1. Mild intermittent asthma with acute exacerbation  Fairly well controlled with Symbicort.  Noticing having to use albuterol more now that wearing mask at work.  We will continue to monitor.  - albuterol 108 (90 Base) MCG/ACT Aero Soln inhalation aerosol; Inhale 1-2 Puffs by mouth every four hours as needed for Shortness of Breath.  Dispense: 1 Inhaler; Refill: 3    2. Dyslipidemia  Patient will call to schedule appointment with vascular medicine.  Did offer to prescribe a statin today as liver function tests have improved.  Patient declines, and would like to discuss further with vascular medicine.  - Comp Metabolic Panel; Future  - Lipid Profile; Future  - ESTIMATED GFR; Future    3. Elevated liver enzymes  Improved.  Patient will call to schedule follow-up appointment with GI.  We will continue to monitor.  - Comp Metabolic Panel; Future    4. Vitamin D deficiency  3 vitamin D deficiency.  Will get updated levels.  - VITAMIN D,25 HYDROXY; Future    5. Acquired hypothyroidism  Clinically euthyroid.  Will get updated thyroid levels.  In the meantime continue with current dose of medication.  - TSH WITH REFLEX TO FT4; Future    6. Anxiety  Mildly improved with CBD oil.  Consider hydroxyzine or BuSpar.  Will review further at next " appointment.    7. Moderate persistent asthma without complication      Follow-up: Patient will call for follow-up appointment for 6 to 8 weeks for follow-up on anxiety and depression and lab review.

## 2020-06-10 NOTE — ASSESSMENT & PLAN NOTE
As a means of avoiding spread of COVID-19, this visit is being conducted by video.    Chronic problem.  Zoloft was increased to 100 mg at last appointment.  Patient reports she is still having some issues with anxiety. Relieved with CBD oil.

## 2020-06-11 VITALS — WEIGHT: 145 LBS | RESPIRATION RATE: 14 BRPM | BODY MASS INDEX: 26.68 KG/M2 | HEIGHT: 62 IN

## 2020-06-12 NOTE — ASSESSMENT & PLAN NOTE
As a means of avoiding spread of COVID-19, this visit is being conducted by video.  Chronic problem.  Using Symbicort inhaler twice a day.  Reports having to use albuterol inhaler more often now that required to wear a mask at work.  She is changed to a mask with vents, hoping this will help.  She does report increased fatigue.

## 2020-07-24 ENCOUNTER — OFFICE VISIT (OUTPATIENT)
Dept: VASCULAR LAB | Facility: MEDICAL CENTER | Age: 55
End: 2020-07-24
Attending: FAMILY MEDICINE
Payer: COMMERCIAL

## 2020-07-24 VITALS
HEART RATE: 66 BPM | BODY MASS INDEX: 27.05 KG/M2 | DIASTOLIC BLOOD PRESSURE: 78 MMHG | SYSTOLIC BLOOD PRESSURE: 119 MMHG | HEIGHT: 62 IN | WEIGHT: 147 LBS

## 2020-07-24 DIAGNOSIS — E03.9 ACQUIRED HYPOTHYROIDISM: ICD-10-CM

## 2020-07-24 DIAGNOSIS — F41.9 ANXIETY: ICD-10-CM

## 2020-07-24 DIAGNOSIS — K76.0 HEPATIC STEATOSIS: ICD-10-CM

## 2020-07-24 DIAGNOSIS — R74.8 ELEVATED LIVER ENZYMES: ICD-10-CM

## 2020-07-24 DIAGNOSIS — E55.9 VITAMIN D DEFICIENCY: ICD-10-CM

## 2020-07-24 DIAGNOSIS — E78.00 PRIMARY HYPERCHOLESTEROLEMIA: ICD-10-CM

## 2020-07-24 PROCEDURE — 99212 OFFICE O/P EST SF 10 MIN: CPT | Performed by: FAMILY MEDICINE

## 2020-07-24 PROCEDURE — 99204 OFFICE O/P NEW MOD 45 MIN: CPT | Performed by: FAMILY MEDICINE

## 2020-07-24 RX ORDER — SERTRALINE HYDROCHLORIDE 100 MG/1
100 TABLET, FILM COATED ORAL DAILY
Qty: 90 TAB | Refills: 3 | Status: SHIPPED | OUTPATIENT
Start: 2020-07-24 | End: 2021-08-17

## 2020-07-24 ASSESSMENT — FIBROSIS 4 INDEX: FIB4 SCORE: 1.59

## 2020-07-24 ASSESSMENT — ENCOUNTER SYMPTOMS
BRUISES/BLEEDS EASILY: 0
TREMORS: 0
FOCAL WEAKNESS: 0
BLOOD IN STOOL: 0
CHILLS: 0
PALPITATIONS: 0
DEPRESSION: 0
NAUSEA: 0
INSOMNIA: 0
BLURRED VISION: 0
NERVOUS/ANXIOUS: 0
HEMOPTYSIS: 0
WHEEZING: 0
ABDOMINAL PAIN: 0
WEAKNESS: 0
SHORTNESS OF BREATH: 0
DOUBLE VISION: 0
SORE THROAT: 0
DIARRHEA: 0
HEADACHES: 0
DIZZINESS: 0
COUGH: 0
MYALGIAS: 0
ORTHOPNEA: 0
SEIZURES: 0
FEVER: 0
VOMITING: 0

## 2020-07-24 NOTE — PROGRESS NOTES
[facility] - Initial Visit  Date of Service: 07/24/20    Kev Garcia has been referred for evaluation and management of dyslipidemia    Referral Source: Holly Tineo    HPI  Reports no current symptoms   Reports goals as improved future ascvd risk, prefers off meds.      History of ASCVD: No  Other Established (non-atherosclerotic) Vascular Disease, if Present: None  Age at Initial Diagnosis of Dyslipidemia: many years     Current Prescription Lipid Lowering Medications - including dose:   Statin: None  Non-Statin: None  Current Lipid Lowering and Related Supplements:   None  Any Current Side Effects Potentially Related to Lipid Lowering therapy?   No  Current Adherence to Lipid Lowering Therapies   Complete  Previously Attempted Interventions for Lipids - including outcome  Statin: None     Outcome: N/A  Non-Statin: None    Outcome: N/A  Any Previous History of Statin Intolerance?   No  Baseline Lipids Prior to Treatment:   Lab Results   Component Value Date/Time    CHOLSTRLTOT 295 (H) 12/09/2019 08:51 AM     (H) 12/09/2019 08:51 AM    HDL 71 12/09/2019 08:51 AM    TRIGLYCERIDE 166 (H) 12/09/2019 08:51 AM       Other Pertinent History:     Anticoagulation/antiplats:  no    History of other CV risk factors:    Had prior duplex with left carotid art stenosis, CTA neck was normal in 2016    HTN:  No prior HTN or antiHTN meds.     T2D:  No     Other:    Hypothyroidism:  Stable, no issues. Tolerating meds.  Last TSH normal     Liver disease:   Noted to have hepatic steatosis in the past.  Hx of HBV+, most recent labs showed HBV core Ab positive but HBsAg negative, indicating likely recurrent, active disease w/o immunity.   Had elevated ALT, AST w/o elevated T bili.  Remainder of w/u neg.  No prior statin-induced acute liver injury   Seeing GI     PAST MEDICAL HISTORY:  has a past medical history of Anxiety, Asthma, Carotid artery narrowing, Hyperlipidemia, Hypothyroid, and Thyroid  disease.    PAST SURGICAL HISTORY:  has no past surgical history on file.    CURRENT MEDICATIONS:   Current Outpatient Medications:   •  CALCIUM PO, Take  by mouth., Taking  •  MILK THISTLE PO, Take  by mouth., Taking  •  Cyanocobalamin (B-12 PO), Take  by mouth., Taking  •  TURMERIC PO, Take  by mouth. Takes in liquid form, Taking  •  Coenzyme Q10 (COQ10 PO), Take  by mouth. Taking in liquid form, Taking  •  NATTOKINASE PO, Take  by mouth., Taking  •  Vitamin D-3, Take  by mouth.  •  albuterol, 1-2 Puff, Inhalation, Q4HRS PRN, PRN  •  Symbicort, INHALE TWO PUFFS BY MOUTH TWICE A DAY. RINSE MOUTH AFTER EACH USE, Taking  •  sertraline, 100 mg, Oral, DAILY, Taking  •  levothyroxine, Take one tab daily 1/2 hour prior to breakfast on an empty stomach., Taking  •  ipratropium-albuterol, 3 mL, Nebulization, 4XDAY, Taking    ALLERGIES: Patient has no known allergies.    FAMILY HISTORY:   Family History   Problem Relation Age of Onset   • Hyperlipidemia Mother         severe    • No Known Problems Father    • Asthma Sister    • No Known Problems Maternal Grandmother    • No Known Problems Maternal Grandfather    • Stroke Neg Hx    • Heart Attack Neg Hx        SOCIAL HISTORY   Social History     Tobacco Use   • Smoking status: Never Smoker   • Smokeless tobacco: Never Used   Substance Use Topics   • Alcohol use: Yes     Alcohol/week: 0.6 oz     Types: 1 Shots of liquor per week     Comment: every few days   • Drug use: No       Change in weight: Stable  BMI Readings from Last 5 Encounters:   07/24/20 26.89 kg/m²   06/10/20 26.52 kg/m²   12/10/19 27.80 kg/m²   08/07/19 27.80 kg/m²   04/08/19 27.62 kg/m²   ]  Exercise habits: minimal exercise  Diet: common adult, low fat    Review of Systems   Constitutional: Negative for chills, fever and malaise/fatigue.   HENT: Negative for nosebleeds, sore throat and tinnitus.    Eyes: Negative for blurred vision and double vision.   Respiratory: Negative for cough, hemoptysis, shortness  "of breath and wheezing.    Cardiovascular: Negative for chest pain, palpitations, orthopnea and leg swelling.   Gastrointestinal: Negative for abdominal pain, blood in stool, diarrhea, melena, nausea and vomiting.   Genitourinary: Negative for hematuria.   Musculoskeletal: Negative for joint pain and myalgias.   Skin: Negative for itching and rash.   Neurological: Negative for dizziness, tremors, focal weakness, seizures, weakness and headaches.   Endo/Heme/Allergies: Does not bruise/bleed easily.   Psychiatric/Behavioral: Negative for depression. The patient is not nervous/anxious and does not have insomnia.      Vitals:    07/24/20 1437   BP: 119/78   BP Location: Right arm   Patient Position: Sitting   BP Cuff Size: Adult   Pulse: 66   Weight: 66.7 kg (147 lb)   Height: 1.575 m (5' 2\")      BP Readings from Last 5 Encounters:   07/24/20 119/78   12/10/19 120/80   08/07/19 120/64   04/08/19 108/70   02/05/19 120/86   ]  Physical Exam   Constitutional: She is oriented to person, place, and time. She appears well-developed and well-nourished. No distress.   HENT:   Head: Normocephalic and atraumatic.   Neck: Normal range of motion. Neck supple. No JVD present. No thyromegaly present.   Cardiovascular: Normal rate, regular rhythm, normal heart sounds and intact distal pulses. Exam reveals no gallop and no friction rub.   No murmur heard.  Pulses:       Carotid pulses are 2+ on the right side and 2+ on the left side.       Radial pulses are 2+ on the right side and 2+ on the left side.        Dorsalis pedis pulses are 2+ on the right side and 2+ on the left side.        Posterior tibial pulses are 2+ on the right side and 2+ on the left side.   Edema     RLE: none     LLE: none   Spider telangectasia:       RLE:  None      LLE: none   Varicosities:         RLE: none      LLE: none   Corona phlebectatica:      RLE:  None       LLE:  None   Cording:         RLE:  None     LLE: None      Pulmonary/Chest: Effort normal " and breath sounds normal.   Abdominal: Soft. Bowel sounds are normal. She exhibits no distension and no mass. There is no abdominal tenderness. There is no rebound and no guarding.   Musculoskeletal: Normal range of motion.         General: No tenderness.   Neurological: She is alert and oriented to person, place, and time. No cranial nerve deficit. She exhibits normal muscle tone. Coordination normal.   Skin: Skin is warm and dry. She is not diaphoretic.   Psychiatric: She has a normal mood and affect.       DATA REVIEW:  Most Recent Lipid Panel:   Lab Results   Component Value Date    CHOLSTRLTOT 295 (H) 2019    TRIGLYCERIDE 166 (H) 2019    HDL 71 2019     (H) 2019       Other Pertinent Blood Work:   Lab Results   Component Value Date    SODIUM 136 2019    POTASSIUM 3.8 2019    CHLORIDE 104 2019    CO2 22 2019    ANION 10.0 2019    GLUCOSE 95 2019    BUN 17 2019    CREATININE 0.61 2019    CALCIUM 9.0 2019    ASTSGOT 41 2020    ALTSGPT 39 2020    ALKPHOSPHAT 72 2020    TBILIRUBIN 0.4 2020    ALBUMIN 4.5 2020    AGRATIO 1.5 2019    TSHULTRASEN 3.830 2019     Recent Imaging Studies:    VASCULAR IMAGING:     Last EKG:   Results for orders placed or performed during the hospital encounter of 16   EKG (NOW)   Result Value Ref Range    Report       [facility]    Test Date:  2016  Pt Name:    ZEINAB CHRISTIAN                  Department: ER  MRN:        [id number]                      Room:       [facility]  Gender:     F                            Technician: [id number]  :        1965                   Requested By:BRYANNA CONTRERAS  Order #:    [id number]                    Reading MD: BRYANNA CONTRERAS MD    Measurements  Intervals                                Axis  Rate:       58                           P:          34  DC:         156                           QRS:        47  QRSD:       82                           T:          33  QT:         476  QTc:        468    Interpretive Statements  SINUS BRADYCARDIA  No previous ECG available for comparison    Electronically Signed On 1- 11:12:53 PST by BRYANNA CONTRERAS MD       US abd 3/8/20  No sonographic evidence of cirrhosis, portal hypertension or other worrisome finding    Carotid 2015  1.  There is no visible atherosclerotic plaque.   2.  There is no evidence of carotid occlusion.   3. Diameter reduction in the internal carotid arteries is estimated at 50-70% on the left and less than 50% on the right.   4. Vertebral arteries demonstrate antegrade flow.     CTA head/neck 2016   Normal   1. No evidence of flow-limiting stenosis in the cervical carotid or cervical vertebral arteries.    CT abd/pelvis 3/2019  There is mild scattered arterial calcification. The abdominal aorta is ectatic.  1.  No acute intra-abdominal findings.   2.  Hepatic steatosis.   3.  Atherosclerosis.   4.  2.3 cm hypodense mass in the right ovary likely represents a cyst and is likely benign. Assuming postmenopausal state, follow-up ultrasound is recommended in 6-12 months    ASSESSMENT AND PLAN  1. Primary hypercholesterolemia  US-TOTAL VASCULAR SCREENING W/CIMT (S/P)    APOLIPOPROTEIN B    Comp Metabolic Panel    CREATINE KINASE    CRP HIGH SENSITIVE (CARDIAC)    LDL, DIRECT    LipoFit by NMR    LIPOPROTEIN A    TSH   2. Elevated liver enzymes     3. Acquired hypothyroidism     4. Vitamin D deficiency  VITAMIN D,25 HYDROXY   5. Hepatic steatosis     6. BMI 26.0-26.9,adult         Patient Type, check all that apply:   Primary Prevention    Established Atherosclerotic Cardiovascular Disease (ASCVD)  No    Other Established (non-atherosclerotic) Vascular Disease, if Present:  None    Evidence of Heterozygous Familial Hypercholesterolemia (FH):   Possible (LDL >190, mother with severe HLD)    ACC/AHA Indication for Statin Therapy, zleda  all that apply:  LDL-C at baseline >190 mg/dl: Indication for Moderate intensity statin   - Prior LDL (not on meds) was in the 110-120s range     Calculated Risk for ASCVD, if applicable    N/A    Other Significant Risk Markers, if any, zelda all that apply   Other: pending advanced lipid panel, total vasc screen with CIMT    Goal LDL-C and nonHDL-C based on Clinic Protocol  LDL-C:   <100 mg/dL - not at goal     Lifestyle Recommendations From Today’s Visit:      Smoking:  reports that she has never smoked. She has never used smokeless tobacco.   - continued complete avoidance of all tobacco products     Physical Activity: continue healthy activity to improve CV fitness, see care instructions for additional details     Weight Management and Nutrition: Dietary plan was discussed with patient at this visit including DASH, low sodium and/or as outlined in care instructions   Eating Plan: Concentrate on  Low sat/Trans fat, Low simple carb, Calorie restricted and DASH/Med Style diet    Statin Therapy Recommendations from Today’s Visit:   - low threshold to start at least moderate-intensity statin but will give intensified TLC measure 3mo first  - current declines statin therapy   - would recommend initiation with rosuva 5-10mg m/w/f and monitor liver enzymes   - NLA consensus statement (2014) reports liver enzyme elevation does not contraindicate use of statin    Non-Statin Medications Recommendations from Today’s Visit:   - could consider zetia as primary alternative to statin, though statin preferred     Indication for PCSK9 Inhibitor, if applicable:  Not currently indicated    Supplements Recommended at this visit:   - start vit D3 5000 units daily  - start cholest-off   - trial of berberine, bergamot, or artichoke leaf extract      Antithrombotic therapy:  Not indicated     Recommendations for Other Cardiovascular Risk Factors, zelda all that apply:     1) Blood Pressure Management:Goal: ACC/AHA (2017) goal  <130/80  Home BP at goal: yes  Office BP at goal:  yes  Echo: n/a  UACR: n/a   Plan:   Monitoring:   - start/continue home BP monitoring, reviewed correct technique:  Yes   - order 24h ABPM:  NO  Medications: no meds indicated at this time     2) Glycemic Status: Normal    Other Issues:  1) liver enzyme elevations, resolve.  Were mild in the past, ALT/AST only, T bili and ALP normal.   Hx of possible recurrent HBV based upon HBV testing panel   Does not preclude use of statin therapy for HLD   - will monitor liver closely if statin therapy initiated and with each titration step     Studies Ordered at Todays Visit: total vasc screen with CIMT - ordered, aprn to track   Blood Work Ordered At Today’s visit: as noted above   Follow-Up: 3 months    Total 45 minutes face-to-face time spent with patient, with greater than 50% of the total time counseling on above topic       Ko Martin M.D.    CC:  NELL Zavala Kathleen A, *

## 2020-07-24 NOTE — PATIENT INSTRUCTIONS
- please contact the [facility] [place] [facility] to obtain free genetic testing to help us determine if you have a genetic mutation that can cause Familial Hypercholesterolemia   Phone: [phone]     - continue all current medications, see updated medication list for changes     BLOOD PRESSURE:  - if you notice BP > 140/>90 for more than 3 days, then contact our office ([facility], [phone]) for further instructions    If you are being treated for blood pressure today: please be advised that stabilizing BP may require multiple med changes and close monitoring over the next several months and initially there may be additional imaging and labs and the possibility of adverse drug reactions. Variability of your blood pressure and heart rate may occur until we have things stabilized.  Please feel free to contact our office as needed for questions or concerns.        SODIUM RESTRICTIONS:   - consume no more than 2,300mg of sodium daily (look at food labels) ,or if you have high BP then reduce to no more than 1,500mg of sodium daily   For more information visit: https://www.[employer]/contents/low-sodium-diet-the-basics/print?xcqklRog=6449&source=see_link       DASH DIET:  Overview: (https://www.heart.org/en/health-topics/high-blood-pressure/changes-you-can-make-to-manage-high-blood-pressure/managing-blood-pressure-with-a-heart-healthy-diet)  Tips for shopping:  https://www.[facility].org/healthy-lifestyle/nutrition-and-healthy-eating/in-depth/dash-diet/art-80788570?p=1   DASH is a flexible and balanced eating plan that helps create a heart-healthy eating style for life.  The DASH eating plan requires no special foods and instead provides daily and weekly nutritional goals. This plan recommends:  · Eating vegetables, fruits, and whole grains  · Including fat-free or low-fat dairy products, fish, poultry, beans, nuts, and vegetable oils  · Limiting foods that are high in saturated fat, such as fatty  meats, full-fat dairy products, and tropical oils such as coconut, palm kernel, and palm oils  · Limiting sugar-sweetened beverages and sweets.  Based on these recommendations, the following table shows examples of daily and weekly servings that meet DASH eating plan targets for a 2,534-jdwkyzz-x-day diet.    CHOLESTEROL-REDUCING DIETS:  1) AHA diet  (http://www.heart.org/en/healthy-living/healthy-eating/eat-smart/nutrition-basics/aha-diet-and-lifestyle-recommendations)   2) Mediterranean diet (http://www.heart.org/en/healthy-living/healthy-eating/eat-smart/nutrition-basics/mediterranean-diet)   3) Lowering triglycerides: (http://my.americanheart.org/idc/groups/ahamah-public/@wc/@sop/@[facility]/documents/downloadable/[facility]_425988.pdf)     PHYSICAL ACTIVITY:  Unless directed otherwise at today's visit or you are physically incapable due to your current health or medical conditions, it is advised per the American Heart Association to engage in moderate to vigorous physical activity such as brisk walking, swimming, cycling, >150 minutes per week at 30-40 minutes per session, 3 to 5 times weekly.      GENERAL HEALTHY DIET ADVICE:  - the USDA food pattern (https://www.cnpp.usda.gov/USDAFoodPatterns)  - plate method (https://www.choosemyplate.gov/)  - consume diet that emphasizes intake of vegetables, fruits, and whole grains,  - use low fat diary products, poultry, fish, legumes, nontropical vegetable oils, nuts  - limit intake of sweets, sugar-sweetned beverages, and red meats   - reduce saturated and trans fats to <6% of your daily calories

## 2020-08-17 ENCOUNTER — TELEPHONE (OUTPATIENT)
Dept: VASCULAR LAB | Facility: MEDICAL CENTER | Age: 55
End: 2020-08-17

## 2020-08-18 NOTE — TELEPHONE ENCOUNTER
Called pt to remind that the  Total vas screening  is due for surveillance. Scheduling office and our office phone numbers provided. DEBI Lawson.

## 2020-08-24 ENCOUNTER — TELEPHONE (OUTPATIENT)
Dept: VASCULAR LAB | Facility: MEDICAL CENTER | Age: 55
End: 2020-08-24

## 2020-09-04 ENCOUNTER — HOSPITAL ENCOUNTER (OUTPATIENT)
Dept: RADIOLOGY | Facility: MEDICAL CENTER | Age: 55
End: 2020-09-04
Attending: FAMILY MEDICINE
Payer: COMMERCIAL

## 2020-09-04 DIAGNOSIS — E78.00 PRIMARY HYPERCHOLESTEROLEMIA: ICD-10-CM

## 2020-09-04 PROCEDURE — 306723 US-TOTAL VASCULAR SCREENING (S/P)

## 2020-09-04 PROCEDURE — 0126T US-CIMT DUPLEX: CPT

## 2020-09-08 ENCOUNTER — TELEPHONE (OUTPATIENT)
Dept: VASCULAR LAB | Facility: MEDICAL CENTER | Age: 55
End: 2020-09-08

## 2020-09-08 NOTE — TELEPHONE ENCOUNTER
Sent via result note.   Good news that you do not have a lot of cholesterol build-up on the exam at this time indicating a good baseline, however with your bad LDL cholesterol >190 this can change over the next several years, you would still be recommended to use lifestyle changes and medication to lower your LDL to a goal of less than 100.  Keep your follow-up appointment to review in more detail

## 2020-09-23 DIAGNOSIS — E03.9 HYPOTHYROIDISM, UNSPECIFIED TYPE: ICD-10-CM

## 2020-09-23 RX ORDER — LEVOTHYROXINE SODIUM 0.1 MG/1
TABLET ORAL
Qty: 90 TAB | Refills: 3 | Status: SHIPPED | OUTPATIENT
Start: 2020-09-23 | End: 2021-08-17 | Stop reason: SDUPTHER

## 2020-09-23 NOTE — TELEPHONE ENCOUNTER
Received request via: Pharmacy    Was the patient seen in the last year in this department? Yes    Does the patient have an active prescription (recently filled or refills available) for medication(s) requested? No     Last OV 6/2020

## 2020-10-29 ENCOUNTER — OFFICE VISIT (OUTPATIENT)
Dept: VASCULAR LAB | Facility: MEDICAL CENTER | Age: 55
End: 2020-10-29
Attending: FAMILY MEDICINE
Payer: COMMERCIAL

## 2020-10-29 VITALS
WEIGHT: 148.4 LBS | HEIGHT: 62 IN | DIASTOLIC BLOOD PRESSURE: 68 MMHG | SYSTOLIC BLOOD PRESSURE: 118 MMHG | HEART RATE: 79 BPM | BODY MASS INDEX: 27.31 KG/M2

## 2020-10-29 DIAGNOSIS — E55.9 VITAMIN D DEFICIENCY: ICD-10-CM

## 2020-10-29 DIAGNOSIS — K76.0 HEPATIC STEATOSIS: ICD-10-CM

## 2020-10-29 DIAGNOSIS — R74.8 ELEVATED LIVER ENZYMES: ICD-10-CM

## 2020-10-29 DIAGNOSIS — E78.00 PRIMARY HYPERCHOLESTEROLEMIA: ICD-10-CM

## 2020-10-29 DIAGNOSIS — E03.9 ACQUIRED HYPOTHYROIDISM: ICD-10-CM

## 2020-10-29 PROCEDURE — 99212 OFFICE O/P EST SF 10 MIN: CPT

## 2020-10-29 PROCEDURE — 99214 OFFICE O/P EST MOD 30 MIN: CPT | Performed by: FAMILY MEDICINE

## 2020-10-29 ASSESSMENT — ENCOUNTER SYMPTOMS
MYALGIAS: 0
HEMOPTYSIS: 0
NAUSEA: 0
ABDOMINAL PAIN: 0
HEADACHES: 0
CHILLS: 0
DOUBLE VISION: 0
ORTHOPNEA: 0
VOMITING: 0
BLOOD IN STOOL: 0
PALPITATIONS: 0
FOCAL WEAKNESS: 0
DIARRHEA: 0
WEAKNESS: 0
SEIZURES: 0
FEVER: 0
NERVOUS/ANXIOUS: 0
INSOMNIA: 0
WHEEZING: 0
SORE THROAT: 0
BRUISES/BLEEDS EASILY: 0
DEPRESSION: 0
COUGH: 0
BLURRED VISION: 0
DIZZINESS: 0
SHORTNESS OF BREATH: 0
TREMORS: 0

## 2020-10-29 ASSESSMENT — FIBROSIS 4 INDEX: FIB4 SCORE: 1.59

## 2020-10-29 NOTE — PROGRESS NOTES
[facility] - Follow-up  Date of Service: 10/29/20     Kev Garcia has been referred for evaluation and management of dyslipidemia    Referral Source: Holly Tineo    HPI  Interval hx/concerns: had total vasc screen with [facility], but has not done labs yet. Has not implemented any new TLC.        History of ASCVD: No  Other Established (non-atherosclerotic) Vascular Disease, if Present: None  Age at Initial Diagnosis of Dyslipidemia: many years     Current Prescription Lipid Lowering Medications - including dose:   Statin: None  Non-Statin: None  Current Lipid Lowering and Related Supplements:   None  Any Current Side Effects Potentially Related to Lipid Lowering therapy?   No  Current Adherence to Lipid Lowering Therapies   Complete  Previously Attempted Interventions for Lipids - including outcome  Statin: None     Outcome: N/A  Non-Statin: None    Outcome: N/A  Any Previous History of Statin Intolerance?   No  Baseline Lipids Prior to Treatment:      Ref. Range 12/9/2019 08:51   Cholesterol,Tot Latest Ref Range: 100 - 199 mg/dL 295 (H)   Triglycerides Latest Ref Range: 0 - 149 mg/dL 166 (H)   HDL Latest Ref Range: >=40 mg/dL 71   LDL Latest Ref Range: <100 mg/dL 191 (H)     Other Pertinent History:     Anticoagulation/antiplats:  no    History of other CV risk factors:    Had prior duplex with left carotid art stenosis, CTA neck was normal in 2016    HTN:  No prior HTN or antiHTN meds.     Other:    Hypothyroidism:  Stable, no issues. Tolerating meds.  Last TSH normal     Liver disease:   Stable, pending labs.   Noted to have hepatic steatosis in the past.  Hx of HBV+, most recent labs showed HBV core Ab positive but HBsAg negative, indicating likely recurrent, active disease w/o immunity.   Had elevated ALT, AST w/o elevated T bili.  Remainder of w/u neg.  No prior statin-induced acute liver injury   Seeing GI     CURRENT MEDICATIONS:   Current Outpatient Medications:   •  levothyroxine, Take  one tab daily 1/2 hour prior to breakfast on an empty stomach., Taking  •  sertraline, 100 mg, Oral, DAILY, Taking  •  CALCIUM PO, Take  by mouth., Taking  •  MILK THISTLE PO, Take  by mouth., Taking  •  Cyanocobalamin (B-12 PO), Take  by mouth., Taking  •  TURMERIC PO, Take  by mouth. Takes in liquid form, Taking  •  Coenzyme Q10 (COQ10 PO), Take  by mouth. Taking in liquid form, Taking  •  NATTOKINASE PO, Take  by mouth., Taking  •  Vitamin D-3, Take  by mouth., Taking  •  albuterol, 1-2 Puff, Inhalation, Q4HRS PRN, Taking  •  Symbicort, INHALE TWO PUFFS BY MOUTH TWICE A DAY. RINSE MOUTH AFTER EACH USE, Taking  •  ipratropium-albuterol, 3 mL, Nebulization, 4XDAY, Taking    ALLERGIES: Patient has no known allergies.    SOCIAL HISTORY   Social History     Tobacco Use   • Smoking status: Never Smoker   • Smokeless tobacco: Never Used   Substance Use Topics   • Alcohol use: Yes     Alcohol/week: 0.6 oz     Types: 1 Shots of liquor per week     Comment: every few days   • Drug use: No     Change in weight: Stable  BMI Readings from Last 5 Encounters:   10/29/20 27.14 kg/m²   07/24/20 26.89 kg/m²   06/10/20 26.52 kg/m²   12/10/19 27.80 kg/m²   08/07/19 27.80 kg/m²   ]  Exercise habits: minimal exercise  Diet: common adult, low fat    Review of Systems   Constitutional: Negative for chills, fever and malaise/fatigue.   HENT: Negative for nosebleeds, sore throat and tinnitus.    Eyes: Negative for blurred vision and double vision.   Respiratory: Negative for cough, hemoptysis, shortness of breath and wheezing.    Cardiovascular: Negative for chest pain, palpitations, orthopnea and leg swelling.   Gastrointestinal: Negative for abdominal pain, blood in stool, diarrhea, melena, nausea and vomiting.   Genitourinary: Negative for hematuria.   Musculoskeletal: Negative for joint pain and myalgias.   Skin: Negative for itching and rash.   Neurological: Negative for dizziness, tremors, focal weakness, seizures, weakness and  "headaches.   Endo/Heme/Allergies: Does not bruise/bleed easily.   Psychiatric/Behavioral: Negative for depression. The patient is not nervous/anxious and does not have insomnia.      Vitals:    10/29/20 1525   BP: 118/68   BP Location: Left arm   Patient Position: Sitting   BP Cuff Size: Adult   Pulse: 79   Weight: 67.3 kg (148 lb 6.4 oz)   Height: 1.575 m (5' 2\")      BP Readings from Last 5 Encounters:   10/29/20 118/68   07/24/20 119/78   12/10/19 120/80   08/07/19 120/64   04/08/19 108/70   ]  Physical Exam   Constitutional: She is oriented to person, place, and time. She appears well-developed and well-nourished. No distress.   HENT:   Head: Normocephalic and atraumatic.   Neck: Normal range of motion. Neck supple. No JVD present. No thyromegaly present.   Cardiovascular: Normal rate, regular rhythm, normal heart sounds and intact distal pulses. Exam reveals no gallop and no friction rub.   No murmur heard.  Pulses:       Carotid pulses are 2+ on the right side and 2+ on the left side.       Radial pulses are 2+ on the right side and 2+ on the left side.        Dorsalis pedis pulses are 2+ on the right side and 2+ on the left side.        Posterior tibial pulses are 2+ on the right side and 2+ on the left side.   Edema     RLE: none     LLE: none   Spider telangectasia:       RLE:  None      LLE: none   Varicosities:         RLE: none      LLE: none   Corona phlebectatica:      RLE:  None       LLE:  None   Cording:         RLE:  None     LLE: None      Pulmonary/Chest: Effort normal and breath sounds normal.   Abdominal: Soft. Bowel sounds are normal. She exhibits no distension and no mass. There is no abdominal tenderness. There is no rebound and no guarding.   Musculoskeletal: Normal range of motion.         General: No tenderness.   Neurological: She is alert and oriented to person, place, and time. No cranial nerve deficit. She exhibits normal muscle tone. Coordination normal.   Skin: Skin is warm and dry. " She is not diaphoretic.   Psychiatric: She has a normal mood and affect.     DATA REVIEW:  Most Recent Lipid Panel:   Lab Results   Component Value Date    CHOLSTRLTOT 295 (H) 12/09/2019    TRIGLYCERIDE 166 (H) 12/09/2019    HDL 71 12/09/2019     (H) 12/09/2019     Other Pertinent Blood Work:   Lab Results   Component Value Date    SODIUM 136 12/09/2019    POTASSIUM 3.8 12/09/2019    CHLORIDE 104 12/09/2019    CO2 22 12/09/2019    ANION 10.0 12/09/2019    GLUCOSE 95 12/09/2019    BUN 17 12/09/2019    CREATININE 0.61 12/09/2019    CALCIUM 9.0 12/09/2019    ASTSGOT 41 03/02/2020    ALTSGPT 39 03/02/2020    ALKPHOSPHAT 72 03/02/2020    TBILIRUBIN 0.4 03/02/2020    ALBUMIN 4.5 03/02/2020    AGRATIO 1.5 12/09/2019    TSHULTRASEN 3.830 07/17/2019        Ref. Range 7/17/2019 07:04   25-Hydroxy   Vitamin D 25 Latest Ref Range: 30 - 100 ng/mL 18 (L)      Ref. Range 3/2/2020 06:16   AST(SGOT) Latest Ref Range: 12 - 45 U/L 41   ALT(SGPT) Latest Ref Range: 2 - 50 U/L 39   Alkaline Phosphatase Latest Ref Range: 30 - 99 U/L 72   Total Bilirubin Latest Ref Range: 0.1 - 1.5 mg/dL 0.4   Direct Bilirubin Latest Ref Range: 0.1 - 0.5 mg/dL <0.1   Indirect Bilirubin Latest Ref Range: 0.0 - 1.0 mg/dL see below   Albumin Latest Ref Range: 3.2 - 4.9 g/dL 4.5   Total Protein Latest Ref Range: 6.0 - 8.2 g/dL 8.1   Iron Latest Ref Range: 40 - 170 ug/dL 80   Total Iron Binding Latest Ref Range: 250 - 450 ug/dL 435   % Saturation Latest Ref Range: 15 - 55 % 18        Ref. Range 3/2/2020 06:16   AFP L3% Latest Ref Range: 0.0 - 9.9 % <0.5   Alpha Fetoprotein Latest Ref Range: 0 - 15 ng/mL 2   Ferritin Latest Ref Range: 10.0 - 291.0 ng/mL 108.2   Hep A Virus Antibody Total Latest Ref Range: Negative  Negative   Hep B Surface Antibody Quant Latest Ref Range: 0.00 - 10.00 mIU/mL <3.10   Hepatitis B Surface Antigen Latest Ref Range: Negative  Negative   Hepatitis B Core Ab, Total Latest Ref Range: Negative  Reactive (A)   Hepatitis C Antibody  Latest Ref Range: Negative  Negative   F-Actin Ab, IgG Latest Ref Range: 0 - 19 Units 4   Antinuclear Antibody Latest Ref Range: None Detected  None Detected     Recent Imaging Studies:    VASCULAR IMAGING:     Last EKG:   Results for orders placed or performed during the hospital encounter of 16   EKG (NOW)   Result Value Ref Range    Report       [facility] Emergency Dept.    Test Date:  2016  Pt Name:    ZEINAB CHRISTIAN                  Department: ER  MRN:        [id number]                      Room:       [facility]  Gender:     F                            Technician: [id number]  :        1965                   Requested By:BRYANNA CONTRERAS  Order #:    [id number]                    Reading MD: BRYANNA CONTRERAS MD    Measurements  Intervals                                Axis  Rate:       58                           P:          34  UT:         156                          QRS:        47  QRSD:       82                           T:          33  QT:         476  QTc:        468    Interpretive Statements  SINUS BRADYCARDIA  No previous ECG available for comparison    Electronically Signed On 2017 11:12:53 PST by BRYANAN CONTRERAS MD       US abd 3/8/20  No sonographic evidence of cirrhosis, portal hypertension or other worrisome finding    Carotid 2015  1.  There is no visible atherosclerotic plaque.   2.  There is no evidence of carotid occlusion.   3. Diameter reduction in the internal carotid arteries is estimated at 50-70% on the left and less than 50% on the right.   4. Vertebral arteries demonstrate antegrade flow.     CTA head/neck    Normal   1. No evidence of flow-limiting stenosis in the cervical carotid or cervical vertebral arteries.    CT abd/pelvis 3/2019  There is mild scattered arterial calcification. The abdominal aorta is ectatic.  1.  No acute intra-abdominal findings.   2.  Hepatic steatosis.   3.  Atherosclerosis.   4.  2.3 cm hypodense mass in the right  ovary likely represents a cyst and is likely benign. Assuming postmenopausal state, follow-up ultrasound is recommended in 6-12 months    Total vasc screen with [facility] 9/4/20   ABDOMINAL AORTA SCAN:   No aneurysm of the abdominal aorta.   No plaque observed in the abdominal aorta.   The maximum diameter of the aorta is 1.8 cm.    Doppler velocity is normal at 117 cm/s.      ANKLE-BRACHIAL INDEX (STANFORD):   All waveforms are brisk and triphasic.   Right STANFORD is 1.27   Left STANFORD is 1.21   STANFORD's are normal.      CONCLUSIONS   Mild plaque in the right carotid artery with less than 50% stenosis of the    internal carotid.    Normal screening exam of the left carotid artery.   No aneurysm of the abdominal aorta.    Normal bilateral STANFORD's.     Average vascular age is 55 years as determined by measurement of the   bilateral    carotid intima-medial thickness.   3% risk for a cardiovascular event in the next 10 years according to the    [place] cardiovascular disease risk model.    ASSESSMENT AND PLAN  1. Primary hypercholesterolemia  Lipid Profile    Comp Metabolic Panel   2. Elevated liver enzymes     3. Acquired hypothyroidism     4. Vitamin D deficiency     5. Hepatic steatosis     6. BMI 26.0-26.9,adult       Patient Type, check all that apply:   Primary Prevention    Established Atherosclerotic Cardiovascular Disease (ASCVD)  No    Other Established (non-atherosclerotic) Vascular Disease, if Present:  None    Evidence of Heterozygous Familial Hypercholesterolemia (FH):   Possible (LDL >190, mother with severe HLD)  Though mother has severe HLD, no early fhx of ascvd events.      ACC/AHA Indication for Statin Therapy, zelda all that apply:  LDL-C at baseline >190 mg/dl: Indication for Moderate intensity statin   - Prior LDL (not on meds) was in the 110-120s range     Calculated Risk for ASCVD, if applicable    N/A    Other Significant Risk Markers, if any, zelda all that apply   Total vasc screen, [facility]:  Normal, vasc age 53,  3% 10-yr risk     Goal LDL-C and nonHDL-C based on Clinic Protocol  LDL-C:   <100 mg/dL - not at goal     Lifestyle Recommendations From Today’s Visit:      Smoking:  reports that she has never smoked. She has never used smokeless tobacco.   - continued complete avoidance of all tobacco products     Physical Activity: continue healthy activity to improve CV fitness, see care instructions for additional details     Weight Management and Nutrition: Dietary plan was discussed with patient at this visit including DASH, low sodium and/or as outlined in care instructions   Eating Plan: Concentrate on  Low sat/Trans fat, Low simple carb, Calorie restricted and DASH/Med Style diet    Statin Therapy Recommendations from Today’s Visit:   Reviewed labs in context of total vasc screen and through shared decision-making she would like to start TLC and supplements and consider statin therapy in the future.   She is aware she will likely need statin.    - current declines statin therapy   - would recommend initiation with rosuva 5-10mg m/w/f and monitor liver enzymes   - NLA consensus statement (2014) reports liver enzyme elevation does not contraindicate use of statin    Non-Statin Medications Recommendations from Today’s Visit:   - could consider zetia as primary alternative to statin, though statin preferred     Indication for PCSK9 Inhibitor, if applicable:  Not currently indicated    Supplements Recommended at this visit:   - start vit D3 5000 units daily  - start cholest-off   - trial of berberine, bergamot, or artichoke leaf extract      Antithrombotic therapy:  Not indicated     Recommendations for Other Cardiovascular Risk Factors, zelda all that apply:     1) Blood Pressure Management:Goal: ACC/AHA (2017) goal <130/80   Home BP at goal: yes   Office BP at goal:  yes  Echo: n/a  UACR: n/a   Plan:   Monitoring:   - start/continue home BP monitoring, reviewed correct technique:  Yes   - order 24h ABPM:  NO  Medications: no  meds indicated at this time     2) Glycemic Status: Normal    Other Issues:  1) liver enzyme elevations, resolved.    Hx of possible recurrent HBV based upon HBV testing panel   Does not preclude use of statin therapy for HLD   - will monitor liver closely if statin therapy initiated and with each titration step     Studies Ordered at Todays Visit: none   Blood Work Ordered At Today’s visit: as noted above   Follow-Up: 6mo     Total 25 minutes face-to-face time spent with patient, with greater than 50% of the total time counseling on risk/benefits of therapy, anticipated natural hx of ascvd in  Context of severe HLD.  TLC, supplements       Ko Martin M.D.    CC:  NELL Zavala

## 2020-12-18 NOTE — TELEPHONE ENCOUNTER
Received a refill request from Krysten in [place] for symbicort. Denied last seen 4/08/19 and we wanted to see her back in couple of months.     Voice message also left for patient.     When last filled we asked that patient make an appointment to follow up.

## 2020-12-28 ENCOUNTER — HOSPITAL ENCOUNTER (OUTPATIENT)
Dept: LAB | Facility: MEDICAL CENTER | Age: 55
End: 2020-12-28
Attending: FAMILY MEDICINE
Payer: COMMERCIAL

## 2020-12-28 ENCOUNTER — HOSPITAL ENCOUNTER (OUTPATIENT)
Dept: LAB | Facility: MEDICAL CENTER | Age: 55
End: 2020-12-28
Attending: NURSE PRACTITIONER
Payer: COMMERCIAL

## 2020-12-28 DIAGNOSIS — E55.9 VITAMIN D DEFICIENCY: ICD-10-CM

## 2020-12-28 DIAGNOSIS — R74.8 ELEVATED LIVER ENZYMES: ICD-10-CM

## 2020-12-28 DIAGNOSIS — E78.5 DYSLIPIDEMIA: ICD-10-CM

## 2020-12-28 DIAGNOSIS — E78.00 PRIMARY HYPERCHOLESTEROLEMIA: ICD-10-CM

## 2020-12-28 DIAGNOSIS — E03.9 ACQUIRED HYPOTHYROIDISM: ICD-10-CM

## 2020-12-28 LAB
25(OH)D3 SERPL-MCNC: 25 NG/ML (ref 30–100)
ALBUMIN SERPL BCP-MCNC: 4.5 G/DL (ref 3.2–4.9)
ALBUMIN SERPL BCP-MCNC: 4.7 G/DL (ref 3.2–4.9)
ALBUMIN/GLOB SERPL: 1.4 G/DL
ALBUMIN/GLOB SERPL: 1.5 G/DL
ALP SERPL-CCNC: 71 U/L (ref 30–99)
ALP SERPL-CCNC: 75 U/L (ref 30–99)
ALT SERPL-CCNC: 35 U/L (ref 2–50)
ALT SERPL-CCNC: 37 U/L (ref 2–50)
ANION GAP SERPL CALC-SCNC: 11 MMOL/L (ref 7–16)
ANION GAP SERPL CALC-SCNC: 12 MMOL/L (ref 7–16)
AST SERPL-CCNC: 29 U/L (ref 12–45)
AST SERPL-CCNC: 30 U/L (ref 12–45)
BILIRUB SERPL-MCNC: 0.5 MG/DL (ref 0.1–1.5)
BILIRUB SERPL-MCNC: 0.5 MG/DL (ref 0.1–1.5)
BUN SERPL-MCNC: 18 MG/DL (ref 8–22)
BUN SERPL-MCNC: 18 MG/DL (ref 8–22)
CALCIUM SERPL-MCNC: 9.3 MG/DL (ref 8.5–10.5)
CALCIUM SERPL-MCNC: 9.4 MG/DL (ref 8.5–10.5)
CHLORIDE SERPL-SCNC: 100 MMOL/L (ref 96–112)
CHLORIDE SERPL-SCNC: 103 MMOL/L (ref 96–112)
CHOLEST SERPL-MCNC: 273 MG/DL (ref 100–199)
CK SERPL-CCNC: 184 U/L (ref 0–154)
CO2 SERPL-SCNC: 23 MMOL/L (ref 20–33)
CO2 SERPL-SCNC: 23 MMOL/L (ref 20–33)
CREAT SERPL-MCNC: 0.51 MG/DL (ref 0.5–1.4)
CREAT SERPL-MCNC: 0.51 MG/DL (ref 0.5–1.4)
CRP SERPL HS-MCNC: 1.2 MG/L (ref 0–7.5)
FASTING STATUS PATIENT QL REPORTED: NORMAL
GLOBULIN SER CALC-MCNC: 3.1 G/DL (ref 1.9–3.5)
GLOBULIN SER CALC-MCNC: 3.3 G/DL (ref 1.9–3.5)
GLUCOSE SERPL-MCNC: 95 MG/DL (ref 65–99)
GLUCOSE SERPL-MCNC: 98 MG/DL (ref 65–99)
HDLC SERPL-MCNC: 72 MG/DL
LDLC SERPL CALC-MCNC: 161 MG/DL
POTASSIUM SERPL-SCNC: 4 MMOL/L (ref 3.6–5.5)
POTASSIUM SERPL-SCNC: 4 MMOL/L (ref 3.6–5.5)
PROT SERPL-MCNC: 7.8 G/DL (ref 6–8.2)
PROT SERPL-MCNC: 7.8 G/DL (ref 6–8.2)
SODIUM SERPL-SCNC: 135 MMOL/L (ref 135–145)
SODIUM SERPL-SCNC: 137 MMOL/L (ref 135–145)
TRIGL SERPL-MCNC: 201 MG/DL (ref 0–149)
TSH SERPL DL<=0.005 MIU/L-ACNC: 3.64 UIU/ML (ref 0.38–5.33)
TSH SERPL DL<=0.005 MIU/L-ACNC: 3.64 UIU/ML (ref 0.38–5.33)

## 2020-12-28 PROCEDURE — 36415 COLL VENOUS BLD VENIPUNCTURE: CPT

## 2020-12-28 PROCEDURE — 82172 ASSAY OF APOLIPOPROTEIN: CPT

## 2020-12-28 PROCEDURE — 80061 LIPID PANEL: CPT

## 2020-12-28 PROCEDURE — 83704 LIPOPROTEIN BLD QUAN PART: CPT

## 2020-12-28 PROCEDURE — 84443 ASSAY THYROID STIM HORMONE: CPT

## 2020-12-28 PROCEDURE — 80053 COMPREHEN METABOLIC PANEL: CPT

## 2020-12-28 PROCEDURE — 86141 C-REACTIVE PROTEIN HS: CPT

## 2020-12-28 PROCEDURE — 80053 COMPREHEN METABOLIC PANEL: CPT | Mod: 91

## 2020-12-28 PROCEDURE — 84443 ASSAY THYROID STIM HORMONE: CPT | Mod: 91

## 2020-12-28 PROCEDURE — 82550 ASSAY OF CK (CPK): CPT

## 2020-12-28 PROCEDURE — 83695 ASSAY OF LIPOPROTEIN(A): CPT

## 2020-12-28 PROCEDURE — 83721 ASSAY OF BLOOD LIPOPROTEIN: CPT

## 2020-12-28 PROCEDURE — 82306 VITAMIN D 25 HYDROXY: CPT

## 2020-12-30 PROBLEM — E78.41 ELEVATED LIPOPROTEIN(A): Status: ACTIVE | Noted: 2020-12-30

## 2020-12-30 PROBLEM — E78.00 PRIMARY HYPERCHOLESTEROLEMIA: Status: ACTIVE | Noted: 2020-12-30

## 2020-12-30 LAB
LDLC SERPL-MCNC: 179 MG/DL (ref 0–129)
LPA SERPL-MCNC: 148 MG/DL

## 2021-01-01 LAB
CHOLEST SERPL-MCNC: 292 MG/DL
HDL PARTICAL NO Q4363: >41 UMOL/L
HDL SIZE Q4361: 8.8 NM
HDLC SERPL-MCNC: 73 MG/DL (ref 40–59)
HLD.LARGE SERPL-SCNC: 6.1 UMOL/L
L VLDL PART NO Q4357: 9.8 NMOL/L
LDL SERPL QN: 20.9 NM
LDL SERPL-SCNC: 2138 NMOL/L
LDL SMALL SERPL-SCNC: 995 NMOL/L
LDLC SERPL CALC-MCNC: 177 MG/DL
PATHOLOGY STUDY: ABNORMAL
TRIGL SERPL-MCNC: 209 MG/DL (ref 30–149)
VLDL SIZE Q4362: 52.9 NM

## 2021-01-04 LAB — APO B100 SERPL-MCNC: 152 MG/DL (ref 55–125)

## 2021-04-29 ENCOUNTER — TELEPHONE (OUTPATIENT)
Dept: VASCULAR LAB | Facility: MEDICAL CENTER | Age: 56
End: 2021-04-29

## 2021-04-29 NOTE — TELEPHONE ENCOUNTER
Pt LVM needing to cancel and reschedule 04/30 appt w/ Dr. Martin. I LVM for pt to call back so we could reschedule appt.

## 2021-04-30 ENCOUNTER — APPOINTMENT (OUTPATIENT)
Dept: VASCULAR LAB | Facility: MEDICAL CENTER | Age: 56
End: 2021-04-30
Attending: FAMILY MEDICINE
Payer: COMMERCIAL

## 2021-04-30 ASSESSMENT — ENCOUNTER SYMPTOMS
DEPRESSION: 0
FOCAL WEAKNESS: 0
ORTHOPNEA: 0
BRUISES/BLEEDS EASILY: 0
MYALGIAS: 0
VOMITING: 0
SEIZURES: 0
COUGH: 0
DIARRHEA: 0
WEAKNESS: 0
NAUSEA: 0
BLURRED VISION: 0
SORE THROAT: 0
TREMORS: 0
NERVOUS/ANXIOUS: 0
HEMOPTYSIS: 0
HEADACHES: 0
PALPITATIONS: 0
WHEEZING: 0
CHILLS: 0
BLOOD IN STOOL: 0
DIZZINESS: 0
FEVER: 0
DOUBLE VISION: 0
INSOMNIA: 0
SHORTNESS OF BREATH: 0
ABDOMINAL PAIN: 0

## 2021-04-30 NOTE — PROGRESS NOTES
[facility] - Follow-up  Date of Service: ***    Kev Garcia has been referred for evaluation and management of dyslipidemia    Referral Source: Holly Tineo    HPI  Interval hx/concerns: had total vasc screen with [facility], but has not done labs yet. Has not implemented any new TLC.        History of ASCVD: No  Other Established (non-atherosclerotic) Vascular Disease, if Present: None  Age at Initial Diagnosis of Dyslipidemia: many years     Current Prescription Lipid Lowering Medications - including dose:   Statin: None  Non-Statin: None  Current Lipid Lowering and Related Supplements:   None  Any Current Side Effects Potentially Related to Lipid Lowering therapy?   No  Current Adherence to Lipid Lowering Therapies   Complete  Previously Attempted Interventions for Lipids - including outcome  Statin: None     Outcome: N/A  Non-Statin: None    Outcome: N/A  Any Previous History of Statin Intolerance?   No  Baseline Lipids Prior to Treatment:      Ref. Range 12/9/2019 08:51   Cholesterol,Tot Latest Ref Range: 100 - 199 mg/dL 295 (H)   Triglycerides Latest Ref Range: 0 - 149 mg/dL 166 (H)   HDL Latest Ref Range: >=40 mg/dL 71   LDL Latest Ref Range: <100 mg/dL 191 (H)     Other Pertinent History:     Anticoagulation/antiplats:  no    History of other CV risk factors:    Had prior duplex with left carotid art stenosis, CTA neck was normal in 2016    HTN:  No prior HTN or antiHTN meds.     Other:    Hypothyroidism:  Stable, no issues. Tolerating meds.  Last TSH normal     Liver disease:   Stable, pending labs.   Noted to have hepatic steatosis in the past.  Hx of HBV+, most recent labs showed HBV core Ab positive but HBsAg negative, indicating likely recurrent, active disease w/o immunity.   Had elevated ALT, AST w/o elevated T bili.  Remainder of w/u neg.  No prior statin-induced acute liver injury   Seeing GI     CURRENT MEDICATIONS:   Current Outpatient Medications:   •  levothyroxine, Take one tab  daily 1/2 hour prior to breakfast on an empty stomach.  •  sertraline, 100 mg, Oral, DAILY  •  CALCIUM PO, Take  by mouth.  •  MILK THISTLE PO, Take  by mouth.  •  Cyanocobalamin (B-12 PO), Take  by mouth.  •  TURMERIC PO, Take  by mouth. Takes in liquid form  •  Coenzyme Q10 (COQ10 PO), Take  by mouth. Taking in liquid form  •  NATTOKINASE PO, Take  by mouth.  •  Vitamin D-3, Take  by mouth.  •  albuterol, 1-2 Puff, Inhalation, Q4HRS PRN  •  Symbicort, INHALE TWO PUFFS BY MOUTH TWICE A DAY. RINSE MOUTH AFTER EACH USE  •  ipratropium-albuterol, 3 mL, Nebulization, 4XDAY    ALLERGIES: Patient has no known allergies.    SOCIAL HISTORY   Social History     Tobacco Use   • Smoking status: Never Smoker   • Smokeless tobacco: Never Used   Substance Use Topics   • Alcohol use: Yes     Alcohol/week: 0.6 oz     Types: 1 Shots of liquor per week     Comment: every few days   • Drug use: No     Change in weight: Stable  BMI Readings from Last 5 Encounters:   10/29/20 27.14 kg/m²   07/24/20 26.89 kg/m²   06/10/20 26.52 kg/m²   12/10/19 27.80 kg/m²   08/07/19 27.80 kg/m²   ]  Exercise habits: minimal exercise  Diet: common adult, low fat    Review of Systems   Constitutional: Negative for chills, fever and malaise/fatigue.   HENT: Negative for nosebleeds, sore throat and tinnitus.    Eyes: Negative for blurred vision and double vision.   Respiratory: Negative for cough, hemoptysis, shortness of breath and wheezing.    Cardiovascular: Negative for chest pain, palpitations, orthopnea and leg swelling.   Gastrointestinal: Negative for abdominal pain, blood in stool, diarrhea, melena, nausea and vomiting.   Genitourinary: Negative for hematuria.   Musculoskeletal: Negative for joint pain and myalgias.   Skin: Negative for itching and rash.   Neurological: Negative for dizziness, tremors, focal weakness, seizures, weakness and headaches.   Endo/Heme/Allergies: Does not bruise/bleed easily.   Psychiatric/Behavioral: Negative for  depression. The patient is not nervous/anxious and does not have insomnia.      There were no vitals filed for this visit.   BP Readings from Last 5 Encounters:   10/29/20 118/68   07/24/20 119/78   12/10/19 120/80   08/07/19 120/64   04/08/19 108/70   ]  Physical Exam   Constitutional: She is oriented to person, place, and time. She appears well-developed and well-nourished. No distress.   HENT:   Head: Normocephalic and atraumatic.   Neck: No JVD present. No thyromegaly present.   Cardiovascular: Normal rate, regular rhythm, normal heart sounds and intact distal pulses. Exam reveals no gallop and no friction rub.   No murmur heard.  Pulses:       Carotid pulses are 2+ on the right side and 2+ on the left side.       Radial pulses are 2+ on the right side and 2+ on the left side.        Dorsalis pedis pulses are 2+ on the right side and 2+ on the left side.        Posterior tibial pulses are 2+ on the right side and 2+ on the left side.   Edema     RLE: none     LLE: none   Spider telangectasia:       RLE:  None      LLE: none   Varicosities:         RLE: none      LLE: none   Corona phlebectatica:      RLE:  None       LLE:  None   Cording:         RLE:  None     LLE: None      Pulmonary/Chest: Effort normal and breath sounds normal.   Abdominal: Soft. Bowel sounds are normal. She exhibits no distension and no mass. There is no abdominal tenderness. There is no rebound and no guarding.   Musculoskeletal:         General: No tenderness. Normal range of motion.      Cervical back: Normal range of motion and neck supple.   Neurological: She is alert and oriented to person, place, and time. No cranial nerve deficit. She exhibits normal muscle tone. Coordination normal.   Skin: Skin is warm and dry. She is not diaphoretic.   Psychiatric: She has a normal mood and affect.     DATA REVIEW:  Most Recent Lipid Panel:   Lab Results   Component Value Date    CHOLSTRLTOT 292 (H) 12/28/2020    CHOLSTRLTOT 273 (H) 12/28/2020     TRIGLYCERIDE 209 (H) 12/28/2020    TRIGLYCERIDE 201 (H) 12/28/2020    HDL 73 (H) 12/28/2020    HDL 72 12/28/2020     (H) 12/28/2020     Other Pertinent Blood Work:   Lab Results   Component Value Date    SODIUM 135 12/28/2020    POTASSIUM 4.0 12/28/2020    CHLORIDE 100 12/28/2020    CO2 23 12/28/2020    ANION 12.0 12/28/2020    GLUCOSE 95 12/28/2020    BUN 18 12/28/2020    CREATININE 0.51 12/28/2020    CALCIUM 9.3 12/28/2020    ASTSGOT 30 12/28/2020    ALTSGPT 37 12/28/2020    ALKPHOSPHAT 71 12/28/2020    TBILIRUBIN 0.5 12/28/2020    ALBUMIN 4.7 12/28/2020    AGRATIO 1.5 12/28/2020    LIPOPROTA 148 (H) 12/28/2020    TSHULTRASEN 3.640 12/28/2020    CPKTOTAL 184 (H) 12/28/2020        Ref. Range 7/17/2019 07:04   25-Hydroxy   Vitamin D 25 Latest Ref Range: 30 - 100 ng/mL 18 (L)      Ref. Range 3/2/2020 06:16   AST(SGOT) Latest Ref Range: 12 - 45 U/L 41   ALT(SGPT) Latest Ref Range: 2 - 50 U/L 39   Alkaline Phosphatase Latest Ref Range: 30 - 99 U/L 72   Total Bilirubin Latest Ref Range: 0.1 - 1.5 mg/dL 0.4   Direct Bilirubin Latest Ref Range: 0.1 - 0.5 mg/dL <0.1   Indirect Bilirubin Latest Ref Range: 0.0 - 1.0 mg/dL see below   Albumin Latest Ref Range: 3.2 - 4.9 g/dL 4.5   Total Protein Latest Ref Range: 6.0 - 8.2 g/dL 8.1   Iron Latest Ref Range: 40 - 170 ug/dL 80   Total Iron Binding Latest Ref Range: 250 - 450 ug/dL 435   % Saturation Latest Ref Range: 15 - 55 % 18        Ref. Range 3/2/2020 06:16   AFP L3% Latest Ref Range: 0.0 - 9.9 % <0.5   Alpha Fetoprotein Latest Ref Range: 0 - 15 ng/mL 2   Ferritin Latest Ref Range: 10.0 - 291.0 ng/mL 108.2   Hep A Virus Antibody Total Latest Ref Range: Negative  Negative   Hep B Surface Antibody Quant Latest Ref Range: 0.00 - 10.00 mIU/mL <3.10   Hepatitis B Surface Antigen Latest Ref Range: Negative  Negative   Hepatitis B Core Ab, Total Latest Ref Range: Negative  Reactive (A)   Hepatitis C Antibody Latest Ref Range: Negative  Negative   F-Actin Ab, IgG Latest Ref  Range: 0 - 19 Units 4   Antinuclear Antibody Latest Ref Range: None Detected  None Detected     Recent Imaging Studies:    VASCULAR IMAGING:     Last EKG:   Results for orders placed or performed during the hospital encounter of 16   EKG (NOW)   Result Value Ref Range    Report       [facility] Emergency Dept.    Test Date:  2016  Pt Name:    ZEINAB CHRISTIAN                  Department: ER  MRN:        [id number]                      Room:       [facility]  Gender:     F                            Technician: [id number]  :        1965                   Requested By:BRYANNA CONTRERAS  Order #:    [id number]                    Reading MD: BRYANNA CONTRERAS MD    Measurements  Intervals                                Axis  Rate:       58                           P:          34  WY:         156                          QRS:        47  QRSD:       82                           T:          33  QT:         476  QTc:        468    Interpretive Statements  SINUS BRADYCARDIA  No previous ECG available for comparison    Electronically Signed On 2017 11:12:53 PST by BRYANNA CONTRERAS MD       US abd 3/8/20  No sonographic evidence of cirrhosis, portal hypertension or other worrisome finding    Carotid   1.  There is no visible atherosclerotic plaque.   2.  There is no evidence of carotid occlusion.   3. Diameter reduction in the internal carotid arteries is estimated at 50-70% on the left and less than 50% on the right.   4. Vertebral arteries demonstrate antegrade flow.     CTA head/neck    Normal   1. No evidence of flow-limiting stenosis in the cervical carotid or cervical vertebral arteries.    CT abd/pelvis 3/2019  There is mild scattered arterial calcification. The abdominal aorta is ectatic.  1.  No acute intra-abdominal findings.   2.  Hepatic steatosis.   3.  Atherosclerosis.   4.  2.3 cm hypodense mass in the right ovary likely represents a cyst and is likely benign. Assuming  postmenopausal state, follow-up ultrasound is recommended in 6-12 months    Total vasc screen with [facility] 9/4/20   ABDOMINAL AORTA SCAN:   No aneurysm of the abdominal aorta.   No plaque observed in the abdominal aorta.   The maximum diameter of the aorta is 1.8 cm.    Doppler velocity is normal at 117 cm/s.      ANKLE-BRACHIAL INDEX (STANFORD):   All waveforms are brisk and triphasic.   Right STANFORD is 1.27   Left STANFORD is 1.21   STANFORD's are normal.      CONCLUSIONS   Mild plaque in the right carotid artery with less than 50% stenosis of the    internal carotid.    Normal screening exam of the left carotid artery.   No aneurysm of the abdominal aorta.    Normal bilateral STANFORD's.     Average vascular age is 55 years as determined by measurement of the   bilateral    carotid intima-medial thickness.   3% risk for a cardiovascular event in the next 10 years according to the    Shorter cardiovascular disease risk model.    ASSESSMENT AND PLAN  No diagnosis found.  Patient Type, check all that apply:   Primary Prevention    Established Atherosclerotic Cardiovascular Disease (ASCVD)  No    Other Established (non-atherosclerotic) Vascular Disease, if Present:  None    Evidence of Heterozygous Familial Hypercholesterolemia (FH):   Possible (LDL >190, mother with severe HLD)  Though mother has severe HLD, no early fhx of ascvd events.      ACC/AHA Indication for Statin Therapy, zelda all that apply:  LDL-C at baseline >190 mg/dl: Indication for Moderate intensity statin   - Prior LDL (not on meds) was in the 110-120s range     Calculated Risk for ASCVD, if applicable    N/A    Other Significant Risk Markers, if any, zelda all that apply   Total vasc screen, [facility]:  Normal, vasc age 53, 3% 10-yr risk     Goal LDL-C and nonHDL-C based on Clinic Protocol  LDL-C:   <100 mg/dL - not at goal     Lifestyle Recommendations From Today’s Visit:      Smoking:  reports that she has never smoked. She has never used smokeless tobacco.   - continued  complete avoidance of all tobacco products     Physical Activity: continue healthy activity to improve CV fitness, see care instructions for additional details     Weight Management and Nutrition: Dietary plan was discussed with patient at this visit including DASH, low sodium and/or as outlined in care instructions   Eating Plan: Concentrate on  Low sat/Trans fat, Low simple carb, Calorie restricted and DASH/Med Style diet    Statin Therapy Recommendations from Today’s Visit:   Reviewed labs in context of total vasc screen and through shared decision-making she would like to start TLC and supplements and consider statin therapy in the future.   She is aware she will likely need statin.    - current declines statin therapy   - would recommend initiation with rosuva 5-10mg m/w/f and monitor liver enzymes   - NLA consensus statement (2014) reports liver enzyme elevation does not contraindicate use of statin    Non-Statin Medications Recommendations from Today’s Visit:   - could consider zetia as primary alternative to statin, though statin preferred     Indication for PCSK9 Inhibitor, if applicable:  Not currently indicated    Supplements Recommended at this visit:   - start vit D3 5000 units daily  - start cholest-off   - trial of berberine, bergamot, or artichoke leaf extract      Antithrombotic therapy:  Not indicated     Recommendations for Other Cardiovascular Risk Factors, zelda all that apply:     1) Blood Pressure Management:Goal: ACC/AHA (2017) goal <130/80   Home BP at goal: yes   Office BP at goal:  yes  Echo: n/a  UACR: n/a   Plan:   Monitoring:   - start/continue home BP monitoring, reviewed correct technique:  Yes   - order 24h ABPM:  NO  Medications: no meds indicated at this time     2) Glycemic Status: Normal    Other Issues:  1) liver enzyme elevations, resolved.    Hx of possible recurrent HBV based upon HBV testing panel   Does not preclude use of statin therapy for HLD   - will monitor liver  closely if statin therapy initiated and with each titration step     Studies Ordered at Todays Visit: none   Blood Work Ordered At Today’s visit: as noted above   Follow-Up: 6mo     Total 25 minutes face-to-face time spent with patient, with greater than 50% of the total time counseling on risk/benefits of therapy, anticipated natural hx of ascvd in  Context of severe HLD.  TLC, supplements       Ko Martin M.D.    CC:  NELL Zavala

## 2021-05-06 ENCOUNTER — TELEPHONE (OUTPATIENT)
Dept: VASCULAR LAB | Facility: MEDICAL CENTER | Age: 56
End: 2021-05-06

## 2021-05-06 NOTE — TELEPHONE ENCOUNTER
"LVM informing pt she doesn't need to get any labs done prior to appt since she's already completed what was required.    ----- Message from Kofi Fraire Ass't sent at 5/6/2021  8:32 AM PDT -----  Regarding: RE: Lab Orders  It looks like she already did the blood work he ordered. She should be fine  ----- Message -----  From: Isabel eLmus  Sent: 5/5/2021   1:50 PM PDT  To: [facility]  Subject: Lab Orders                                       Hi,    I just scheduled an appt for pt to see Dr. Martin in June. She asked me if she needed to get labs done prior to the appt. The appointment notes say \"f/u HLD, labs\". I don't see current labs ordered. Let me know and I can call the pt back.    Thanks,     Isabel        "

## 2021-06-18 ENCOUNTER — PATIENT MESSAGE (OUTPATIENT)
Dept: MEDICAL GROUP | Facility: PHYSICIAN GROUP | Age: 56
End: 2021-06-18

## 2021-06-18 ENCOUNTER — OFFICE VISIT (OUTPATIENT)
Dept: VASCULAR LAB | Facility: MEDICAL CENTER | Age: 56
End: 2021-06-18
Attending: FAMILY MEDICINE
Payer: COMMERCIAL

## 2021-06-18 DIAGNOSIS — M79.673 HEEL PAIN, UNSPECIFIED LATERALITY: ICD-10-CM

## 2021-06-18 DIAGNOSIS — K76.0 HEPATIC STEATOSIS: ICD-10-CM

## 2021-06-18 DIAGNOSIS — E78.41 ELEVATED LIPOPROTEIN(A): ICD-10-CM

## 2021-06-18 DIAGNOSIS — E03.9 ACQUIRED HYPOTHYROIDISM: ICD-10-CM

## 2021-06-18 DIAGNOSIS — R74.8 ELEVATED LIVER ENZYMES: ICD-10-CM

## 2021-06-18 DIAGNOSIS — E55.9 VITAMIN D DEFICIENCY: ICD-10-CM

## 2021-06-18 DIAGNOSIS — E78.00 PRIMARY HYPERCHOLESTEROLEMIA: ICD-10-CM

## 2021-06-18 DIAGNOSIS — Z91.89 OTHER SPECIFIED PERSONAL RISK FACTORS, NOT ELSEWHERE CLASSIFIED: ICD-10-CM

## 2021-06-18 PROCEDURE — 99214 OFFICE O/P EST MOD 30 MIN: CPT | Mod: 95,CR | Performed by: FAMILY MEDICINE

## 2021-06-18 RX ORDER — ROSUVASTATIN CALCIUM 5 MG/1
5 TABLET, COATED ORAL EVERY EVENING
Qty: 90 TABLET | Refills: 3 | Status: SHIPPED | OUTPATIENT
Start: 2021-06-18 | End: 2022-06-13

## 2021-06-18 ASSESSMENT — ENCOUNTER SYMPTOMS
CHILLS: 0
FEVER: 0
ORTHOPNEA: 0
COUGH: 0
BRUISES/BLEEDS EASILY: 0
MYALGIAS: 0
NAUSEA: 0
PALPITATIONS: 0
WEAKNESS: 0
CLAUDICATION: 0

## 2021-06-18 NOTE — PROGRESS NOTES
This visit was conducted via [employer] video call using secure and encrypted video conferencing technology due to covid-19 restrictions.   The patient was in a private location in the state of [place].    The patient's identity was confirmed and verbal consent was obtained for this virtual visit.     [facility] - Follow-up  Date of Service:  06/18/21     Kev Garcia has been referred for evaluation and management of dyslipidemia    Referral Source: Holly Tineo    HPI  Interval hx/concerns: none but concerned about ongoing high cholesterol.  Last labs 12/2020       History of ASCVD: No  Other Established (non-atherosclerotic) Vascular Disease, if Present: None  Age at Initial Diagnosis of Dyslipidemia: many years     Current Prescription Lipid Lowering Medications - including dose:   Statin: None  Non-Statin: None  Current Lipid Lowering and Related Supplements:   None  Any Current Side Effects Potentially Related to Lipid Lowering therapy?   No  Current Adherence to Lipid Lowering Therapies   Complete  Previously Attempted Interventions for Lipids - including outcome  Statin: None     Outcome: N/A  Non-Statin: None    Outcome: N/A  Any Previous History of Statin Intolerance? No  Baseline Lipids Prior to Treatment:      Ref. Range 12/9/2019 08:51   Cholesterol,Tot Latest Ref Range: 100 - 199 mg/dL 295 (H)   Triglycerides Latest Ref Range: 0 - 149 mg/dL 166 (H)   HDL Latest Ref Range: >=40 mg/dL 71   LDL Latest Ref Range: <100 mg/dL 191 (H)     Other Pertinent History:     Anticoagulation/antiplats:  no    History of other CV risk factors:    Had prior duplex with left carotid art stenosis, CTA neck was normal in 2016    HTN:  No prior HTN or antiHTN meds.     Other:    Hypothyroidism:  Stable, no issues. Tolerating meds.  Last TSH normal     Liver disease:   No interval changes.  Had labs 12/2020 - resolved   Noted to have hepatic steatosis in the past.  Hx of HBV+, most recent labs showed HBV  core Ab positive but HBsAg negative, indicating likely recurrent, active disease w/o immunity.   Had elevated ALT, AST w/o elevated T bili.  Remainder of w/u neg.  No prior statin-induced acute liver injury   Seeing GI     CURRENT MEDICATIONS:   Current Outpatient Medications:   •  levothyroxine, Take one tab daily 1/2 hour prior to breakfast on an empty stomach.  •  sertraline, 100 mg, Oral, DAILY  •  CALCIUM PO, Take  by mouth.  •  MILK THISTLE PO, Take  by mouth.  •  Cyanocobalamin (B-12 PO), Take  by mouth.  •  TURMERIC PO, Take  by mouth. Takes in liquid form  •  Coenzyme Q10 (COQ10 PO), Take  by mouth. Taking in liquid form  •  NATTOKINASE PO, Take  by mouth.  •  Vitamin D-3, Take  by mouth.  •  albuterol, 1-2 Puff, Inhalation, Q4HRS PRN  •  Symbicort, INHALE TWO PUFFS BY MOUTH TWICE A DAY. RINSE MOUTH AFTER EACH USE  •  ipratropium-albuterol, 3 mL, Nebulization, 4XDAY    ALLERGIES: Patient has no known allergies.    SOCIAL HISTORY   Social History     Tobacco Use   • Smoking status: Never Smoker   • Smokeless tobacco: Never Used   Substance Use Topics   • Alcohol use: Yes     Alcohol/week: 0.6 oz     Types: 1 Shots of liquor per week     Comment: every few days   • Drug use: No     Change in weight: Stable  BMI Readings from Last 5 Encounters:   10/29/20 27.14 kg/m²   07/24/20 26.89 kg/m²   06/10/20 26.52 kg/m²   12/10/19 27.80 kg/m²   08/07/19 27.80 kg/m²     Exercise habits: minimal exercise  Diet: common adult, low fat    Review of Systems   Constitutional: Negative for chills and fever.   Respiratory: Negative for cough.    Cardiovascular: Negative for chest pain, palpitations, orthopnea, claudication and leg swelling.   Gastrointestinal: Negative for nausea.   Musculoskeletal: Negative for myalgias.   Neurological: Negative for weakness.   Endo/Heme/Allergies: Does not bruise/bleed easily.     There were no vitals filed for this visit.   BP Readings from Last 5 Encounters:   10/29/20 118/68   07/24/20  119/78   12/10/19 120/80   08/07/19 120/64   04/08/19 108/70   ]  Physical Exam  Constitutional:       General: She is not in acute distress.     Appearance: She is well-developed. She is not diaphoretic.   HENT:      Head: Normocephalic.   Eyes:      Conjunctiva/sclera: Conjunctivae normal.   Pulmonary:      Effort: Pulmonary effort is normal. No respiratory distress.   Skin:     Coloration: Skin is not pale.      Findings: No rash.   Neurological:      Mental Status: She is alert and oriented to person, place, and time.      Cranial Nerves: No cranial nerve deficit.   Psychiatric:         Behavior: Behavior normal.       DATA REVIEW:  Most Recent Lipid Panel:   Lab Results   Component Value Date    CHOLSTRLTOT 292 (H) 12/28/2020    CHOLSTRLTOT 273 (H) 12/28/2020    TRIGLYCERIDE 209 (H) 12/28/2020    TRIGLYCERIDE 201 (H) 12/28/2020    HDL 73 (H) 12/28/2020    HDL 72 12/28/2020     (H) 12/28/2020      Ref. Range 12/28/2020 07:25   Cholesterol,Tot Latest Ref Range: <=199 mg/dL 292 (H)   Triglycerides Latest Ref Range: 30 - 149 mg/dL 209 (H)   HDL Latest Ref Range: 40 - 59 mg/dL 73 (H)   EER LipoFit by NMR Unknown See Note   LDL Direct Latest Ref Range: 0 - 129 mg/dL 179 (H)   LDL Cholesterol Latest Ref Range: <=129 mg/dL 177 (H)   HDL Particle No. Latest Ref Range: >=33.0 umol/L >41.0   Small LDL Latest Ref Range: <=634 nmol/L 995 (H)   L-HDL Particle No. Latest Ref Range: >=4.2 umol/L 6.1   VLDL Size Latest Ref Range: <=46.7 nm 52.9 (H)   L-VLDL Particle No. Latest Ref Range: <=2.7 nmol/L 9.8 (H)   HDL Size Latest Ref Range: >=8.9 nm 8.8 (L)   LDL Particle Size Latest Ref Range: >=20.7 nm 20.9   LDL Particle Latest Ref Range: <=1135 nmol/L 2138 (H)      Ref. Range 12/28/2020 07:25   Apolipoprotein-B Latest Ref Range: 55 - 125 mg/dL 152 (H)   Lipoprotein A Latest Ref Range: <=29 mg/dL 148 (H)     Other Pertinent Blood Work:   Lab Results   Component Value Date    SODIUM 135 12/28/2020    POTASSIUM 4.0  12/28/2020    CHLORIDE 100 12/28/2020    CO2 23 12/28/2020    ANION 12.0 12/28/2020    GLUCOSE 95 12/28/2020    BUN 18 12/28/2020    CREATININE 0.51 12/28/2020    CALCIUM 9.3 12/28/2020    ASTSGOT 30 12/28/2020    ALTSGPT 37 12/28/2020    ALKPHOSPHAT 71 12/28/2020    TBILIRUBIN 0.5 12/28/2020    ALBUMIN 4.7 12/28/2020    AGRATIO 1.5 12/28/2020    LIPOPROTA 148 (H) 12/28/2020    TSHULTRASEN 3.640 12/28/2020    CPKTOTAL 184 (H) 12/28/2020      Ref. Range 12/28/2020 07:25   25-Hydroxy   Vitamin D 25 Latest Ref Range: 30 - 100 ng/mL 25 (L)      Ref. Range 12/28/2020 07:25   CPK Total Latest Ref Range: 0 - 154 U/L 184 (H)      Ref. Range 7/17/2019 07:04   25-Hydroxy   Vitamin D 25 Latest Ref Range: 30 - 100 ng/mL 18 (L)      Ref. Range 3/2/2020 06:16   AST(SGOT) Latest Ref Range: 12 - 45 U/L 41   ALT(SGPT) Latest Ref Range: 2 - 50 U/L 39   Alkaline Phosphatase Latest Ref Range: 30 - 99 U/L 72   Total Bilirubin Latest Ref Range: 0.1 - 1.5 mg/dL 0.4   Direct Bilirubin Latest Ref Range: 0.1 - 0.5 mg/dL <0.1   Indirect Bilirubin Latest Ref Range: 0.0 - 1.0 mg/dL see below   Albumin Latest Ref Range: 3.2 - 4.9 g/dL 4.5   Total Protein Latest Ref Range: 6.0 - 8.2 g/dL 8.1   Iron Latest Ref Range: 40 - 170 ug/dL 80   Total Iron Binding Latest Ref Range: 250 - 450 ug/dL 435   % Saturation Latest Ref Range: 15 - 55 % 18      Ref. Range 3/2/2020 06:16   AFP L3% Latest Ref Range: 0.0 - 9.9 % <0.5   Alpha Fetoprotein Latest Ref Range: 0 - 15 ng/mL 2   Ferritin Latest Ref Range: 10.0 - 291.0 ng/mL 108.2   Hep A Virus Antibody Total Latest Ref Range: Negative  Negative   Hep B Surface Antibody Quant Latest Ref Range: 0.00 - 10.00 mIU/mL <3.10   Hepatitis B Surface Antigen Latest Ref Range: Negative  Negative   Hepatitis B Core Ab, Total Latest Ref Range: Negative  Reactive (A)   Hepatitis C Antibody Latest Ref Range: Negative  Negative   F-Actin Ab, IgG Latest Ref Range: 0 - 19 Units 4   Antinuclear Antibody Latest Ref Range: None  Detected  None Detected     VASCULAR IMAGING:     Last EKG:   Results for orders placed or performed during the hospital encounter of 16   EKG (NOW)   Result Value Ref Range    Report       [facility]    Test Date:  2016  Pt Name:    ZEINAB CHRISTIAN                  Department: ER  MRN:        [id number]                      Room:       [facility]  Gender:     F                            Technician: [id number]  :        1965                   Requested By:BRYANNA CONTRERAS  Order #:    [id number]                    Reading MD: BRYANNA CONTRERAS MD    Measurements  Intervals                                Axis  Rate:       58                           P:          34  MI:         156                          QRS:        47  QRSD:       82                           T:          33  QT:         476  QTc:        468    Interpretive Statements  SINUS BRADYCARDIA  No previous ECG available for comparison    Electronically Signed On 2017 11:12:53 PST by BRYANNA CONTRERAS MD       US abd 3/8/20  No sonographic evidence of cirrhosis, portal hypertension or other worrisome finding    Carotid 2015  1.  There is no visible atherosclerotic plaque.   2.  There is no evidence of carotid occlusion.   3. Diameter reduction in the internal carotid arteries is estimated at 50-70% on the left and less than 50% on the right.   4. Vertebral arteries demonstrate antegrade flow.     CTA head/neck 2016   Normal   1. No evidence of flow-limiting stenosis in the cervical carotid or cervical vertebral arteries.    CT abd/pelvis 3/2019  There is mild scattered arterial calcification. The abdominal aorta is ectatic.  1.  No acute intra-abdominal findings.   2.  Hepatic steatosis.   3.  Atherosclerosis.   4.  2.3 cm hypodense mass in the right ovary likely represents a cyst and is likely benign. Assuming postmenopausal state, follow-up ultrasound is recommended in 6-12 months    Total vasc screen with  CIMT 9/4/20    ABDOMINAL AORTA SCAN:   No aneurysm of the abdominal aorta.   No plaque observed in the abdominal aorta.   The maximum diameter of the aorta is 1.8 cm.    Doppler velocity is normal at 117 cm/s.     ANKLE-BRACHIAL INDEX (STANFORD):   All waveforms are brisk and triphasic.   Right STANFORD is 1.27   Left STANFORD is 1.21   STANFORD's are normal.     CONCLUSIONS   Mild plaque in the right carotid artery with less than 50% stenosis of the    internal carotid.    Normal screening exam of the left carotid artery.   No aneurysm of the abdominal aorta.    Normal bilateral STANFORD's.   Average vascular age is 55 years as determined by measurement of the   bilateral    carotid intima-medial thickness.   3% risk for a cardiovascular event in the next 10 years according to the    [place] cardiovascular disease risk model.    ASSESSMENT AND PLAN  1. Primary hypercholesterolemia     2. Elevated liver enzymes     3. Acquired hypothyroidism     4. Vitamin D deficiency     5. Hepatic steatosis     6. 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    FH genotyping:  Reviewed, will consider at future visits     ACC/AHA Indication for Statin Therapy, zelda all that apply:  LDL-C at baseline >190 mg/dl: Indication for Moderate intensity statin   - Prior LDL (not on meds) was in the 110-120s range, unclear why recent change, though polygenic FH is possible     Calculated Risk for ASCVD, if applicable    N/A    Other Significant Risk Markers, if any, zelda all that apply   -Total vasc screen, CIMT:  Normal, vasc age 53, 3% 10-yr risk   -CAC:  Ordered today   -Lp(a) 148 - reviewed with patient this is an independent risk  factor for ASCVD but is currently controversial if lowering has impact on outcomes in primary prevention, so targeting LDLc lowering is primary objective for now and consider lp(a) lowering if worsening ascvd risk or if ascvd event occurs - only currently available agents are pcsk9i and niacin.     -LDL-P >2000, small LDL-P 995.  LDL-P/LDL-C concordant = 12, so unlikely need to track LDL-P in the future despite her hypertrig     - will monitor direct LDL-C and apoB as well for now until stable and trigs stable     Goal LDL-C and nonHDL-C based on Clinic Protocol   As of 12/2020:   LDL-C:   <100 mg/dL - not at goal  apoB <90 - not at goal   non-HDL-C - not at goal   LDL-P <1200 - not at goal     Lifestyle Recommendations From Today’s Visit:      Smoking:  reports that she has never smoked. She has never used smokeless tobacco.   - continued complete avoidance of all tobacco products     Physical Activity: continue healthy activity to improve CV fitness, see care instructions for additional details     Weight Management and Nutrition: Dietary plan was discussed with patient at this visit including DASH, low sodium and/or as outlined in care instructions   Eating Plan: Concentrate on  Low sat/Trans fat, Low simple carb, Calorie restricted and DASH/Med Style diet    Statin Therapy Recommendations from Today’s Visit:   Reviewed labs in context of total vasc screen and through shared decision-making she would like to start TLC and supplements and consider statin therapy in the future.   She is aware she will likely need statin.    - start rosuvastatin 5mg daily - monitor liver     Non-Statin Medications Recommendations from Today’s Visit:   - could consider zetia as primary alternative to statin, though statin preferred     Indication for PCSK9 Inhibitor, if applicable:  Not currently indicated    Supplements Recommended at this visit:   - scontinue vit D3 5000 units daily  - continue cholest-off   - consider berberine      Antithrombotic therapy:  Not indicated     Recommendations for Other Cardiovascular Risk Factors, zelda all that apply:     1) Blood Pressure Management:Goal: ACC/AHA (2017) goal <130/80   Home BP at goal: yes   Office BP at goal:  N/a   Plan:   Monitoring:   - start/continue home BP monitoring, reviewed correct technique:  Yes   - order 24h ABPM:  NO  Medications: no meds indicated at this time     2) Glycemic Status: Normal    Other Issues:  1) liver enzyme elevations, resolved.    Hx of possible recurrent HBV based upon HBV testing panel   Does not preclude use of statin therapy for HLD - NLA consensus statement (2014) reports liver enzyme elevation does not contraindicate use of statin  - will monitor liver closely if statin therapy initiated and with each titration step     Studies Ordered at Todays Visit:  CAC   Blood Work Ordered At Today’s visit: as noted above   Follow-Up: 2mo    Time: 30-39min - chart review/prep, review of other providers' records, imaging/lab review, face-to-face time for history/examination, ordering, prescribing,  review of results/meds/ treatment plan with patient/family/caregiver, documentation in EMR, care coordination (as needed)    Ko Martin M.D.   [facility]   [facility]   [phone]

## 2021-06-18 NOTE — PATIENT INSTRUCTIONS
Start rosuvastatin 5mg daily   Start vitamin D3 5,000 units daily due to your low vitamin D level from 12/2020.  Please make sure to increase water intake to >60oz/day ongoing.     Check labs in 2mo    Call to schedule your cardiac CT at [phone].

## 2021-07-29 ENCOUNTER — HOSPITAL ENCOUNTER (OUTPATIENT)
Dept: LAB | Facility: MEDICAL CENTER | Age: 56
End: 2021-07-29
Attending: FAMILY MEDICINE
Payer: COMMERCIAL

## 2021-07-29 ENCOUNTER — HOSPITAL ENCOUNTER (OUTPATIENT)
Dept: RADIOLOGY | Facility: MEDICAL CENTER | Age: 56
End: 2021-07-29
Attending: FAMILY MEDICINE

## 2021-07-29 DIAGNOSIS — Z91.89 OTHER SPECIFIED PERSONAL RISK FACTORS, NOT ELSEWHERE CLASSIFIED: ICD-10-CM

## 2021-07-29 DIAGNOSIS — E78.00 PRIMARY HYPERCHOLESTEROLEMIA: ICD-10-CM

## 2021-07-29 DIAGNOSIS — E78.41 ELEVATED LIPOPROTEIN(A): ICD-10-CM

## 2021-07-29 LAB
ALBUMIN SERPL BCP-MCNC: 4.6 G/DL (ref 3.2–4.9)
ALBUMIN/GLOB SERPL: 1.4 G/DL
ALP SERPL-CCNC: 85 U/L (ref 30–99)
ALT SERPL-CCNC: 39 U/L (ref 2–50)
ANION GAP SERPL CALC-SCNC: 13 MMOL/L (ref 7–16)
AST SERPL-CCNC: 37 U/L (ref 12–45)
BILIRUB SERPL-MCNC: 0.5 MG/DL (ref 0.1–1.5)
BUN SERPL-MCNC: 17 MG/DL (ref 8–22)
CALCIUM SERPL-MCNC: 9.2 MG/DL (ref 8.5–10.5)
CHLORIDE SERPL-SCNC: 102 MMOL/L (ref 96–112)
CHOLEST SERPL-MCNC: 229 MG/DL (ref 100–199)
CO2 SERPL-SCNC: 24 MMOL/L (ref 20–33)
CREAT SERPL-MCNC: 0.47 MG/DL (ref 0.5–1.4)
FASTING STATUS PATIENT QL REPORTED: NORMAL
GLOBULIN SER CALC-MCNC: 3.3 G/DL (ref 1.9–3.5)
GLUCOSE SERPL-MCNC: 111 MG/DL (ref 65–99)
HDLC SERPL-MCNC: 76 MG/DL
LDLC SERPL CALC-MCNC: 113 MG/DL
POTASSIUM SERPL-SCNC: 4.4 MMOL/L (ref 3.6–5.5)
PROT SERPL-MCNC: 7.9 G/DL (ref 6–8.2)
SODIUM SERPL-SCNC: 139 MMOL/L (ref 135–145)
TRIGL SERPL-MCNC: 201 MG/DL (ref 0–149)

## 2021-07-29 PROCEDURE — 36415 COLL VENOUS BLD VENIPUNCTURE: CPT

## 2021-07-29 PROCEDURE — 82172 ASSAY OF APOLIPOPROTEIN: CPT

## 2021-07-29 PROCEDURE — 80061 LIPID PANEL: CPT

## 2021-07-29 PROCEDURE — 83704 LIPOPROTEIN BLD QUAN PART: CPT

## 2021-07-29 PROCEDURE — 80053 COMPREHEN METABOLIC PANEL: CPT

## 2021-07-29 PROCEDURE — 4410556 CT-CARDIAC SCORING (SELF PAY ONLY)

## 2021-07-31 LAB — APO B100 SERPL-MCNC: 110 MG/DL (ref 55–125)

## 2021-08-01 LAB
CHOLEST SERPL-MCNC: 235 MG/DL
HDL PARTICAL NO Q4363: >41 UMOL/L
HDL SIZE Q4361: 8.7 NM
HDLC SERPL-MCNC: 75 MG/DL (ref 40–59)
HLD.LARGE SERPL-SCNC: 6.5 UMOL/L
L VLDL PART NO Q4357: 4.1 NMOL/L
LDL SERPL QN: 21.1 NM
LDL SERPL-SCNC: 1267 NMOL/L
LDL SMALL SERPL-SCNC: 509 NMOL/L
LDLC SERPL CALC-MCNC: 118 MG/DL
PATHOLOGY STUDY: ABNORMAL
TRIGL SERPL-MCNC: 210 MG/DL (ref 30–149)
VLDL SIZE Q4362: 47.5 NM

## 2021-08-17 ENCOUNTER — OFFICE VISIT (OUTPATIENT)
Dept: MEDICAL GROUP | Facility: PHYSICIAN GROUP | Age: 56
End: 2021-08-17
Payer: COMMERCIAL

## 2021-08-17 VITALS
OXYGEN SATURATION: 98 % | WEIGHT: 148.5 LBS | TEMPERATURE: 97.9 F | HEART RATE: 67 BPM | HEIGHT: 62 IN | SYSTOLIC BLOOD PRESSURE: 126 MMHG | RESPIRATION RATE: 12 BRPM | DIASTOLIC BLOOD PRESSURE: 82 MMHG | BODY MASS INDEX: 27.33 KG/M2

## 2021-08-17 DIAGNOSIS — G47.00 INSOMNIA, UNSPECIFIED TYPE: ICD-10-CM

## 2021-08-17 DIAGNOSIS — E03.9 HYPOTHYROIDISM, UNSPECIFIED TYPE: ICD-10-CM

## 2021-08-17 DIAGNOSIS — E03.9 ACQUIRED HYPOTHYROIDISM: ICD-10-CM

## 2021-08-17 DIAGNOSIS — Z12.31 ENCOUNTER FOR SCREENING MAMMOGRAM FOR BREAST CANCER: ICD-10-CM

## 2021-08-17 DIAGNOSIS — K76.0 HEPATIC STEATOSIS: ICD-10-CM

## 2021-08-17 DIAGNOSIS — F32.4 MAJOR DEPRESSIVE DISORDER WITH SINGLE EPISODE, IN PARTIAL REMISSION (HCC): ICD-10-CM

## 2021-08-17 DIAGNOSIS — J45.40 MODERATE PERSISTENT ASTHMA WITHOUT COMPLICATION: ICD-10-CM

## 2021-08-17 DIAGNOSIS — R74.8 ELEVATED LIVER ENZYMES: ICD-10-CM

## 2021-08-17 DIAGNOSIS — E78.5 DYSLIPIDEMIA: ICD-10-CM

## 2021-08-17 DIAGNOSIS — M25.562 ACUTE PAIN OF LEFT KNEE: ICD-10-CM

## 2021-08-17 PROBLEM — N83.8 OVARIAN MASS, RIGHT: Status: RESOLVED | Noted: 2019-08-07 | Resolved: 2021-08-17

## 2021-08-17 PROCEDURE — 99214 OFFICE O/P EST MOD 30 MIN: CPT | Performed by: NURSE PRACTITIONER

## 2021-08-17 RX ORDER — LEVOTHYROXINE SODIUM 0.1 MG/1
TABLET ORAL
Qty: 90 TABLET | Refills: 1 | Status: SHIPPED | OUTPATIENT
Start: 2021-08-17 | End: 2021-09-30 | Stop reason: SDUPTHER

## 2021-08-17 RX ORDER — DICLOFENAC SODIUM 75 MG/1
75 TABLET, DELAYED RELEASE ORAL 2 TIMES DAILY PRN
Qty: 60 TABLET | Refills: 2 | Status: SHIPPED | OUTPATIENT
Start: 2021-08-17 | End: 2022-07-20 | Stop reason: SDUPTHER

## 2021-08-17 RX ORDER — BUDESONIDE AND FORMOTEROL FUMARATE DIHYDRATE 160; 4.5 UG/1; UG/1
AEROSOL RESPIRATORY (INHALATION)
Qty: 6 G | Refills: 11 | Status: SHIPPED | OUTPATIENT
Start: 2021-08-17 | End: 2022-07-20 | Stop reason: SDUPTHER

## 2021-08-17 RX ORDER — TRAZODONE HYDROCHLORIDE 50 MG/1
TABLET ORAL
Qty: 30 TABLET | Refills: 1 | Status: SHIPPED | OUTPATIENT
Start: 2021-08-17 | End: 2022-07-20 | Stop reason: SDUPTHER

## 2021-08-17 ASSESSMENT — PATIENT HEALTH QUESTIONNAIRE - PHQ9
4. FEELING TIRED OR HAVING LITTLE ENERGY: SEVERAL DAYS
SUM OF ALL RESPONSES TO PHQ9 QUESTIONS 1 AND 2: 0
2. FEELING DOWN, DEPRESSED, IRRITABLE, OR HOPELESS: NOT AT ALL
6. FEELING BAD ABOUT YOURSELF - OR THAT YOU ARE A FAILURE OR HAVE LET YOURSELF OR YOUR FAMILY DOWN: NOT AL ALL
3. TROUBLE FALLING OR STAYING ASLEEP OR SLEEPING TOO MUCH: SEVERAL DAYS
9. THOUGHTS THAT YOU WOULD BE BETTER OFF DEAD, OR OF HURTING YOURSELF: NOT AT ALL
5. POOR APPETITE OR OVEREATING: NOT AT ALL
7. TROUBLE CONCENTRATING ON THINGS, SUCH AS READING THE NEWSPAPER OR WATCHING TELEVISION: NOT AT ALL
8. MOVING OR SPEAKING SO SLOWLY THAT OTHER PEOPLE COULD HAVE NOTICED. OR THE OPPOSITE, BEING SO FIGETY OR RESTLESS THAT YOU HAVE BEEN MOVING AROUND A LOT MORE THAN USUAL: NOT AT ALL
1. LITTLE INTEREST OR PLEASURE IN DOING THINGS: NOT AT ALL
SUM OF ALL RESPONSES TO PHQ QUESTIONS 1-9: 2

## 2021-08-17 ASSESSMENT — FIBROSIS 4 INDEX: FIB4 SCORE: 1.46

## 2021-08-17 NOTE — ASSESSMENT & PLAN NOTE
Patient reports ongoing sleep issues.  She is only able to sleep 5 hours a night.  No matter what time she goes to bed, she wakes 5 hours later and cannot go back to sleep.  Reports daytime fatigue.  Her shift at work will hurt between 2 AM and 5 AM.  Has tried melatonin, sleep aids without much relief.  Drinks very little caffeine.  Does play games on iPad/smart phone in bed.
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
## 2021-08-17 NOTE — LETTER
[facility]  NELL Zavala  [address] [place] [address]  Fax: [phone]   Authorization for Release/Disclosure of   Protected Health Information   Name: ZEINAB GARCIA : 1965 SSN: [id number]   Address: [address] [place] [id number] Phone:    [phone] (home)    I authorize the entity listed below to release/disclose the PHI below to:   [facility]/NELL Zavala and NELL Zavala   Provider or Entity Name:     Address   City, State, Zip   Phone:      Fax:     Reason for request: continuity of care   Information to be released:    [  ] LAST COLONOSCOPY,  including any PATH REPORT and follow-up  [  ] LAST FIT/COLOGUARD RESULT [  ] LAST DEXA  [  ] LAST MAMMOGRAM  [  ] LAST PAP  [  ] LAST LABS [  ] RETINA EXAM REPORT  [  ] IMMUNIZATION RECORDS  [  ] Release all info      [  ] Check here and initial the line next to each item to release ALL health information INCLUDING  _____ Care and treatment for drug and / or alcohol abuse  _____ HIV testing, infection status, or AIDS  _____ Genetic Testing    DATES OF SERVICE OR TIME PERIOD TO BE DISCLOSED: _____________  I understand and acknowledge that:  * This Authorization may be revoked at any time by you in writing, except if your health information has already been used or disclosed.  * Your health information that will be used or disclosed as a result of you signing this authorization could be re-disclosed by the recipient. If this occurs, your re-disclosed health information may no longer be protected by State or Federal laws.  * You may refuse to sign this Authorization. Your refusal will not affect your ability to obtain treatment.  * This Authorization becomes effective upon signing and will  on (date) __________.      If no date is indicated, this Authorization will  one (1) year from the signature date.    Name: Zeinab Garcia    Signature:   Date:     2021        PLEASE FAX REQUESTED RECORDS BACK TO: [phone]

## 2021-08-17 NOTE — ASSESSMENT & PLAN NOTE
Chronic health problem, now managed by vascular medicine Dr. Martin.  Patient now taking rosuvastatin, started a month ago.  Tolerating medication, denies muscle aches or weakness.  Has upcoming follow-up appointment with Dr. Martin this week.    Component      Latest Ref Rng & Units 12/28/2020 7/29/2021           7:24 AM  6:44 AM   Cholesterol,Tot      100 - 199 mg/dL 273 (H) 229 (H)   Triglycerides      0 - 149 mg/dL 201 (H) 201 (H)   HDL      >=40 mg/dL 72 76   LDL      <100 mg/dL 161 (H) 113 (H)

## 2021-08-17 NOTE — ASSESSMENT & PLAN NOTE
"Chronic health problem, moderately controlled with lifestyle measures.  Discontinued Zoloft 4 to 5 months ago.  \"Did not feel like I needed it anymore.\"  Has not noticed any significant changes in mood since discontinuing it.  She reports in the last year she has broken up with her boyfriend about her own house.  Continues to work at [employer].  Overall feels good.  Does continue to report fatigue and some lack of motivation because of the fatigue.  Please see additional notes under insomnia.  Denies SI/HI.    "

## 2021-08-17 NOTE — ASSESSMENT & PLAN NOTE
Patient reports left knee pain, onset 4 to 6 weeks ago.  Was severe about 2 weeks ago and unable to barely walk due to the pain.  Denies injury.  Started using KT tape, which has helped a great deal.    Location of pain is posterior aspect with mild edema proximal aspect of anterior knee.  Aggravated by flexion, walking, stair climbing.  She wonders if it is because she has been overcompensating for her foot pain (currently seeing podiatrist).

## 2021-08-17 NOTE — ASSESSMENT & PLAN NOTE
Patient consulted with gastroenterology.  Liver enzymes have returned to normal.  Ultrasound showed no abnormalities.    3/8/2020 11:07 AM     HISTORY/REASON FOR EXAM:  portal vein and spleen size  Hepatitis B and abnormal liver function tests     TECHNIQUE/EXAM DESCRIPTION AND NUMBER OF VIEWS:  Complete abdomen survey.     COMPARISON: None     FINDINGS:  The liver is normal in contour. There is no evidence of solid mass lesion. The liver measures 13.16 cm.     The gallbladder is normal. There is no evidence of cholelithiasis. The gallbladder wall thickness measures 0.27 cm.  There is no pericholecystic fluid.     The common duct measures 0.29 cm.     The visualized pancreas is unremarkable.  The visualized aorta is normal in caliber.     Intrahepatic IVC is patent.     The portal vein is patent with hepatopetal flow. The MPV measures 0.83 cm.     The right kidney measures 10.11 cm.  The left kidney measures 10.37 cm.  There is no hydronephrosis.     The spleen measures 9.56 cm maximally.     The bladder demonstrates no focal wall abnormality.     There is no ascites.     IMPRESSION:     No sonographic evidence of cirrhosis, portal hypertension or other worrisome finding

## 2021-08-17 NOTE — PATIENT INSTRUCTIONS
"Good Sleep Hygiene:    Try to get to bed at the same time every night (even on weekends).  Plan for 8 hours of sleep.  Maintain the same waking time every morning (even on weekends).  [place] the bedroom for sleeping and intimacy.  Do not watch TV in the bedroom. Do not read in bed.  No screen time (TV, smart phone, tablet) within an hour of bedtime.  Develop a \"sleeping ritual\"  Keep room dark and quiet.  Run a small fan for background noise.  Try some relaxation exercises.  Avoid caffeine after noon.      "

## 2021-08-17 NOTE — ASSESSMENT & PLAN NOTE
Liver enzymes have returned to normal on past 2 lab panels.  No longer following with gastroenterology.  Denies abdominal pain, nausea, bowel changes.    Component      Latest Ref Rng & Units 12/28/2020 7/29/2021           7:25 AM  6:44 AM   AST(SGOT)      12 - 45 U/L 30 37   ALT(SGPT)      2 - 50 U/L 37 39

## 2021-08-17 NOTE — ASSESSMENT & PLAN NOTE
This is a chronic health problem that is well controlled with current medications and lifestyle measures.  Taking levothyroxine 100 mcg daily.  Denies skin or hair changes, bowel changes, changes in fatigue.    Component      Latest Ref Rng & Units 12/28/2020           7:24 AM   TSH      0.380 - 5.330 uIU/mL 3.640
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
## 2021-08-20 ENCOUNTER — OFFICE VISIT (OUTPATIENT)
Dept: VASCULAR LAB | Facility: MEDICAL CENTER | Age: 56
End: 2021-08-20
Attending: FAMILY MEDICINE
Payer: COMMERCIAL

## 2021-08-20 VITALS
BODY MASS INDEX: 26.68 KG/M2 | SYSTOLIC BLOOD PRESSURE: 132 MMHG | HEIGHT: 62 IN | WEIGHT: 145 LBS | DIASTOLIC BLOOD PRESSURE: 81 MMHG | HEART RATE: 69 BPM

## 2021-08-20 DIAGNOSIS — R74.8 ELEVATED LIVER ENZYMES: ICD-10-CM

## 2021-08-20 DIAGNOSIS — E55.9 VITAMIN D DEFICIENCY: ICD-10-CM

## 2021-08-20 DIAGNOSIS — R73.01 IFG (IMPAIRED FASTING GLUCOSE): ICD-10-CM

## 2021-08-20 DIAGNOSIS — E78.41 ELEVATED LIPOPROTEIN(A): ICD-10-CM

## 2021-08-20 DIAGNOSIS — K76.0 HEPATIC STEATOSIS: ICD-10-CM

## 2021-08-20 DIAGNOSIS — E78.00 PRIMARY HYPERCHOLESTEROLEMIA: ICD-10-CM

## 2021-08-20 DIAGNOSIS — E03.9 ACQUIRED HYPOTHYROIDISM: ICD-10-CM

## 2021-08-20 PROCEDURE — 99212 OFFICE O/P EST SF 10 MIN: CPT

## 2021-08-20 PROCEDURE — 99214 OFFICE O/P EST MOD 30 MIN: CPT | Performed by: FAMILY MEDICINE

## 2021-08-20 ASSESSMENT — ENCOUNTER SYMPTOMS
CLAUDICATION: 0
CHILLS: 0
NAUSEA: 0
COUGH: 0
FEVER: 0
ORTHOPNEA: 0
WEAKNESS: 0
MYALGIAS: 0
PALPITATIONS: 0
BRUISES/BLEEDS EASILY: 0

## 2021-08-20 ASSESSMENT — FIBROSIS 4 INDEX: FIB4 SCORE: 1.46

## 2021-08-20 NOTE — PROGRESS NOTES
[facility] - Follow-up  Date of Service:  06/18/21     Kev Garcia has been referred for evaluation and management of dyslipidemia    Referral Source: Holly Tineo    HPI  Interval hx/concerns: no major new issues.  Had labs and CAC score.      History of ASCVD: No  Other Established (non-atherosclerotic) Vascular Disease, if Present: None  Age at Initial Diagnosis of Dyslipidemia: many years     Current Prescription Lipid Lowering Medications - including dose:   Statin: rosuva 5mg daily   Non-Statin: None  Current Lipid Lowering and Related Supplements:   None  Any Current Side Effects Potentially Related to Lipid Lowering therapy?   No  Current Adherence to Lipid Lowering Therapies   Complete  Previously Attempted Interventions for Lipids - including outcome  Statin: None     Outcome: N/A  Non-Statin: None    Outcome: N/A  Any Previous History of Statin Intolerance? No  Baseline Lipids Prior to Treatment:      Ref. Range 12/9/2019 08:51   Cholesterol,Tot Latest Ref Range: 100 - 199 mg/dL 295 (H)   Triglycerides Latest Ref Range: 0 - 149 mg/dL 166 (H)   HDL Latest Ref Range: >=40 mg/dL 71   LDL Latest Ref Range: <100 mg/dL 191 (H)     Other Pertinent History:     Anticoagulation/antiplats:  no    History of other CV risk factors:    Had prior duplex with left carotid art stenosis, CTA neck was normal in 2016    HTN:  No prior HTN or antiHTN meds.     Other:    Hypothyroidism:  Stable, no issues. Tolerating meds.  Last TSH normal     Liver disease:   No interval changes.  Had labs 12/2020 - resolved   Noted to have hepatic steatosis in the past.  Hx of HBV+, most recent labs showed HBV core Ab positive but HBsAg negative, indicating likely recurrent, active disease w/o immunity.   Had elevated ALT, AST w/o elevated T bili.  Remainder of w/u neg.  No prior statin-induced acute liver injury   Seeing GI     CURRENT MEDICATIONS:   Current Outpatient Medications:   •  diclofenac DR, 75 mg, Oral,  BID PRN, Taking  •  levothyroxine, Take one tab daily 1/2 hour prior to breakfast on an empty stomach., Taking  •  traZODone, Take 1 tab an hour before bed as needed for insomnia, Taking  •  budesonide-formoterol, INHALE TWO PUFFS BY MOUTH TWICE A DAY. RINSE MOUTH AFTER EACH USE, Taking  •  rosuvastatin, 5 mg, Oral, Q EVENING, Taking  •  CALCIUM PO, Take  by mouth., Taking  •  MILK THISTLE PO, Take  by mouth., Taking  •  Cyanocobalamin (B-12 PO), Take  by mouth., Taking  •  TURMERIC PO, Take  by mouth. Takes in liquid form, Taking  •  Coenzyme Q10 (COQ10 PO), Take  by mouth. Taking in liquid form, Taking  •  NATTOKINASE PO, Take  by mouth., Taking  •  Vitamin D-3, Take  by mouth., Taking  •  albuterol, 1-2 Puff, Inhalation, Q4HRS PRN, Taking  •  ipratropium-albuterol, 3 mL, Nebulization, 4XDAY, Taking    ALLERGIES: Patient has no known allergies.    SOCIAL HISTORY   Social History     Tobacco Use   • Smoking status: Never Smoker   • Smokeless tobacco: Never Used   Substance Use Topics   • Alcohol use: Yes     Alcohol/week: 0.6 oz     Types: 1 Shots of liquor per week     Comment: every few days   • Drug use: No     Change in weight: Stable  BMI Readings from Last 5 Encounters:   08/20/21 26.52 kg/m²   08/17/21 26.98 kg/m²   10/29/20 27.14 kg/m²   07/24/20 26.89 kg/m²   06/10/20 26.52 kg/m²     Exercise habits: minimal exercise   Diet: common adult, low fat - ongoing fat reduction     Review of Systems   Constitutional: Negative for chills and fever.   Respiratory: Negative for cough.    Cardiovascular: Negative for chest pain, palpitations, orthopnea, claudication and leg swelling.   Gastrointestinal: Negative for nausea.   Musculoskeletal: Negative for myalgias.   Neurological: Negative for weakness.   Endo/Heme/Allergies: Does not bruise/bleed easily.     Vitals:    08/20/21 1318   BP: 132/81   BP Location: Left arm   Patient Position: Sitting   BP Cuff Size: Adult   Pulse: 69   Weight: 65.8 kg (145 lb)   Height:  "1.575 m (5' 2\")      BP Readings from Last 5 Encounters:   08/20/21 132/81   08/17/21 126/82   10/29/20 118/68   07/24/20 119/78   12/10/19 120/80   ]  Physical Exam  Constitutional:       General: She is not in acute distress.     Appearance: She is well-developed. She is not diaphoretic.   HENT:      Head: Normocephalic.   Eyes:      Conjunctiva/sclera: Conjunctivae normal.   Pulmonary:      Effort: Pulmonary effort is normal. No respiratory distress.   Skin:     Coloration: Skin is not pale.      Findings: No rash.   Neurological:      Mental Status: She is alert and oriented to person, place, and time.      Cranial Nerves: No cranial nerve deficit.   Psychiatric:         Behavior: Behavior normal.       DATA REVIEW:  Most Recent Lipid Panel:   Lab Results   Component Value Date    CHOLSTRLTOT 229 (H) 07/29/2021    CHOLSTRLTOT 235 (H) 07/29/2021    TRIGLYCERIDE 201 (H) 07/29/2021    TRIGLYCERIDE 210 (H) 07/29/2021    HDL 76 07/29/2021    HDL 75 (H) 07/29/2021     (H) 07/29/2021      Ref. Range 12/28/2020 07:25 7/29/2021 06:44   Apolipoprotein-B Latest Ref Range: 55 - 125 mg/dL 152 (H) 110   Lipoprotein (a) Latest Ref Range: <=29 mg/dL 148 (H)       Ref. Range 7/29/2021 06:44   Cholesterol,Tot Latest Ref Range: <=199 mg/dL 235 (H)   Triglycerides Latest Ref Range: 30 - 149 mg/dL 210 (H)   HDL Latest Ref Range: 40 - 59 mg/dL 75 (H)   EER LipoFit by NMR Unknown See Note   LDL Cholesterol Latest Ref Range: <=129 mg/dL 118   HDL Particle No. Latest Ref Range: >=33.0 umol/L >41.0   Small LDL Latest Ref Range: <=634 nmol/L 509   L-HDL Particle No. Latest Ref Range: >=4.2 umol/L 6.5   VLDL Size Latest Ref Range: <=46.7 nm 47.5 (H)   L-VLDL Particle No. Latest Ref Range: <=2.7 nmol/L 4.1 (H)   HDL Size Latest Ref Range: >=8.9 nm 8.7 (L)   LDL Particle Size Latest Ref Range: >=20.7 nm 21.1   LDL Particle Latest Ref Range: <=1135 nmol/L 1267 (H)     Lipoprotein A Latest Ref Range: <=29 mg/dL 148 (H)     Other " Pertinent Blood Work:   Lab Results   Component Value Date    SODIUM 139 07/29/2021    POTASSIUM 4.4 07/29/2021    CHLORIDE 102 07/29/2021    CO2 24 07/29/2021    ANION 13.0 07/29/2021    GLUCOSE 111 (H) 07/29/2021    BUN 17 07/29/2021    CREATININE 0.47 (L) 07/29/2021    CALCIUM 9.2 07/29/2021    ASTSGOT 37 07/29/2021    ALTSGPT 39 07/29/2021    ALKPHOSPHAT 85 07/29/2021    TBILIRUBIN 0.5 07/29/2021    ALBUMIN 4.6 07/29/2021    AGRATIO 1.4 07/29/2021    LIPOPROTA 148 (H) 12/28/2020    TSHULTRASEN 3.640 12/28/2020    CPKTOTAL 184 (H) 12/28/2020      Ref. Range 12/28/2020 07:25   25-Hydroxy   Vitamin D 25 Latest Ref Range: 30 - 100 ng/mL 25 (L)      Ref. Range 12/28/2020 07:25   CPK Total Latest Ref Range: 0 - 154 U/L 184 (H)      Ref. Range 7/17/2019 07:04   25-Hydroxy   Vitamin D 25 Latest Ref Range: 30 - 100 ng/mL 18 (L)      Ref. Range 3/2/2020 06:16   AST(SGOT) Latest Ref Range: 12 - 45 U/L 41   ALT(SGPT) Latest Ref Range: 2 - 50 U/L 39   Alkaline Phosphatase Latest Ref Range: 30 - 99 U/L 72   Total Bilirubin Latest Ref Range: 0.1 - 1.5 mg/dL 0.4   Direct Bilirubin Latest Ref Range: 0.1 - 0.5 mg/dL <0.1   Indirect Bilirubin Latest Ref Range: 0.0 - 1.0 mg/dL see below   Albumin Latest Ref Range: 3.2 - 4.9 g/dL 4.5   Total Protein Latest Ref Range: 6.0 - 8.2 g/dL 8.1   Iron Latest Ref Range: 40 - 170 ug/dL 80   Total Iron Binding Latest Ref Range: 250 - 450 ug/dL 435   % Saturation Latest Ref Range: 15 - 55 % 18      Ref. Range 3/2/2020 06:16   AFP L3% Latest Ref Range: 0.0 - 9.9 % <0.5   Alpha Fetoprotein Latest Ref Range: 0 - 15 ng/mL 2   Ferritin Latest Ref Range: 10.0 - 291.0 ng/mL 108.2   Hep A Virus Antibody Total Latest Ref Range: Negative  Negative   Hep B Surface Antibody Quant Latest Ref Range: 0.00 - 10.00 mIU/mL <3.10   Hepatitis B Surface Antigen Latest Ref Range: Negative  Negative   Hepatitis B Core Ab, Total Latest Ref Range: Negative  Reactive (A)   Hepatitis C Antibody Latest Ref Range: Negative   Negative   F-Actin Ab, IgG Latest Ref Range: 0 - 19 Units 4   Antinuclear Antibody Latest Ref Range: None Detected  None Detected     VASCULAR IMAGING:     Last EKG:   Results for orders placed or performed during the hospital encounter of 16   EKG (NOW)   Result Value Ref Range    Report       [facility] Emergency Dept.    Test Date:  2016  Pt Name:    ZEINAB CHRISTIAN                  Department: ER  MRN:        [id number]                      Room:       [facility]  Gender:     F                            Technician: [id number]  :        1965                   Requested By:BRYANNA CONTRERAS  Order #:    [id number]                    Reading MD: BRYANNA CONTRERAS MD    Measurements  Intervals                                Axis  Rate:       58                           P:          34  ME:         156                          QRS:        47  QRSD:       82                           T:          33  QT:         476  QTc:        468    Interpretive Statements  SINUS BRADYCARDIA  No previous ECG available for comparison    Electronically Signed On 2017 11:12:53 PST by BRYANNA CONTRERAS MD       US abd 3/8/20  No sonographic evidence of cirrhosis, portal hypertension or other worrisome finding    Carotid   1.  There is no visible atherosclerotic plaque.   2.  There is no evidence of carotid occlusion.   3. Diameter reduction in the internal carotid arteries is estimated at 50-70% on the left and less than 50% on the right.   4. Vertebral arteries demonstrate antegrade flow.     CTA head/neck    Normal   1. No evidence of flow-limiting stenosis in the cervical carotid or cervical vertebral arteries.    CT abd/pelvis 3/2019  There is mild scattered arterial calcification. The abdominal aorta is ectatic.  1.  No acute intra-abdominal findings.   2.  Hepatic steatosis.   3.  Atherosclerosis.   4.  2.3 cm hypodense mass in the right ovary likely represents a cyst and is likely benign.  Assuming postmenopausal state, follow-up ultrasound is recommended in 6-12 months    Total vasc screen with CIMT 9/4/20    ABDOMINAL AORTA SCAN:   No aneurysm of the abdominal aorta.   No plaque observed in the abdominal aorta.   The maximum diameter of the aorta is 1.8 cm.    Doppler velocity is normal at 117 cm/s.     ANKLE-BRACHIAL INDEX (STANFORD):   All waveforms are brisk and triphasic.   Right STANFORD is 1.27   Left STANFORD is 1.21   STANFORD's are normal.     CONCLUSIONS   Mild plaque in the right carotid artery with less than 50% stenosis of the    internal carotid.    Normal screening exam of the left carotid artery.   No aneurysm of the abdominal aorta.    Normal bilateral STANFORD's.   Average vascular age is 55 years as determined by measurement of the   bilateral    carotid intima-medial thickness.   3% risk for a cardiovascular event in the next 10 years according to the    Ingram cardiovascular disease risk model.    CAC scoring 7/29/21   Coronary calcification:  LMA - 0.0  LCX - 0.0  LAD - 0.0  RCA - 0.0  PDA - 0.0   Total Calcium Score: 0.0   Percentile: Calcium score is below the 50th percentile for the patient's age and sex.   Other findings:  Heart: Normal size.  Lungs: Minimal linear bibasilar opacities, atelectasis or early scarring  Mediastinum: Normal. The ascending aorta measures 3.8 cm.  Upper abdomen: Normal.    ASSESSMENT AND PLAN  1. Primary hypercholesterolemia  Lipid Profile    Comp Metabolic Panel    HEMOGLOBIN A1C   2. Elevated liver enzymes     3. Acquired hypothyroidism     4. Vitamin D deficiency     5. Hepatic steatosis     6. BMI 26.0-26.9,adult     7. Elevated lipoprotein(a)     8. IFG (impaired fasting glucose)  HEMOGLOBIN A1C     Patient Type, check all that apply:   Primary Prevention    Established Atherosclerotic Cardiovascular Disease (ASCVD)No    Other Established (non-atherosclerotic) Vascular Disease, if Present:  None    Evidence of Heterozygous Familial Hypercholesterolemia (FH):    Yes  -baseline LDL-C >190  -mother with severe HLD   -no early fhx of ascvd events.    FH genotyping:  Reviewed, will consider at future visits     ACC/AHA Indication for Statin Therapy, zelda all that apply:  LDL-C at baseline >190 mg/dl: Indication for Either moderate or high intensity statinwith LDL reduction of >50% as primary target, and LDL-C <100.    - Prior LDL (not on meds) was in the 110-120s range, unclear why recent change, though polygenic FH is possible     Calculated Risk for ASCVD, if applicable    N/A    Other Significant Risk Markers, if any, zelda all that apply   -Total vasc screen, CIMT:  Normal, vasc age 53, 3% 10-yr risk   -CAC = 0, 7/2021 - indicating less intensive therapy recommended   -Lp(a) 148 - reviewed with patient this is an independent risk factor for ASCVD but is currently controversial if lowering has impact on outcomes in primary prevention, so targeting LDLc lowering is primary objective for now and consider lp(a) lowering if worsening ascvd risk or if ascvd event occurs - only currently available agents are pcsk9i and niacin.     -LDL-P >2000, small LDL-P 995.  LDL-P/LDL-C concordant = 12, so unlikely need to track LDL-P in the future despite her hypertrig     - will monitor direct LDL-C and apoB as well for now until stable and trigs stable     Goal LDL-C and nonHDL-C based on Clinic Protocol   As of  7/2021   LDL-C:   <100 mg/dL approaching - will monitor   apoB <90 - not at goal   non-HDL-C - approaching   LDL-P <1200 - approaching      Lifestyle Recommendations From Today’s Visit:      Smoking:  reports that she has never smoked. She has never used smokeless tobacco.   - continued complete avoidance of all tobacco products     Physical Activity: continue healthy activity to improve CV fitness, see care instructions for additional details     Weight Management and Nutrition: Dietary plan was discussed with patient at this visit including DASH, low sodium and/or as outlined in  care instructions   Eating Plan: Concentrate on  Low sat/Trans fat, Low simple carb, Calorie restricted and DASH/Med Style diet    Statin Therapy Recommendations from Today’s Visit:   Reviewed labs in context of total vasc screen and through shared decision-making she would like to start TLC and supplements and consider statin therapy in the future.   She is aware she will likely need statin.    - continue rosuvastatin 5mg daily, increase to 10mg based upon next labs     Non-Statin Medications Recommendations from Today’s Visit:   - could consider zetia as primary alternative to statin, though statin preferred     Indication for PCSK9 Inhibitor, if applicable:  Not currently indicated    Supplements Recommended at this visit:   - continue vit D3 5000 units daily  - continue cholest-off   - consider berberine     Antithrombotic therapy:  Not indicated     Recommendations for Other Cardiovascular Risk Factors, zelda all that apply:     1) Blood Pressure Management:Goal: ACC/AHA (2017) goal <130/80   Home BP at goal: yes   Office BP at goal:  yes  Plan:   Monitoring:   - start/continue home BP monitoring, reviewed correct technique:  Yes   - order 24h ABPM:  NO  Medications: no meds indicated at this time     2) Glycemic Status: Normal, IFG since on statin   - monitor closely     Other Issues:    1) liver enzyme elevations, resolved.    - liver stable since on rosuva  Hx of possible recurrent HBV based upon HBV testing panel   Does not preclude use of statin therapy for HLD - NLA consensus statement (2014) reports liver enzyme elevation does not contraindicate use of statin  - will monitor liver closely if statin therapy initiated and with each titration step     Studies Ordered at Todays Visit:  None    Blood Work Ordered At Today’s visit: as noted above   Follow-Up: 6mo    Ko Martin M.D.   [facility]   [facility]   [phone]

## 2021-08-28 ENCOUNTER — HOSPITAL ENCOUNTER (OUTPATIENT)
Dept: RADIOLOGY | Facility: MEDICAL CENTER | Age: 56
End: 2021-08-28
Attending: NURSE PRACTITIONER
Payer: COMMERCIAL

## 2021-08-28 DIAGNOSIS — M25.562 ACUTE PAIN OF LEFT KNEE: ICD-10-CM

## 2021-08-28 PROCEDURE — 73564 X-RAY EXAM KNEE 4 OR MORE: CPT | Mod: LT

## 2021-09-30 DIAGNOSIS — E03.9 HYPOTHYROIDISM, UNSPECIFIED TYPE: ICD-10-CM

## 2021-09-30 RX ORDER — LEVOTHYROXINE SODIUM 0.1 MG/1
TABLET ORAL
Qty: 90 TABLET | Refills: 3 | Status: SHIPPED | OUTPATIENT
Start: 2021-09-30 | End: 2022-07-20 | Stop reason: SDUPTHER

## 2021-10-03 ENCOUNTER — PATIENT MESSAGE (OUTPATIENT)
Dept: MEDICAL GROUP | Facility: PHYSICIAN GROUP | Age: 56
End: 2021-10-03

## 2021-10-03 DIAGNOSIS — M25.562 CHRONIC PAIN OF LEFT KNEE: ICD-10-CM

## 2021-10-03 DIAGNOSIS — G89.29 CHRONIC PAIN OF LEFT KNEE: ICD-10-CM

## 2021-10-26 ENCOUNTER — HOSPITAL ENCOUNTER (OUTPATIENT)
Dept: LAB | Facility: MEDICAL CENTER | Age: 56
End: 2021-10-26
Attending: NURSE PRACTITIONER
Payer: COMMERCIAL

## 2021-10-26 DIAGNOSIS — E03.9 HYPOTHYROIDISM, UNSPECIFIED TYPE: ICD-10-CM

## 2021-10-26 LAB — TSH SERPL DL<=0.005 MIU/L-ACNC: 1.35 UIU/ML (ref 0.38–5.33)

## 2021-10-26 PROCEDURE — 36415 COLL VENOUS BLD VENIPUNCTURE: CPT

## 2021-10-26 PROCEDURE — 84443 ASSAY THYROID STIM HORMONE: CPT

## 2021-11-04 ENCOUNTER — TELEPHONE (OUTPATIENT)
Dept: MEDICAL GROUP | Facility: PHYSICIAN GROUP | Age: 56
End: 2021-11-04

## 2021-11-04 ENCOUNTER — HOSPITAL ENCOUNTER (OUTPATIENT)
Dept: RADIOLOGY | Facility: MEDICAL CENTER | Age: 56
End: 2021-11-04
Attending: NURSE PRACTITIONER
Payer: COMMERCIAL

## 2021-11-04 DIAGNOSIS — M25.562 CHRONIC PAIN OF LEFT KNEE: ICD-10-CM

## 2021-11-04 DIAGNOSIS — M17.10 ARTHRITIS OF KNEE: ICD-10-CM

## 2021-11-04 DIAGNOSIS — R93.6 ABNORMAL MRI, KNEE: ICD-10-CM

## 2021-11-04 DIAGNOSIS — M25.562 ACUTE PAIN OF LEFT KNEE: ICD-10-CM

## 2021-11-04 DIAGNOSIS — S83.207D ACUTE MENISCAL TEAR OF LEFT KNEE, SUBSEQUENT ENCOUNTER: ICD-10-CM

## 2021-11-04 DIAGNOSIS — G89.29 CHRONIC PAIN OF LEFT KNEE: ICD-10-CM

## 2021-11-04 PROCEDURE — 73721 MRI JNT OF LWR EXTRE W/O DYE: CPT | Mod: LT

## 2022-02-25 ENCOUNTER — APPOINTMENT (OUTPATIENT)
Dept: VASCULAR LAB | Facility: MEDICAL CENTER | Age: 57
End: 2022-02-25
Payer: COMMERCIAL

## 2022-03-10 NOTE — TELEPHONE ENCOUNTER
MEDICATION PRIOR AUTHORIZATION NEEDED:    1. Name of Medication: Advair Generic    2. Requested By (Name of Pharmacy): [facility]     3. Is insurance on file current? Yes    4. What is the name & phone number of the 3rd party payor? [employer] [place]    Key- [id number]          See POC for eval

## 2022-06-30 ENCOUNTER — TELEPHONE (OUTPATIENT)
Dept: VASCULAR LAB | Facility: MEDICAL CENTER | Age: 57
End: 2022-06-30
Payer: COMMERCIAL

## 2022-06-30 NOTE — TELEPHONE ENCOUNTER
Called patient and left message to schedule f/u for pharmacotherapy visit.    ----- Message from MEME Cuenca sent at 6/30/2022  8:20 AM PDT -----  Regarding: needs appt  Pt way overdue for f/u- recently had labs done.  Needs appt to go over labs, ok to sched pharmaco f/u for lipids.      SF

## 2022-07-06 ENCOUNTER — NON-PROVIDER VISIT (OUTPATIENT)
Dept: VASCULAR LAB | Facility: MEDICAL CENTER | Age: 57
End: 2022-07-06
Attending: INTERNAL MEDICINE
Payer: COMMERCIAL

## 2022-07-06 DIAGNOSIS — E78.00 PRIMARY HYPERCHOLESTEROLEMIA: ICD-10-CM

## 2022-07-06 PROCEDURE — 99212 OFFICE O/P EST SF 10 MIN: CPT | Performed by: PHARMACIST

## 2022-07-06 RX ORDER — ROSUVASTATIN CALCIUM 10 MG/1
10 TABLET, COATED ORAL EVERY EVENING
Qty: 90 TABLET | Refills: 1 | Status: SHIPPED | OUTPATIENT
Start: 2022-07-06 | End: 2022-09-28 | Stop reason: SDUPTHER

## 2022-07-06 NOTE — PROGRESS NOTES
[facility] - FollowUp Visit  Date of Service: 07/06/22    Kev Garcia is here for follow up of dyslipidemia.    Subjective    HPI  Pertinent Interval History since last visit:   Ran out of rosuvastatin ~five days prior to labs, has yet to refill.  TLC have not been optimal since last visit.  Current Prescription Lipid Lowering Medications - including dose:   Statin: Rosuvastatin 5mg QD  Non-Statin: None  Current Lipid Lowering and Related Supplements:   - continue vit D3 5000 units daily  - continue cholest-off   - consider berberine  Any Current Side Effects Potentially Related to Lipid Lowering therapy?   No  Current Adherence to Lipid Lowering Therapies:  Complete - other than running out of refills on rosuvastatin   Any Previous History of Statin Intolerance?   No  Baseline Lipids Prior to Treatment:      Ref. Range 12/9/2019 08:51   Cholesterol,Tot Latest Ref Range: 100 - 199 mg/dL 295 (H)   Triglycerides Latest Ref Range: 0 - 149 mg/dL 166 (H)   HDL Latest Ref Range: >=40 mg/dL 71   LDL Latest Ref Range: <100 mg/dL 191 (H)        SOCIAL HISTORY  Social History     Tobacco Use   Smoking Status Never Smoker   Smokeless Tobacco Never Used      Change in weight: Stable  Exercise habits: no regular exercise program - previously had consistent exercise program, but has had difficult time recommitting.  Diet: common adult - patient admits dietary habits have worsened, especially with food options at work.  Eating more meals high in fat and simple carbs.      Objective    There were no vitals filed for this visit.   Physical Exam    DATA REVIEW  Most Recent Lipid Panel:     Other Pertinent Blood Work:   Lab Results   Component Value Date    SODIUM 139 07/29/2021    POTASSIUM 4.4 07/29/2021    CHLORIDE 102 07/29/2021    CO2 24 07/29/2021    ANION 13.0 07/29/2021    GLUCOSE 111 (H) 07/29/2021    BUN 17 07/29/2021    CREATININE 0.47 (L) 07/29/2021    CALCIUM 9.2 07/29/2021    ASTSGOT 37 07/29/2021    ALTSGPT  39 07/29/2021    ALKPHOSPHAT 85 07/29/2021    TBILIRUBIN 0.5 07/29/2021    ALBUMIN 4.6 07/29/2021    AGRATIO 1.4 07/29/2021    LIPOPROTA 148 (H) 12/28/2020    TSHULTRASEN 1.350 10/26/2021    CPKTOTAL 184 (H) 12/28/2020       Other:  NA    Recent Imaging Studies:    FINDINGS:     Coronary calcification:  LMA - 0.0  LCX - 0.0  LAD - 0.0  RCA - 0.0  PDA - 0.0     Total Calcium Score: 0.0    ASSESSMENT AND PLAN  Patient Type, check all that apply:   Primary Prevention  Established Atherosclerotic Cardiovascular Disease (ASCVD)  No  Other Established (non-atherosclerotic) Vascular Disease, if Present:    None  Evidence of Heterozygous Familial 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    FH genotyping:  Reviewed, will consider at future visits  ACC/AHA Indication for Statin Therapy, zelda all that apply:   LDL-C at baseline >190 mg/dl: Indication for High intensity statin -LDL reduction of >50% as primary target, and LDL-C <100.    - Prior LDL (not on meds) was in the 110-120s range, unclear why recent change, though polygenic FH is possible    Calculated Risk for ASCVD, if applicable:  N/A  Other Significant Risk Markers, if any, zelda all that apply:  -Total vasc screen, CIMT:  Normal, vasc age 53, 3% 10-yr risk   -CAC = 0, 7/2021 - indicating less intensive therapy recommended   -Lp(a) 148 - reviewed with patient this is an independent risk factor for ASCVD but is currently controversial if lowering has impact on outcomes in primary prevention, so targeting LDLc lowering is primary objective for now and consider lp(a) lowering if worsening ascvd risk or if ascvd event occurs - only currently available agents are pcsk9i and niacin.      -LDL-P >2000, small LDL-P 995.  LDL-P/LDL-C concordant = 12, so unlikely need to track LDL-P in the future despite her hypertrig      - will monitor direct LDL-C and apoB as well for now until stable and trigs  stable  National Lipid Association (NLA) Goal (if applicable):  LDL-C:   <100 mg/dL  Lifestyle Recommendations From Today’s Visit:   Eating Plan: Concentrate on  Low sat/Trans fat and Low simple carb - discussed need for decreasing fatty foods and simple carbs at work.  Focus on vegetables, lean meats, and healthy fats.  Exercise: Begin implementing consistent exercise plan.  Encouraged patient to keep low impact and begin with couple times per week, then begin building on frequency/duration  Statin Recommendations from Today's Visit  Increase Rosuvastatin to 10mg QD  Non-Statin Medications Recommendations from Today’s Visit:   None - consider addition of ezetimibe if LDL-C not to goal and patient declines increase in statin dose  Indication for PCSK9 Inhibitor, if applicable:  Not currently indicated  Supplements Recommended at this visit:  - continue vit D3 5000 units daily  - continue cholest-off   - consider berberine  Recommendations for Other Cardiovascular Risk Factors, zelda all that apply:   BP at goal  Other Issues:  None    Patient LDL-C above goal.  She did run out of refills on rosuvastatin several days prior to labs.  Denies any intolerances to statin therapy.  Patient admits to poor diet and exercise habits recently, which reflects in her increase in TG's.  She is open to increase in statin dose, but would like to avoid other therapies at this time.    Studies Ordered at Todays Visit:  None   Blood Work Ordered At Today’s visit:   Lipid Panel  CMP  Vitamin D  Follow-Up:   3 months    John Steele, PharmD, BCACP    CC:  NELL Zavala

## 2022-07-20 ENCOUNTER — OFFICE VISIT (OUTPATIENT)
Dept: MEDICAL GROUP | Facility: PHYSICIAN GROUP | Age: 57
End: 2022-07-20
Payer: COMMERCIAL

## 2022-07-20 VITALS
RESPIRATION RATE: 14 BRPM | BODY MASS INDEX: 27.97 KG/M2 | HEART RATE: 89 BPM | TEMPERATURE: 97.6 F | SYSTOLIC BLOOD PRESSURE: 122 MMHG | OXYGEN SATURATION: 97 % | HEIGHT: 62 IN | WEIGHT: 152 LBS | DIASTOLIC BLOOD PRESSURE: 74 MMHG

## 2022-07-20 DIAGNOSIS — E03.9 ACQUIRED HYPOTHYROIDISM: ICD-10-CM

## 2022-07-20 DIAGNOSIS — G89.29 CHRONIC PAIN OF LEFT KNEE: ICD-10-CM

## 2022-07-20 DIAGNOSIS — E78.5 DYSLIPIDEMIA: ICD-10-CM

## 2022-07-20 DIAGNOSIS — J45.40 MODERATE PERSISTENT ASTHMA WITHOUT COMPLICATION: ICD-10-CM

## 2022-07-20 DIAGNOSIS — Z12.31 ENCOUNTER FOR SCREENING MAMMOGRAM FOR BREAST CANCER: ICD-10-CM

## 2022-07-20 DIAGNOSIS — G47.00 INSOMNIA, UNSPECIFIED TYPE: ICD-10-CM

## 2022-07-20 DIAGNOSIS — E03.9 HYPOTHYROIDISM, UNSPECIFIED TYPE: ICD-10-CM

## 2022-07-20 DIAGNOSIS — M25.562 CHRONIC PAIN OF LEFT KNEE: ICD-10-CM

## 2022-07-20 PROBLEM — F32.4 MAJOR DEPRESSIVE DISORDER WITH SINGLE EPISODE, IN PARTIAL REMISSION (HCC): Status: RESOLVED | Noted: 2019-12-10 | Resolved: 2022-07-20

## 2022-07-20 PROBLEM — R74.8 ELEVATED LIVER ENZYMES: Status: RESOLVED | Noted: 2019-08-07 | Resolved: 2022-07-20

## 2022-07-20 PROCEDURE — 99214 OFFICE O/P EST MOD 30 MIN: CPT | Performed by: NURSE PRACTITIONER

## 2022-07-20 RX ORDER — BUDESONIDE AND FORMOTEROL FUMARATE DIHYDRATE 160; 4.5 UG/1; UG/1
AEROSOL RESPIRATORY (INHALATION)
Qty: 6 G | Refills: 11 | Status: SHIPPED | OUTPATIENT
Start: 2022-07-20

## 2022-07-20 RX ORDER — TRAZODONE HYDROCHLORIDE 50 MG/1
TABLET ORAL
Qty: 30 TABLET | Refills: 1 | Status: SHIPPED | OUTPATIENT
Start: 2022-07-20

## 2022-07-20 RX ORDER — MONTELUKAST SODIUM 10 MG/1
10 TABLET ORAL DAILY
Qty: 90 TABLET | Refills: 1 | Status: SHIPPED | OUTPATIENT
Start: 2022-07-20

## 2022-07-20 RX ORDER — LEVOTHYROXINE SODIUM 112 UG/1
TABLET ORAL
Qty: 90 TABLET | Refills: 0 | Status: SHIPPED | OUTPATIENT
Start: 2022-07-20 | End: 2022-09-29 | Stop reason: SDUPTHER

## 2022-07-20 RX ORDER — IPRATROPIUM BROMIDE AND ALBUTEROL SULFATE 2.5; .5 MG/3ML; MG/3ML
3 SOLUTION RESPIRATORY (INHALATION) 4 TIMES DAILY
Qty: 100 EACH | Refills: 0 | Status: SHIPPED | OUTPATIENT
Start: 2022-07-20

## 2022-07-20 RX ORDER — ALBUTEROL SULFATE 90 UG/1
1-2 AEROSOL, METERED RESPIRATORY (INHALATION) EVERY 4 HOURS PRN
Qty: 1 EACH | Refills: 3 | Status: SHIPPED | OUTPATIENT
Start: 2022-07-20

## 2022-07-20 RX ORDER — DICLOFENAC SODIUM 75 MG/1
75 TABLET, DELAYED RELEASE ORAL 2 TIMES DAILY PRN
Qty: 60 TABLET | Refills: 2 | Status: SHIPPED | OUTPATIENT
Start: 2022-07-20

## 2022-07-20 ASSESSMENT — PATIENT HEALTH QUESTIONNAIRE - PHQ9: CLINICAL INTERPRETATION OF PHQ2 SCORE: 0

## 2022-07-20 NOTE — ASSESSMENT & PLAN NOTE
Chronic health problem.  Taking levothyroxine 100 mcg daily.  TSH is mildly elevated.  Patient does note some fatigue.  Denies brain fog, skin or hair changes, constipation.

## 2022-07-20 NOTE — ASSESSMENT & PLAN NOTE
Chronic health problem.  Established with vascular medicine.  Rosuvastatin recently increased to 10 mg daily.

## 2022-07-20 NOTE — PROGRESS NOTES
CC: Lab review, medication refills    HISTORY OF THE PRESENT ILLNESS: Patient is a 56 y.o. female. This pleasant patient is here today for evaluation and management of the following health problems.    Health Maintenance: [facility] colon[facility] at [facility].  I have requested records in the past and not received them.  Will request records again.      Moderate persistent asthma without complication  This is a chronic health problem.  Has increase in cough since viral illness few months ago.  Symbicort twice daily.  Albuterol a couple times a week.  Has not tried Singulair in the past.    Insomnia  Chronic in nature.  Sleeping about 6 hours a night with use of Advil PM or melatonin.  Knee pain is also keeping her awake.  Please see additional notes.  Did try trazodone last year, does not remember why she did not keep taking it.    Dyslipidemia  Chronic health problem.  Established with vascular medicine.  Rosuvastatin recently increased to 10 mg daily.    Chronic pain of left knee  Left knee pain was getting better with PT and steroid injection.  However, in the last month knee pain is resuming.  Patient plans on calling [facility] for follow-up appointment and consideration of another injection.    Acquired hypothyroidism  Chronic health problem.  Taking levothyroxine 100 mcg daily.  TSH is mildly elevated.  Patient does note some fatigue.  Denies brain fog, skin or hair changes, constipation.      Allergies: Patient has no known allergies.    Current Outpatient Medications Ordered in Epic   Medication Sig Dispense Refill   • diclofenac DR (VOLTAREN) 75 MG Tablet Delayed Response Take 1 Tablet by mouth 2 times a day as needed (knee pain). 60 Tablet 2   • traZODone (DESYREL) 50 MG Tab Take 1 tab an hour before bed as needed for insomnia 30 Tablet 1   • levothyroxine (SYNTHROID) 112 MCG Tab Take one tab daily 1/2 hour prior to breakfast on an empty stomach. 90 Tablet 0   • budesonide-formoterol (SYMBICORT) 160-4.5  MCG/ACT Aerosol INHALE TWO PUFFS BY MOUTH TWICE A DAY. RINSE MOUTH AFTER EACH USE 6 g 11   • albuterol 108 (90 Base) MCG/ACT Aero Soln inhalation aerosol Inhale 1-2 Puffs every four hours as needed for Shortness of Breath. 1 Each 3   • ipratropium-albuterol (DUONEB) 0.5-2.5 (3) MG/3ML nebulizer solution Take 3 mL by nebulization 4 times a day. 100 Each 0   • montelukast (SINGULAIR) 10 MG Tab Take 1 Tablet by mouth every day. 90 Tablet 1   • rosuvastatin (CRESTOR) 10 MG Tab Take 1 Tablet by mouth every evening. 90 Tablet 1   • CALCIUM PO Take  by mouth.     • MILK THISTLE PO Take  by mouth.     • Cyanocobalamin (B-12 PO) Take  by mouth.     • TURMERIC PO Take  by mouth. Takes in liquid form     • Coenzyme Q10 (COQ10 PO) Take  by mouth. Taking in liquid form     • NATTOKINASE PO Take  by mouth.     • Cholecalciferol (VITAMIN D-3) 125 MCG (5000 UT) Tab Take  by mouth. 30 Tab      No current Epic-ordered facility-administered medications on file.       Past Medical History:   Diagnosis Date   • Anxiety    • Asthma    • Carotid artery narrowing    • Hyperlipidemia    • Hypothyroid    • Thyroid disease        No past surgical history on file.    Social History     Tobacco Use   • Smoking status: Never Smoker   • Smokeless tobacco: Never Used   Vaping Use   • Vaping Use: Never used   Substance Use Topics   • Alcohol use: Yes     Alcohol/week: 0.6 oz     Types: 1 Shots of liquor per week     Comment: every few days   • Drug use: No       Family History   Problem Relation Age of Onset   • Hyperlipidemia Mother         severe on statin    • No Known Problems Father    • Asthma Sister    • No Known Problems Maternal Grandmother    • No Known Problems Maternal Grandfather    • Stroke Neg Hx    • Heart Attack Neg Hx        ROS:   As in HPI, otherwise negative for chest pain, dyspnea, abdominal pain, dysuria, blood in stool, fever           Exam: /74   Pulse 89   Temp 36.4 °C (97.6 °F) (Temporal)   Resp 14   Ht 1.575 m  "(5' 2\")   Wt 68.9 kg (152 lb)   SpO2 97%  Body mass index is 27.8 kg/m².    General: Alert, pleasant, well nourished, well developed female in NAD  HEENT: Normocephalic. Eyes conjunctiva clear lids without ptosis, pupils equal and reactive to light, ears normal shape and contour, canals are clear bilaterally, tympanic membranes are pearly gray with good light reflex, nasal mucosa without erythema and drainage, oropharynx is without erythema, edema or exudates.   Neck: Supple without bruit. Thyroid is mildly enlarged and/or bilateral flank lymph nodes, nontender.  Pulmonary: Clear to ausculation.  Normal effort. No rales, ronchi, or wheezing.  Cardiovascular: Normal rate and rhythm without murmur. Carotid and radial pulses are intact and equal bilaterally.  No lower extremity edema.  Abdomen: Soft, nontender, nondistended. Normal bowel sounds. Liver and spleen are not palpable  Neurologic: Grossly nonfocal  Lymph: No cervical or supraclavicular lymph nodes are palpable  Skin: Warm and dry.  .  Musculoskeletal: Normal gait.   Psych: Normal mood and affect. Alert and oriented. Judgment and insight is normal.    Please note that this dictation was created using voice recognition software. I have made every reasonable attempt to correct obvious errors, but I expect that there are errors of grammar and possibly content that I did not discover before finalizing the note.      Assessment/Plan  1. Insomnia, unspecified type  Fairly well controlled with over-the-counter medication.  Patient will try trazodone again to see if she gets longer sleep.  - traZODone (DESYREL) 50 MG Tab; Take 1 tab an hour before bed as needed for insomnia  Dispense: 30 Tablet; Refill: 1    2. Hypothyroidism, unspecified type  Some fatigue.  TSH therapeutic range.  Will increase levothyroxine dose.  We will get updated thyroid levels within 3 months.  Difficult to tell if thyroid is mildly enlarged or has bilateral lymph node enlargement.  Patient " will continue to monitor.  Consider repeat ultrasound.  - levothyroxine (SYNTHROID) 112 MCG Tab; Take one tab daily 1/2 hour prior to breakfast on an empty stomach.  Dispense: 90 Tablet; Refill: 0    3. Moderate persistent asthma without complication  Somewhat uncontrolled.  We will add Singulair.  Continue with Symbicort.  Continue with albuterol and nebulizer as needed.  Has not had to use nebulizer in quite some time, but medication has .  - budesonide-formoterol (SYMBICORT) 160-4.5 MCG/ACT Aerosol; INHALE TWO PUFFS BY MOUTH TWICE A DAY. RINSE MOUTH AFTER EACH USE  Dispense: 6 g; Refill: 11  - albuterol 108 (90 Base) MCG/ACT Aero Soln inhalation aerosol; Inhale 1-2 Puffs every four hours as needed for Shortness of Breath.  Dispense: 1 Each; Refill: 3  - ipratropium-albuterol (DUONEB) 0.5-2.5 (3) MG/3ML nebulizer solution; Take 3 mL by nebulization 4 times a day.  Dispense: 100 Each; Refill: 0  - montelukast (SINGULAIR) 10 MG Tab; Take 1 Tablet by mouth every day.  Dispense: 90 Tablet; Refill: 1    4. Encounter for screening mammogram for breast cancer  Ordered  - MA-SCREENING MAMMO BILAT W/TOMOSYNTHESIS W/CAD; Future    5. Dyslipidemia  Continue follow-up with vascular medicine, rosuvastatin.    6. Chronic pain of left knee  Patient requesting refill of diclofenac.  She will call and schedule follow-up appointment with [facility] for another knee injection.  - diclofenac DR (VOLTAREN) 75 MG Tablet Delayed Response; Take 1 Tablet by mouth 2 times a day as needed (knee pain).  Dispense: 60 Tablet; Refill: 2    7. Acquired hypothyroidism  As in #2    Patient will return to clinic in 3 months for lab review and follow-up on asthma and hypothyroid.  Okay for appointment to be virtual.

## 2022-07-20 NOTE — ASSESSMENT & PLAN NOTE
Chronic in nature.  Sleeping about 6 hours a night with use of Advil PM or melatonin.  Knee pain is also keeping her awake.  Please see additional notes.  Did try trazodone last year, does not remember why she did not keep taking it.

## 2022-07-20 NOTE — ASSESSMENT & PLAN NOTE
This is a chronic health problem.  Has increase in cough since viral illness few months ago.  Symbicort twice daily.  Albuterol a couple times a week.  Has not tried Singulair in the past.

## 2022-07-20 NOTE — LETTER
[facility]  NAVID Zavala.  [address] [place] [address]  Fax: [phone]   Authorization for Release/Disclosure of   Protected Health Information   Name: ZEINAB GARCIA : 1965 SSN: [id number]   Address: [address] [place] [id number] Phone:    [phone] (home)    I authorize the entity listed below to release/disclose the PHI below to:   [facility]/NELL Zavala and NELL Zavala   0 Provider or Entity Name:[facility]        Address   City, State, Zip   Phone:      Fax:     Reason for request: continuity of care   Information to be released:    [x] LAST COLONOSCOPY,  including any PATH REPORT and follow-up  [  ] LAST FIT/COLOGUARD RESULT [  ] LAST DEXA  [  ] LAST MAMMOGRAM  [  ] LAST PAP  [  ] LAST LABS [  ] RETINA EXAM REPORT  [  ] IMMUNIZATION RECORDS  [  ] Release all info      [  ] Check here and initial the line next to each item to release ALL health information INCLUDING  _____ Care and treatment for drug and / or alcohol abuse  _____ HIV testing, infection status, or AIDS  _____ Genetic Testing    DATES OF SERVICE OR TIME PERIOD TO BE DISCLOSED: _____________  I understand and acknowledge that:  * This Authorization may be revoked at any time by you in writing, except if your health information has already been used or disclosed.  * Your health information that will be used or disclosed as a result of you signing this authorization could be re-disclosed by the recipient. If this occurs, your re-disclosed health information may no longer be protected by State or Federal laws.  * You may refuse to sign this Authorization. Your refusal will not affect your ability to obtain treatment.  * This Authorization becomes effective upon signing and will  on (date) __________.      If no date is indicated, this Authorization will  one (1) year from the signature date.    Name: Zeinab Garcia    Signature:   Date:      7/20/2022       PLEASE FAX REQUESTED RECORDS BACK TO: [phone]

## 2022-09-28 ENCOUNTER — NON-PROVIDER VISIT (OUTPATIENT)
Dept: VASCULAR LAB | Facility: MEDICAL CENTER | Age: 57
End: 2022-09-28
Attending: INTERNAL MEDICINE
Payer: COMMERCIAL

## 2022-09-28 ENCOUNTER — PATIENT MESSAGE (OUTPATIENT)
Dept: MEDICAL GROUP | Facility: PHYSICIAN GROUP | Age: 57
End: 2022-09-28

## 2022-09-28 DIAGNOSIS — E78.00 PRIMARY HYPERCHOLESTEROLEMIA: ICD-10-CM

## 2022-09-28 DIAGNOSIS — E03.9 HYPOTHYROIDISM, UNSPECIFIED TYPE: ICD-10-CM

## 2022-09-28 PROCEDURE — 99212 OFFICE O/P EST SF 10 MIN: CPT

## 2022-09-28 RX ORDER — ROSUVASTATIN CALCIUM 10 MG/1
10 TABLET, COATED ORAL EVERY EVENING
Qty: 90 TABLET | Refills: 3 | Status: SHIPPED
Start: 2022-09-28 | End: 2023-03-29

## 2022-09-28 NOTE — PROGRESS NOTES
[facility] - FollowUp Visit  Date of Service: 09/28/22    Kev Garcia is here for follow up of dyslipidemia.    Subjective    HPI  Pertinent Interval History since last visit:   Pt has been tolerating rosuvastatin 10 mg daily  Pt has made significant improvements w/ TLC  Current Prescription Lipid Lowering Medications - including dose:   Statin: Rosuvastatin 10mg QD  Non-Statin: None  Current Lipid Lowering and Related Supplements:   - continue vit D3 5000 units daily  - continue cholest-off   - consider berberine  Any Current Side Effects Potentially Related to Lipid Lowering therapy?   No  Current Adherence to Lipid Lowering Therapies:  Complete   Any Previous History of Statin Intolerance?   No  Baseline Lipids Prior to Treatment:      Ref. Range 12/9/2019 08:51   Cholesterol,Tot Latest Ref Range: 100 - 199 mg/dL 295 (H)   Triglycerides Latest Ref Range: 0 - 149 mg/dL 166 (H)   HDL Latest Ref Range: >=40 mg/dL 71   LDL Latest Ref Range: <100 mg/dL 191 (H)        SOCIAL HISTORY  Social History     Tobacco Use   Smoking Status Never   Smokeless Tobacco Never      Change in weight: Stable  Exercise habits: walk all day for work; ride bike 2-3x/week  Diet: quit snacking on escobar and sausage, limiting oil      Objective    There were no vitals filed for this visit.   Physical Exam    DATA REVIEW  Most Recent Lipid Panel:     Other Pertinent Blood Work:   Lab Results   Component Value Date    SODIUM 139 07/29/2021    POTASSIUM 4.4 07/29/2021    CHLORIDE 102 07/29/2021    CO2 24 07/29/2021    ANION 13.0 07/29/2021    GLUCOSE 111 (H) 07/29/2021    BUN 17 07/29/2021    CREATININE 0.47 (L) 07/29/2021    CALCIUM 9.2 07/29/2021    ASTSGOT 37 07/29/2021    ALTSGPT 39 07/29/2021    ALKPHOSPHAT 85 07/29/2021    TBILIRUBIN 0.5 07/29/2021    ALBUMIN 4.6 07/29/2021    AGRATIO 1.4 07/29/2021    LIPOPROTA 148 (H) 12/28/2020    TSHULTRASEN 1.350 10/26/2021    CPKTOTAL 184 (H) 12/28/2020       Other:  NA    Recent Imaging  Studies:    FINDINGS:     Coronary calcification:  LMA - 0.0  LCX - 0.0  LAD - 0.0  RCA - 0.0  PDA - 0.0     Total Calcium Score: 0.0    ASSESSMENT AND PLAN  Patient Type, check all that apply:   Primary Prevention  Established Atherosclerotic Cardiovascular Disease (ASCVD)  No  Other Established (non-atherosclerotic) Vascular Disease, if Present:    None  Evidence of Heterozygous Familial 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    FH genotyping:  Reviewed, will consider at future visits  ACC/AHA Indication for Statin Therapy, zelda all that apply:   LDL-C at baseline >190 mg/dl: Indication for High intensity statin -LDL reduction of >50% as primary target, and LDL-C <100.    - Prior LDL (not on meds) was in the 110-120s range, unclear why recent change, though polygenic FH is possible    Calculated Risk for ASCVD, if applicable:  N/A  Other Significant Risk Markers, if any, zelda all that apply:  -Total vasc screen, CIMT:  Normal, vasc age 53, 3% 10-yr risk   -CAC = 0, 7/2021 - indicating less intensive therapy recommended   -Lp(a) 148 - reviewed with patient this is an independent risk factor for ASCVD but is currently controversial if lowering has impact on outcomes in primary prevention, so targeting LDLc lowering is primary objective for now and consider lp(a) lowering if worsening ascvd risk or if ascvd event occurs - only currently available agents are pcsk9i and niacin.      -LDL-P >2000, small LDL-P 995.  LDL-P/LDL-C concordant = 12, so unlikely need to track LDL-P in the future despite her hypertrig      - will monitor direct LDL-C and apoB as well for now until stable and trigs stable  National Lipid Association (NLA) Goal (if applicable):  LDL-C:   <100 mg/dL  Lifestyle Recommendations From Today’s Visit:   Eating Plan: Concentrate on  Low sat/Trans fat and Low simple carb   - pt has made significant improvement in cutting down on  saturated fat + red meat  Exercise: Begin implementing consistent exercise plan.  Encouraged patient to keep low impact and begin with couple times per week, then begin building on frequency/duration  - pt to continue exercising on bike  Statin Recommendations from Today's Visit  Continue Rosuvastatin to 10mg QD  Non-Statin Medications Recommendations from Today’s Visit:   None  Indication for PCSK9 Inhibitor, if applicable:  Not currently indicated  Supplements Recommended at this visit:  - continue vit D3 5000 units daily  - continue cholest-off   - consider berberine  Recommendations for Other Cardiovascular Risk Factors, zelda all that apply:   BP at goal  Other Issues:  None    Patient LDL-C now at goal.    AST/ALT elevated d/t hx of hepatic steatosis - CTM  Denies any intolerances to statin therapy.  Will have pt continue w/ current regimen + TLC    Studies Ordered at Todays Visit:  None   Blood Work Ordered At Today’s visit:   Lipid Panel  CMP  Follow-Up:   6 months    Hazel Rocha, PharmD      CC:  NELL Zavala

## 2022-09-29 RX ORDER — LEVOTHYROXINE SODIUM 0.12 MG/1
TABLET ORAL
Qty: 90 TABLET | Refills: 0 | Status: SHIPPED | OUTPATIENT
Start: 2022-09-29 | End: 2022-10-25 | Stop reason: SDUPTHER

## 2022-10-25 ENCOUNTER — TELEMEDICINE (OUTPATIENT)
Dept: MEDICAL GROUP | Facility: PHYSICIAN GROUP | Age: 57
End: 2022-10-25
Payer: COMMERCIAL

## 2022-10-25 VITALS — HEART RATE: 81 BPM | BODY MASS INDEX: 28.35 KG/M2 | WEIGHT: 155 LBS

## 2022-10-25 DIAGNOSIS — E03.9 ACQUIRED HYPOTHYROIDISM: ICD-10-CM

## 2022-10-25 DIAGNOSIS — G47.00 INSOMNIA, UNSPECIFIED TYPE: ICD-10-CM

## 2022-10-25 DIAGNOSIS — E03.9 HYPOTHYROIDISM, UNSPECIFIED TYPE: ICD-10-CM

## 2022-10-25 DIAGNOSIS — R53.83 OTHER FATIGUE: ICD-10-CM

## 2022-10-25 PROCEDURE — 99214 OFFICE O/P EST MOD 30 MIN: CPT | Mod: 95 | Performed by: NURSE PRACTITIONER

## 2022-10-25 RX ORDER — LEVOTHYROXINE SODIUM 112 UG/1
TABLET ORAL
Qty: 30 TABLET | Refills: 5 | Status: SHIPPED | OUTPATIENT
Start: 2022-10-25 | End: 2023-06-02 | Stop reason: SDUPTHER

## 2022-10-25 NOTE — ASSESSMENT & PLAN NOTE
As a means of avoiding spread of COVID-19, this visit is being conducted by video.  This is a chronic health problem that is uncontrolled with current medications and lifestyle measures.  Patient reports can only sleep 4 to 6 hours a night.  Exhausted when gets home from work and goes to sleep at 7 PM, then wakes around midnight or 1 AM and cannot fall back asleep.  Tried trazodone, which helped a little, but still not able to sleep more than 6 hours.  Continues to alternate with Tylenol PM and melatonin.  Has tried CBD.  Drinking caffeine only in the early morning.  Very fatigued.  Has tried Lunesta or Ambien.

## 2022-10-26 NOTE — ASSESSMENT & PLAN NOTE
This is a chronic health problem that is well controlled with current medications and lifestyle measures.  Taking levothyroxine 125 mcg daily.  Dose was increased about 3 weeks ago based on lab results from 6/2022.  However, patient had had a more recent TSH done that was not available yet.  The most recent TSH is 0.66 which was based on 112 mcg daily dose.  Patient denies symptoms of over suppression including tachycardia, jitteriness.  Has chronic insomnia.  Has been having fatigue for a few months that she believes is related to chronic insomnia long hours at work.  Please see additional notes.

## 2022-10-26 NOTE — PROGRESS NOTES
Virtual Visit: Established Patient   This visit was conducted via Zoom using secure and encrypted videoconferencing technology.   The patient was in their home in the [facility].    The patient's identity was confirmed and verbal consent was obtained for this virtual visit.     Subjective:   CC:   Chief Complaint   Patient presents with    Follow-Up     Discuss thyroid       Chi Ana Paula Garcia is a 56 y.o. female presenting for evaluation and management of:    Insomnia  As a means of avoiding spread of COVID-19, this visit is being conducted by video.  This is a chronic health problem that is uncontrolled with current medications and lifestyle measures.  Patient reports can only sleep 4 to 6 hours a night.  Exhausted when gets home from work and goes to sleep at 7 PM, then wakes around midnight or 1 AM and cannot fall back asleep.  Tried trazodone, which helped a little, but still not able to sleep more than 6 hours.  Continues to alternate with Tylenol PM and melatonin.  Has tried CBD.  Drinking caffeine only in the early morning.  Very fatigued.  Has tried Lunesta or Ambien.      Acquired hypothyroidism  This is a chronic health problem that is well controlled with current medications and lifestyle measures.  Taking levothyroxine 125 mcg daily.  Dose was increased about 3 weeks ago based on lab results from 6/2022.  However, patient had had a more recent TSH done that was not available yet.  The most recent TSH is 0.66 which was based on 112 mcg daily dose.  Patient denies symptoms of over suppression including tachycardia, jitteriness.  Has chronic insomnia.  Has been having fatigue for a few months that she believes is related to chronic insomnia long hours at work.  Please see additional notes.     ROS   As in HPI, otherwise negative for chest pain, dyspnea, abdominal pain, dysuria, blood in stool, fever      Current medicines (including changes today)  Current Outpatient Medications   Medication Sig Dispense  Refill    levothyroxine (SYNTHROID) 112 MCG Tab Take one tab daily 1/2 hour prior to breakfast on an empty stomach. 30 Tablet 5    rosuvastatin (CRESTOR) 10 MG Tab Take 1 Tablet by mouth every evening. 90 Tablet 3    diclofenac DR (VOLTAREN) 75 MG Tablet Delayed Response Take 1 Tablet by mouth 2 times a day as needed (knee pain). 60 Tablet 2    traZODone (DESYREL) 50 MG Tab Take 1 tab an hour before bed as needed for insomnia 30 Tablet 1    budesonide-formoterol (SYMBICORT) 160-4.5 MCG/ACT Aerosol INHALE TWO PUFFS BY MOUTH TWICE A DAY. RINSE MOUTH AFTER EACH USE 6 g 11    albuterol 108 (90 Base) MCG/ACT Aero Soln inhalation aerosol Inhale 1-2 Puffs every four hours as needed for Shortness of Breath. 1 Each 3    ipratropium-albuterol (DUONEB) 0.5-2.5 (3) MG/3ML nebulizer solution Take 3 mL by nebulization 4 times a day. 100 Each 0    montelukast (SINGULAIR) 10 MG Tab Take 1 Tablet by mouth every day. 90 Tablet 1    CALCIUM PO Take  by mouth.      MILK THISTLE PO Take  by mouth.      Cyanocobalamin (B-12 PO) Take  by mouth.      TURMERIC PO Take  by mouth. Takes in liquid form      Coenzyme Q10 (COQ10 PO) Take  by mouth. Taking in liquid form      NATTOKINASE PO Take  by mouth.      Cholecalciferol (VITAMIN D-3) 125 MCG (5000 UT) Tab Take  by mouth. 30 Tab      No current facility-administered medications for this visit.       Patient Active Problem List    Diagnosis Date Noted    BMI 26.0-26.9,adult 08/20/2021    Chronic pain of left knee 08/17/2021    Insomnia 08/17/2021    Elevated lipoprotein(a) 12/30/2020    Primary hypercholesterolemia 12/30/2020    Seasonal allergies 12/10/2019    Hepatic steatosis 08/07/2019    Vitamin D deficiency 05/08/2018    Dyslipidemia 05/08/2018    Anxiety 04/17/2018    Acquired hypothyroidism 04/17/2018    Moderate persistent asthma without complication 02/10/2018        Objective:   Pulse 81   Wt 70.3 kg (155 lb) Comment: pt states  LMP 12/21/2018   BMI 28.35 kg/m²     Physical  Exam:  Constitutional: Alert, no distress, well-groomed.  Skin: No rashes in visible areas.  Eye: Round. Conjunctiva clear, lids normal. No icterus.   ENMT: Lips pink without lesions, good dentition, moist mucous membranes. Phonation normal.  Neck: No masses, no thyromegaly. Moves freely without pain.  Respiratory: Unlabored respiratory effort, no cough or audible wheeze  Psych: Alert and oriented x3, normal affect and mood.     Assessment and Plan:   The following treatment plan was discussed:   1. Insomnia, unspecified type  Controlled.  Discussed options including trying a medication such as Lunesta or Ambien.  Patient declines at this time.  Advised on moving bedtime later by 15 minutes every couple nights.  Also discussed referral to sleep psychology.  Patient is open to referral as long as it is well covered by insurance.  She will check with her insurance company.  - Referral to Psychology    2. Hypothyroidism, unspecified type  Was well controlled with levothyroxine 112 mcg daily.  Patient will change back to that dose.  We will get updated thyroid level in 6 weeks.  We will notify patient of lab results.  - levothyroxine (SYNTHROID) 112 MCG Tab; Take one tab daily 1/2 hour prior to breakfast on an empty stomach.  Dispense: 30 Tablet; Refill: 5  - TSH WITH REFLEX TO FT4; Future    3. Other fatigue    - CBC WITH DIFFERENTIAL; Future    4. Acquired hypothyroidism  As in #2      Follow-up: No follow-ups on file.

## 2022-11-23 ENCOUNTER — OFFICE VISIT (OUTPATIENT)
Dept: URGENT CARE | Facility: PHYSICIAN GROUP | Age: 57
End: 2022-11-23
Payer: COMMERCIAL

## 2022-11-23 VITALS
SYSTOLIC BLOOD PRESSURE: 122 MMHG | OXYGEN SATURATION: 99 % | BODY MASS INDEX: 28.52 KG/M2 | HEART RATE: 80 BPM | RESPIRATION RATE: 16 BRPM | TEMPERATURE: 97.1 F | WEIGHT: 155 LBS | DIASTOLIC BLOOD PRESSURE: 70 MMHG | HEIGHT: 62 IN

## 2022-11-23 DIAGNOSIS — R21 RASH: ICD-10-CM

## 2022-11-23 PROCEDURE — 99213 OFFICE O/P EST LOW 20 MIN: CPT | Performed by: FAMILY MEDICINE

## 2022-11-23 RX ORDER — GABAPENTIN 100 MG/1
300 CAPSULE ORAL
Qty: 21 CAPSULE | Refills: 0 | Status: SHIPPED | OUTPATIENT
Start: 2022-11-23 | End: 2022-11-30 | Stop reason: SDUPTHER

## 2022-11-23 RX ORDER — VALACYCLOVIR HYDROCHLORIDE 1 G/1
1000 TABLET, FILM COATED ORAL 3 TIMES DAILY
Qty: 21 TABLET | Refills: 0 | Status: SHIPPED | OUTPATIENT
Start: 2022-11-23 | End: 2022-11-30

## 2022-11-23 NOTE — PROGRESS NOTES
Subjective     Kev Garcia is a 57 y.o. female who presents with Rash (X 2 days on torso)      - This is a pleasant and nontoxic appearing 57 y.o. female who has come to the walk-in clinic today for:    #1) rash w/ pain x 1-2 days.  No NVFC.      ALLERGIES:  Patient has no known allergies.     PMH:  Past Medical History:   Diagnosis Date    Anxiety     Asthma     Carotid artery narrowing     Hyperlipidemia     Hypothyroid     Thyroid disease         PSH:  History reviewed. No pertinent surgical history.    MEDS:    Current Outpatient Medications:     valacyclovir (VALTREX) 1 GM Tab, Take 1 Tablet by mouth 3 times a day for 7 days., Disp: 21 Tablet, Rfl: 0    gabapentin (NEURONTIN) 100 MG Cap, Take 3 Capsules by mouth every evening as needed (pain) for up to 7 days., Disp: 21 Capsule, Rfl: 0    levothyroxine (SYNTHROID) 112 MCG Tab, Take one tab daily 1/2 hour prior to breakfast on an empty stomach., Disp: 30 Tablet, Rfl: 5    rosuvastatin (CRESTOR) 10 MG Tab, Take 1 Tablet by mouth every evening., Disp: 90 Tablet, Rfl: 3    diclofenac DR (VOLTAREN) 75 MG Tablet Delayed Response, Take 1 Tablet by mouth 2 times a day as needed (knee pain)., Disp: 60 Tablet, Rfl: 2    traZODone (DESYREL) 50 MG Tab, Take 1 tab an hour before bed as needed for insomnia, Disp: 30 Tablet, Rfl: 1    budesonide-formoterol (SYMBICORT) 160-4.5 MCG/ACT Aerosol, INHALE TWO PUFFS BY MOUTH TWICE A DAY. RINSE MOUTH AFTER EACH USE, Disp: 6 g, Rfl: 11    albuterol 108 (90 Base) MCG/ACT Aero Soln inhalation aerosol, Inhale 1-2 Puffs every four hours as needed for Shortness of Breath., Disp: 1 Each, Rfl: 3    ipratropium-albuterol (DUONEB) 0.5-2.5 (3) MG/3ML nebulizer solution, Take 3 mL by nebulization 4 times a day., Disp: 100 Each, Rfl: 0    montelukast (SINGULAIR) 10 MG Tab, Take 1 Tablet by mouth every day., Disp: 90 Tablet, Rfl: 1    CALCIUM PO, Take  by mouth., Disp: , Rfl:     MILK THISTLE PO, Take  by mouth., Disp: , Rfl:      "Cyanocobalamin (B-12 PO), Take  by mouth., Disp: , Rfl:     TURMERIC PO, Take  by mouth. Takes in liquid form, Disp: , Rfl:     Coenzyme Q10 (COQ10 PO), Take  by mouth. Taking in liquid form, Disp: , Rfl:     NATTOKINASE PO, Take  by mouth., Disp: , Rfl:     Cholecalciferol (VITAMIN D-3) 125 MCG (5000 UT) Tab, Take  by mouth., Disp: 30 Tab, Rfl:     ** I have documented what I find to be significant in regards to past medical, social, family and surgical history  in my HPI or under PMH/PSH/FH review section, otherwise it is noncontributory **         HPI    Review of Systems   Skin:  Positive for rash.   All other systems reviewed and are negative.           Objective     /70 (BP Location: Left arm, Patient Position: Sitting, BP Cuff Size: Adult)   Pulse 80   Temp 36.2 °C (97.1 °F) (Temporal)   Resp 16   Ht 1.575 m (5' 2\")   Wt 70.3 kg (155 lb)   LMP 12/21/2018   SpO2 99%   BMI 28.35 kg/m²      Physical Exam  Vitals and nursing note reviewed.   Constitutional:       General: She is not in acute distress.     Appearance: Normal appearance. She is well-developed.   HENT:      Head: Normocephalic.   Cardiovascular:      Heart sounds: Normal heart sounds. No murmur heard.  Pulmonary:      Effort: Pulmonary effort is normal. No respiratory distress.   Skin:     Findings: Rash (red patchy areas linear dermatome pattern on rt side back to rt chest w/ vesicles) present.   Neurological:      Mental Status: She is alert.      Motor: No abnormal muscle tone.   Psychiatric:         Mood and Affect: Mood normal.         Behavior: Behavior normal.                           Assessment & Plan       1. Shingles  valacyclovir (VALTREX) 1 GM Tab    gabapentin (NEURONTIN) 100 MG Cap          - Dx, plan & d/c instructions discussed   - Rest, stay hydrated  - OTC Tylenol as needed  - E.R. precautions discussed     Follow up with your regular primary care providers office for a recheck on today's visit. ER if not improving " in 2-3 days or if feeling/getting worse.     Any realistic side effects of medications that may have been given today reviewed.     Patient left in stable condition

## 2022-11-30 ENCOUNTER — OFFICE VISIT (OUTPATIENT)
Dept: MEDICAL GROUP | Facility: PHYSICIAN GROUP | Age: 57
End: 2022-11-30
Payer: COMMERCIAL

## 2022-11-30 VITALS
HEART RATE: 89 BPM | DIASTOLIC BLOOD PRESSURE: 80 MMHG | BODY MASS INDEX: 28.16 KG/M2 | TEMPERATURE: 97 F | HEIGHT: 62 IN | SYSTOLIC BLOOD PRESSURE: 120 MMHG | OXYGEN SATURATION: 95 % | WEIGHT: 153 LBS

## 2022-11-30 DIAGNOSIS — B02.23 NEUROPATHY DUE TO HERPES ZOSTER: ICD-10-CM

## 2022-11-30 DIAGNOSIS — R00.0 TACHYCARDIA: ICD-10-CM

## 2022-11-30 DIAGNOSIS — F41.9 ANXIETY: ICD-10-CM

## 2022-11-30 DIAGNOSIS — R21 RASH: ICD-10-CM

## 2022-11-30 PROCEDURE — 99214 OFFICE O/P EST MOD 30 MIN: CPT | Performed by: NURSE PRACTITIONER

## 2022-11-30 RX ORDER — GABAPENTIN 100 MG/1
300 CAPSULE ORAL
Qty: 21 CAPSULE | Refills: 0 | Status: SHIPPED | OUTPATIENT
Start: 2022-11-30 | End: 2022-12-07

## 2022-11-30 RX ORDER — TRAMADOL HYDROCHLORIDE 50 MG/1
50 TABLET ORAL EVERY 4 HOURS PRN
Qty: 30 TABLET | Refills: 0 | Status: SHIPPED | OUTPATIENT
Start: 2022-11-30 | End: 2022-12-14

## 2022-11-30 RX ORDER — HYDROXYZINE HYDROCHLORIDE 25 MG/1
12.5-25 TABLET, FILM COATED ORAL 3 TIMES DAILY PRN
Qty: 30 TABLET | Refills: 0 | Status: SHIPPED | OUTPATIENT
Start: 2022-11-30

## 2022-11-30 RX ORDER — VENLAFAXINE HYDROCHLORIDE 37.5 MG/1
CAPSULE, EXTENDED RELEASE ORAL
Qty: 60 CAPSULE | Refills: 1 | Status: SHIPPED | OUTPATIENT
Start: 2022-11-30

## 2022-11-30 ASSESSMENT — ANXIETY QUESTIONNAIRES
5. BEING SO RESTLESS THAT IT IS HARD TO SIT STILL: MORE THAN HALF THE DAYS
4. TROUBLE RELAXING: MORE THAN HALF THE DAYS
GAD7 TOTAL SCORE: 18
1. FEELING NERVOUS, ANXIOUS, OR ON EDGE: NEARLY EVERY DAY
2. NOT BEING ABLE TO STOP OR CONTROL WORRYING: NEARLY EVERY DAY
6. BECOMING EASILY ANNOYED OR IRRITABLE: NEARLY EVERY DAY
3. WORRYING TOO MUCH ABOUT DIFFERENT THINGS: NEARLY EVERY DAY
7. FEELING AFRAID AS IF SOMETHING AWFUL MIGHT HAPPEN: MORE THAN HALF THE DAYS

## 2022-11-30 NOTE — PATIENT INSTRUCTIONS
For anxiety:  Take hydroxyzine as needed for anxiety episode (up to 3 times a day).    Start venlafaxine (Effexor) daily.    For shingles pain:    Take tramadol as needed for severe pain (do not mix with alcohol).    Take gabapentin as needed for pain (in the evening)    Continue to take ibuprofen or tylenol.

## 2022-11-30 NOTE — ASSESSMENT & PLAN NOTE
Patient reports anxiety has been worsening in the last few months.  Has a lot going on.  Work life is stressful, home life is stressful, stressful relationship.  Waking in the middle of the night and cannot go back to sleep.  JESUS score is  Patient feels heart racing at times and then gets more anxious thinking she is going to have an anxiety attack.  Does get some pressure in her chest when sensation of heart racing.  Was taking sertraline in the past.  Has not taken it for couple years and been managing well.

## 2022-11-30 NOTE — PROGRESS NOTES
CC: Shingles pain, anxiety, racing heart    HISTORY OF THE PRESENT ILLNESS: Patient is a 57 y.o. female. This pleasant patient is here today for evaluation and management of the following health problems.    Health Maintenance: Patient reports colonoscopy done about 5 years ago.  She believes it was done at [facility].  We will request records.  Patient plans on having mammogram soone      Anxiety  Patient reports anxiety has been worsening in the last few months.  Has a lot going on.  Work life is stressful, home life is stressful, stressful relationship.  Waking in the middle of the night and cannot go back to sleep.  JESUS score is  Patient feels heart racing at times and then gets more anxious thinking she is going to have an anxiety attack.  Does get some pressure in her chest when sensation of heart racing.  Was taking sertraline in the past.  Has not taken it for couple years and been managing well.        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  Was evaluated in urgent care and received prescription for valacyclovir.  She is taking last pill today.  Rashes over right side torso.  Still very painful.  Hurts to lift arm.  Listers are crusting though still has a few active blisters.  Took gabapentin a couple times, thought it was to help with sleep.  It did not help with sleep so she discontinued.  She did not notice if it helped with pain.  Has been managing with ibuprofen for pain.  However, pain is waking her at night and her sleep is limited.    Allergies: Patient has no known allergies.    Current Outpatient Medications Ordered in Epic   Medication Sig Dispense Refill    traMADol (ULTRAM) 50 MG Tab Take 1 Tablet by mouth every four hours as needed for Severe Pain for up to 14 days. 30 Tablet 0    gabapentin (NEURONTIN) 100 MG Cap Take 3 Capsules by mouth every evening as needed (pain) for up to 7 days. 21 Capsule 0    venlafaxine XR (EFFEXOR XR) 37.5 MG CAPSULE SR 24 HR  Take 1 capsule once a day for 1 week, then increased to 2 tabs daily thereafter. 60 Capsule 1    hydrOXYzine HCl (ATARAX) 25 MG Tab Take 0.5-1 Tablets by mouth 3 times a day as needed for Anxiety. 30 Tablet 0    valacyclovir (VALTREX) 1 GM Tab Take 1 Tablet by mouth 3 times a day for 7 days. 21 Tablet 0    levothyroxine (SYNTHROID) 112 MCG Tab Take one tab daily 1/2 hour prior to breakfast on an empty stomach. 30 Tablet 5    rosuvastatin (CRESTOR) 10 MG Tab Take 1 Tablet by mouth every evening. 90 Tablet 3    diclofenac DR (VOLTAREN) 75 MG Tablet Delayed Response Take 1 Tablet by mouth 2 times a day as needed (knee pain). 60 Tablet 2    traZODone (DESYREL) 50 MG Tab Take 1 tab an hour before bed as needed for insomnia 30 Tablet 1    budesonide-formoterol (SYMBICORT) 160-4.5 MCG/ACT Aerosol INHALE TWO PUFFS BY MOUTH TWICE A DAY. RINSE MOUTH AFTER EACH USE 6 g 11    albuterol 108 (90 Base) MCG/ACT Aero Soln inhalation aerosol Inhale 1-2 Puffs every four hours as needed for Shortness of Breath. 1 Each 3    ipratropium-albuterol (DUONEB) 0.5-2.5 (3) MG/3ML nebulizer solution Take 3 mL by nebulization 4 times a day. 100 Each 0    montelukast (SINGULAIR) 10 MG Tab Take 1 Tablet by mouth every day. 90 Tablet 1    CALCIUM PO Take  by mouth.      MILK THISTLE PO Take  by mouth.      Cyanocobalamin (B-12 PO) Take  by mouth.      TURMERIC PO Take  by mouth. Takes in liquid form      Coenzyme Q10 (COQ10 PO) Take  by mouth. Taking in liquid form      NATTOKINASE PO Take  by mouth.      Cholecalciferol (VITAMIN D-3) 125 MCG (5000 UT) Tab Take  by mouth. 30 Tab      No current Epic-ordered facility-administered medications on file.       Past Medical History:   Diagnosis Date    Anxiety     Asthma     Carotid artery narrowing     Hyperlipidemia     Hypothyroid     Thyroid disease        No past surgical history on file.    Social History     Tobacco Use    Smoking status: Never    Smokeless tobacco: Never   Vaping Use    Vaping  "Use: Never used   Substance Use Topics    Alcohol use: Yes     Alcohol/week: 0.6 oz     Types: 1 Shots of liquor per week     Comment: every few days    Drug use: No       Family History   Problem Relation Age of Onset    Hyperlipidemia Mother         severe on statin     No Known Problems Father     Asthma Sister     No Known Problems Maternal Grandmother     No Known Problems Maternal Grandfather     Stroke Neg Hx     Heart Attack Neg Hx        ROS: As in HPI, otherwise negative for dyspnea, abdominal pain, dysuria, blood in stool, fever          Exam: /80   Pulse 89   Temp 36.1 °C (97 °F) (Temporal)   Ht 1.575 m (5' 2\")   Wt 69.4 kg (153 lb)   SpO2 95%  Body mass index is 27.98 kg/m².    General: Alert, delightful, well nourished, well developed female in NAD  HEENT: Normocephalic. Eyes conjunctiva clear lids without ptosis, pupils equal and reactive to light, ears normal shape and contour, canals are clear bilaterally, tympanic membranes are pearly gray with good light reflex, nasal mucosa without erythema and drainage, oropharynx is without erythema, edema or exudates.   Neck: Supple without bruit. Thyroid is not enlarged.  Pulmonary: Clear to ausculation.  Normal effort. No rales, ronchi, or wheezing.  Cardiovascular: Normal rate and rhythm without murmur. Carotid and radial pulses are intact and equal bilaterally.  No lower extremity edema.  Abdomen: Soft, nontender, nondistended. Normal bowel sounds. Liver and spleen are not palpable  Neurologic: Grossly nonfocal  Lymph: No cervical or supraclavicular lymph nodes are palpable  Skin: Warm and dry.  Right anterior torso to posterior torso with crusting lesions and a few remaining blisters, no surrounding erythema  Musculoskeletal: Normal gait.   Psych: Normal mood and affect. Alert and oriented. Judgment and insight is normal.    Please note that this dictation was created using voice recognition software. I have made every reasonable attempt to " correct obvious errors, but I expect that there are errors of grammar and possibly content that I did not discover before finalizing the note.      Assessment/Plan  1. Shingles  Shingles rash is improving.  Continues with pain.  We will continue with gabapentin as needed.  We will also prescribe tramadol to take for severe pain.   reviewed.  Risks of opioids reviewed with patient including respiratory depression.  She understands not to combine with alcohol.  She has signed controlled substance consent today.  Reassured patient that pain will slowly improve.  - gabapentin (NEURONTIN) 100 MG Cap; Take 3 Capsules by mouth every evening as needed (pain) for up to 7 days.  Dispense: 21 Capsule; Refill: 0    2. Anxiety  Uncontrolled.  We will start venlafaxine daily.  Titration instructions given to patient today.  We reviewed that medication takes 4 to 6 weeks to reach full effect, and that side effects of headache and nausea are temporary lasting about 2 weeks.    Will take hydroxyzine as needed.  Cautioned patient on drowsiness potential.  Appliance therapy.  Has had in the past and not a good experience.    - venlafaxine XR (EFFEXOR XR) 37.5 MG CAPSULE SR 24 HR; Take 1 capsule once a day for 1 week, then increased to 2 tabs daily thereafter.  Dispense: 60 Capsule; Refill: 1  - hydrOXYzine HCl (ATARAX) 25 MG Tab; Take 0.5-1 Tablets by mouth 3 times a day as needed for Anxiety.  Dispense: 30 Tablet; Refill: 0    3. 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  Dispense: 30 Tablet; Refill: 0  - Consent for Opiate Prescription    4. Tachycardia  Patient having episodes of tachycardia.  Uncertain if it is related to anxiety.  We will get Zio patch.  - [facility] ZIO PATCH MONITOR; Future      For anxiety:  Take hydroxyzine as needed for anxiety episode (up to 3 times a day).    Start venlafaxine (Effexor)  daily.    For shingles pain:    Take tramadol as needed for severe pain (do not mix with alcohol).    Take gabapentin as needed for pain (in the evening)    Continue to take ibuprofen or tylenol.    Patient will return to clinic in 6 weeks follow-up on anxiety or sooner if needed.

## 2022-11-30 NOTE — ASSESSMENT & PLAN NOTE
Patient has 1 week onset of shingles.  Was evaluated in urgent care and received prescription for valacyclovir.  She is taking last pill today.  Rashes over right side torso.  Still very painful.  Hurts to lift arm.  Listers are crusting though still has a few active blisters.  Took gabapentin a couple times, thought it was to help with sleep.  It did not help with sleep so she discontinued.  She did not notice if it helped with pain.  Has been managing with ibuprofen for pain.  However, pain is waking her at night and her sleep is limited.

## 2022-12-07 ENCOUNTER — TELEPHONE (OUTPATIENT)
Dept: MEDICAL GROUP | Facility: PHYSICIAN GROUP | Age: 57
End: 2022-12-07
Payer: COMMERCIAL

## 2022-12-07 NOTE — TELEPHONE ENCOUNTER
MEDICATION PRIOR AUTHORIZATION NEEDED:    1. Name of Medication: Tramadol 50    2. Requested By (Name of Pharmacy): [facility]     3. Is insurance on file current? yes    4. What is the name & phone number of the 3rd party payor? Covermymeds    Key: [id number]

## 2022-12-07 NOTE — TELEPHONE ENCOUNTER
ZEINAB CHRISTIAN Key: [id number] - [place] Case ID: [id number] - Rx #: [id number]    [place] APPROVED 12 7 2022          Requesting prior authorization for    PA Outcome    Quantity of  for a day supply of    Potential Alternatives are    Insurance    Reference #?   Dates in effect, from // through //  Pharmacy and phone number   Patient Copay

## 2022-12-12 ENCOUNTER — TELEPHONE (OUTPATIENT)
Dept: HEALTH INFORMATION MANAGEMENT | Facility: OTHER | Age: 57
End: 2022-12-12

## 2022-12-19 ENCOUNTER — OFFICE VISIT (OUTPATIENT)
Dept: URGENT CARE | Facility: PHYSICIAN GROUP | Age: 57
End: 2022-12-19
Payer: COMMERCIAL

## 2022-12-19 VITALS
DIASTOLIC BLOOD PRESSURE: 84 MMHG | HEIGHT: 62 IN | HEART RATE: 68 BPM | OXYGEN SATURATION: 99 % | WEIGHT: 146 LBS | BODY MASS INDEX: 26.87 KG/M2 | TEMPERATURE: 97.3 F | RESPIRATION RATE: 16 BRPM | SYSTOLIC BLOOD PRESSURE: 114 MMHG

## 2022-12-19 DIAGNOSIS — R42 VERTIGO: ICD-10-CM

## 2022-12-19 PROCEDURE — 99213 OFFICE O/P EST LOW 20 MIN: CPT

## 2022-12-19 RX ORDER — ONDANSETRON 4 MG/1
4 TABLET, ORALLY DISINTEGRATING ORAL EVERY 6 HOURS PRN
Qty: 15 TABLET | Refills: 0 | Status: SHIPPED | OUTPATIENT
Start: 2022-12-19

## 2022-12-19 RX ORDER — MECLIZINE HCL 12.5 MG/1
12.5 TABLET ORAL 3 TIMES DAILY PRN
Qty: 30 TABLET | Refills: 0 | Status: SHIPPED | OUTPATIENT
Start: 2022-12-19

## 2022-12-19 NOTE — LETTER
December 19, 2022    To Whom It May Concern:         This is confirmation that Kev Goss Bernyaj attended her scheduled appointment with NELL Oliveira on 12/19/22. Please excuse her from work 12/20/22-12/21/22.       If you have any questions please do not hesitate to call me at the phone number listed below.        Sincerely,          Ena Montoya A.P.R.N.  [phone]

## 2022-12-19 NOTE — PROGRESS NOTES
"Subjective:   Kev Garcia is a 57 y.o. female who presents for Dizziness (X 3 days with vomiting )      HPI: This is a 57-year-old female who presents today for evaluation of dizziness.  This is a new problem.  Patient reports developing dizziness with episodes of emesis x3 days.  Patient reports \"room spinning' sensation worsening with changes in head position.  She denies lightheadedness, chest pain, shortness of breath, blurred vision.  She denies underlying cardiovascular or neurological conditions.  She has not tried any treatments for her symptoms.  She has not experienced symptoms in the past.    Review of Systems   Eyes:  Negative for blurred vision, double vision, photophobia and pain.   Respiratory:  Negative for shortness of breath.    Cardiovascular:  Negative for chest pain.   Neurological:  Positive for dizziness. Negative for tingling, sensory change, focal weakness, weakness and headaches.     Medications:    Current Outpatient Medications on File Prior to Visit   Medication Sig Dispense Refill    venlafaxine XR (EFFEXOR XR) 37.5 MG CAPSULE SR 24 HR Take 1 capsule once a day for 1 week, then increased to 2 tabs daily thereafter. 60 Capsule 1    hydrOXYzine HCl (ATARAX) 25 MG Tab Take 0.5-1 Tablets by mouth 3 times a day as needed for Anxiety. 30 Tablet 0    levothyroxine (SYNTHROID) 112 MCG Tab Take one tab daily 1/2 hour prior to breakfast on an empty stomach. 30 Tablet 5    rosuvastatin (CRESTOR) 10 MG Tab Take 1 Tablet by mouth every evening. 90 Tablet 3    diclofenac DR (VOLTAREN) 75 MG Tablet Delayed Response Take 1 Tablet by mouth 2 times a day as needed (knee pain). 60 Tablet 2    traZODone (DESYREL) 50 MG Tab Take 1 tab an hour before bed as needed for insomnia 30 Tablet 1    budesonide-formoterol (SYMBICORT) 160-4.5 MCG/ACT Aerosol INHALE TWO PUFFS BY MOUTH TWICE A DAY. RINSE MOUTH AFTER EACH USE 6 g 11    albuterol 108 (90 Base) MCG/ACT Aero Soln inhalation aerosol Inhale 1-2 Puffs " "every four hours as needed for Shortness of Breath. 1 Each 3    montelukast (SINGULAIR) 10 MG Tab Take 1 Tablet by mouth every day. 90 Tablet 1    CALCIUM PO Take  by mouth.      MILK THISTLE PO Take  by mouth.      Cyanocobalamin (B-12 PO) Take  by mouth.      TURMERIC PO Take  by mouth. Takes in liquid form      Coenzyme Q10 (COQ10 PO) Take  by mouth. Taking in liquid form      NATTOKINASE PO Take  by mouth.      Cholecalciferol (VITAMIN D-3) 125 MCG (5000 UT) Tab Take  by mouth. 30 Tab     ipratropium-albuterol (DUONEB) 0.5-2.5 (3) MG/3ML nebulizer solution Take 3 mL by nebulization 4 times a day. (Patient not taking: Reported on 12/19/2022) 100 Each 0     No current facility-administered medications on file prior to visit.        Allergies:   Patient has no known allergies.    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    Past Social Hx:   Social History     Tobacco Use    Smoking status: Never    Smokeless tobacco: Never   Vaping Use    Vaping Use: Never used   Substance Use Topics    Alcohol use: Yes     Alcohol/week: 0.6 oz     Types: 1 Shots of liquor per week     Comment: every few days    Drug use: No          Problem list, medications, and allergies reviewed by myself today in Epic.     Objective:     /84 (BP Location: Right arm, Patient Position: Sitting, BP Cuff Size: Adult)   Pulse 68   Temp 36.3 °C (97.3 °F) (Temporal)   Resp 16   Ht 1.575 m (5' 2\")   Wt 66.2 kg (146 lb)   LMP 12/21/2018   SpO2 99%   BMI 26.70 kg/m²     Physical Exam  Vitals and nursing note reviewed.   Constitutional:       " General: She is not in acute distress.     Appearance: Normal appearance. She is normal weight. She is not ill-appearing, toxic-appearing or diaphoretic.   HENT:      Head: Normocephalic and atraumatic.      Right Ear: Tympanic membrane, ear canal and external ear normal. There is no impacted cerumen.      Left Ear: Tympanic membrane, ear canal and external ear normal. There is no impacted cerumen.      Nose: Nose normal.      Mouth/Throat:      Mouth: Mucous membranes are moist.      Pharynx: Oropharynx is clear. No oropharyngeal exudate or posterior oropharyngeal erythema.   Eyes:      Extraocular Movements: Extraocular movements intact.      Right eye: Nystagmus present.      Left eye: Nystagmus present.      Conjunctiva/sclera: Conjunctivae normal.      Pupils: Pupils are equal, round, and reactive to light.      Comments: + 2-3 sec rapid beats of nystagmus    Cardiovascular:      Rate and Rhythm: Normal rate and regular rhythm.      Pulses: Normal pulses.      Heart sounds: Normal heart sounds. No murmur heard.    No friction rub. No gallop.   Pulmonary:      Effort: Pulmonary effort is normal. No respiratory distress.      Breath sounds: Normal breath sounds. No stridor. No wheezing, rhonchi or rales.   Chest:      Chest wall: No tenderness.   Musculoskeletal:      Cervical back: Neck supple. No tenderness.   Lymphadenopathy:      Cervical: No cervical adenopathy.   Skin:     General: Skin is warm and dry.      Capillary Refill: Capillary refill takes less than 2 seconds.   Neurological:      General: No focal deficit present.      Mental Status: She is alert and oriented to person, place, and time. Mental status is at baseline.      GCS: GCS eye subscore is 4. GCS verbal subscore is 5. GCS motor subscore is 6.      Cranial Nerves: Cranial nerves 2-12 are intact. No cranial nerve deficit.      Motor: No weakness.      Coordination: Coordination is intact. Romberg sign negative.      Gait: Gait normal.    Psychiatric:         Mood and Affect: Mood normal.         Behavior: Behavior normal.         Thought Content: Thought content normal.         Judgment: Judgment normal.       Assessment/Plan:     Diagnosis and associated orders:   1. Vertigo  meclizine (ANTIVERT) 12.5 MG Tab    ondansetron (ZOFRAN ODT) 4 MG TABLET DISPERSIBLE    Referral to Physical Therapy             Comments/MDM:   Pt is clinically stable at today's acute urgent care visit.  No acute distress noted. Appropriate for outpatient management at this time.     Acute problem.  Patient's vital signs are stable in clinic today.  Physical exam findings and symptoms consistent with BPPV.  Discussed head maneuvers with patient, referral to PT for vestibular therapy, meclizine and Zofran prescribed for symptom relief.  Advised patient to stay well-hydrated, change positions slowly, use medications as prescribed.  She is to return to , or go to ER for any new or worsening signs or symptoms for any new or worsening signs or symptoms, follow-up with PT and PCP for recheck.  Patient is agreeable with plan of care verbalizes good understanding today.  Work note provided.           Discussed DDx, management options (risks,benefits, and alternatives to planned treatment), natural progression and supportive care.  Expressed understanding and the treatment plan was agreed upon. Questions were encouraged and answered   Return to urgent care prn if new or worsening sx or if there is no improvement in condition prn.    Educated in Red flags and indications to immediately call 911 or present to the Emergency Department.   Advised the patient to follow-up with the primary care physician for recheck, reevaluation, and consideration of further management.    I personally reviewed prior external notes and test results pertinent to today's visit.  I have independently reviewed and interpreted all diagnostics ordered during this urgent care acute visit.       Please note  that this dictation was created using voice recognition software. I have made a reasonable attempt to correct obvious errors, but I expect that there are errors of grammar and possibly content that I did not discover before finalizing the note.    This note was electronically signed by SAGRARIO Lorenzana

## 2022-12-22 ASSESSMENT — ENCOUNTER SYMPTOMS
HEADACHES: 0
EYE PAIN: 0
BLURRED VISION: 0
PHOTOPHOBIA: 0
WEAKNESS: 0
SENSORY CHANGE: 0
DIZZINESS: 1
FOCAL WEAKNESS: 0
SHORTNESS OF BREATH: 0
TINGLING: 0
DOUBLE VISION: 0

## 2023-01-11 ENCOUNTER — TELEMEDICINE (OUTPATIENT)
Dept: MEDICAL GROUP | Facility: PHYSICIAN GROUP | Age: 58
End: 2023-01-11
Payer: COMMERCIAL

## 2023-01-11 VITALS — HEIGHT: 62 IN | BODY MASS INDEX: 26.87 KG/M2 | WEIGHT: 146 LBS

## 2023-01-11 DIAGNOSIS — Z12.12 SCREENING FOR COLORECTAL CANCER: ICD-10-CM

## 2023-01-11 DIAGNOSIS — R74.01 TRANSAMINITIS: ICD-10-CM

## 2023-01-11 DIAGNOSIS — Z12.31 ENCOUNTER FOR SCREENING MAMMOGRAM FOR BREAST CANCER: ICD-10-CM

## 2023-01-11 DIAGNOSIS — B02.23 NEUROPATHY DUE TO HERPES ZOSTER: ICD-10-CM

## 2023-01-11 DIAGNOSIS — K76.0 HEPATIC STEATOSIS: ICD-10-CM

## 2023-01-11 DIAGNOSIS — Z12.11 SCREENING FOR COLORECTAL CANCER: ICD-10-CM

## 2023-01-11 DIAGNOSIS — B02.29 POST HERPETIC NEURALGIA: ICD-10-CM

## 2023-01-11 DIAGNOSIS — F41.9 ANXIETY: ICD-10-CM

## 2023-01-11 PROCEDURE — 99214 OFFICE O/P EST MOD 30 MIN: CPT | Mod: 95 | Performed by: NURSE PRACTITIONER

## 2023-01-11 RX ORDER — GABAPENTIN 100 MG/1
CAPSULE ORAL
Qty: 150 CAPSULE | Refills: 5 | Status: SHIPPED | OUTPATIENT
Start: 2023-01-11

## 2023-01-11 ASSESSMENT — PATIENT HEALTH QUESTIONNAIRE - PHQ9: CLINICAL INTERPRETATION OF PHQ2 SCORE: 0

## 2023-01-12 NOTE — ASSESSMENT & PLAN NOTE
Last liver enzymes elevated, AST 50 and ALT 54, scanned into chart and done in September 2021.  Patient wondering if repeat ultrasound would be helpful.  Denies abdominal pain.  Due for follow-up with GI.  Has upcoming CMP scheduled.

## 2023-01-12 NOTE — PROGRESS NOTES
Virtual Visit: Established Patient   This visit was conducted via Zoom using secure and encrypted videoconferencing technology.   The patient was in their home in the [facility].    The patient's identity was confirmed and verbal consent was obtained for this virtual visit.     Subjective:   CC:   Chief Complaint   Patient presents with    Follow-Up     Anxiety        Kev Garcia is a 57 y.o. female presenting for evaluation and management of:    Anxiety  As a means of avoiding spread of COVID-19, this visit is being conducted by video.  Last appointment 6 weeks ago, patient reported increased anxiety, nomi score was quite elevated.  Venlafaxine and hydroxyzine were prescribed.  However, patient reports she only took the venlafaxine for a couple days.  Unclear if she is tried the hydroxyzine.  Apparently she was confused on how she was supposed to be taking medications.  Reports anxiety has somewhat improved, now only occurring on some days.  Still occasionally affects her sleep.  Work is going a little better.    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  Patient reports shingles rash has healed.  Still having shooting pain over her right breast, especially over the nipple.  Sometimes it is constant though occurs randomly.  Will wake her up at night.  Has been wearing sports bra to see if this helps during the day.  Did take gabapentin a few times that allowed patient to sleep about 6 hours.    Hepatic steatosis  Last liver enzymes elevated, AST 50 and ALT 54, scanned into chart and done in September 2021.  Patient wondering if repeat ultrasound would be helpful.  Denies abdominal pain.  Due for follow-up with GI.  Has upcoming CMP scheduled.     ROS   As in HPI, otherwise negative for chest pain, dyspnea, abdominal pain, dysuria, blood in stool, fever      Current medicines (including changes today)  Current Outpatient Medications    Medication Sig Dispense Refill    gabapentin (NEURONTIN) 100 MG Cap Take 1 capsule every morning and 4 capsules an hour before every bedtime.  To help with hepatic neuralgia in breast. 150 Capsule 5    meclizine (ANTIVERT) 12.5 MG Tab Take 1 Tablet by mouth 3 times a day as needed for Dizziness. 30 Tablet 0    ondansetron (ZOFRAN ODT) 4 MG TABLET DISPERSIBLE Take 1 Tablet by mouth every 6 hours as needed for Nausea/Vomiting for up to 15 doses. 15 Tablet 0    venlafaxine XR (EFFEXOR XR) 37.5 MG CAPSULE SR 24 HR Take 1 capsule once a day for 1 week, then increased to 2 tabs daily thereafter. 60 Capsule 1    hydrOXYzine HCl (ATARAX) 25 MG Tab Take 0.5-1 Tablets by mouth 3 times a day as needed for Anxiety. 30 Tablet 0    levothyroxine (SYNTHROID) 112 MCG Tab Take one tab daily 1/2 hour prior to breakfast on an empty stomach. 30 Tablet 5    rosuvastatin (CRESTOR) 10 MG Tab Take 1 Tablet by mouth every evening. 90 Tablet 3    diclofenac DR (VOLTAREN) 75 MG Tablet Delayed Response Take 1 Tablet by mouth 2 times a day as needed (knee pain). 60 Tablet 2    traZODone (DESYREL) 50 MG Tab Take 1 tab an hour before bed as needed for insomnia 30 Tablet 1    budesonide-formoterol (SYMBICORT) 160-4.5 MCG/ACT Aerosol INHALE TWO PUFFS BY MOUTH TWICE A DAY. RINSE MOUTH AFTER EACH USE 6 g 11    albuterol 108 (90 Base) MCG/ACT Aero Soln inhalation aerosol Inhale 1-2 Puffs every four hours as needed for Shortness of Breath. 1 Each 3    ipratropium-albuterol (DUONEB) 0.5-2.5 (3) MG/3ML nebulizer solution Take 3 mL by nebulization 4 times a day. (Patient not taking: Reported on 12/19/2022) 100 Each 0    montelukast (SINGULAIR) 10 MG Tab Take 1 Tablet by mouth every day. 90 Tablet 1    CALCIUM PO Take  by mouth.      MILK THISTLE PO Take  by mouth.      Cyanocobalamin (B-12 PO) Take  by mouth.      TURMERIC PO Take  by mouth. Takes in liquid form      Coenzyme Q10 (COQ10 PO) Take  by mouth. Taking in liquid form      NATTOKINASE PO Take   "by mouth.      Cholecalciferol (VITAMIN D-3) 125 MCG (5000 UT) Tab Take  by mouth. 30 Tab      No current facility-administered medications for this visit.       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 11/30/2022    BMI 26.0-26.9,adult 08/20/2021    Chronic pain of left knee 08/17/2021    Insomnia 08/17/2021    Elevated lipoprotein(a) 12/30/2020    Primary hypercholesterolemia 12/30/2020    Seasonal allergies 12/10/2019    Hepatic steatosis 08/07/2019    Vitamin D deficiency 05/08/2018    Dyslipidemia 05/08/2018    Anxiety 04/17/2018    Acquired hypothyroidism 04/17/2018    Moderate persistent asthma without complication 02/10/2018        Objective:   Ht 1.575 m (5' 2\")   Wt 66.2 kg (146 lb) Comment: pt stated  LMP 12/21/2018   BMI 26.70 kg/m²     Physical Exam:  Constitutional: Alert, no distress, well-groomed.  Skin: No rashes in visible areas.  Eye: Round. Conjunctiva clear, lids normal. No icterus.   ENMT: Lips pink without lesions, good dentition, moist mucous membranes. Phonation normal.  Neck: No masses, no thyromegaly. Moves freely without pain.  Respiratory: Unlabored respiratory effort, no cough or audible wheeze  Psych: Alert and oriented x3, normal affect and mood.     Assessment and Plan:   The following treatment plan was discussed:   1. Anxiety  Improving.  Patient uncertain if she would like to start venlafaxine.  She is more open to trying hydroxyzine as needed.  Declining referral to therapist at this time.  She will continue to monitor.  She will start venlafaxine if anxiety continues to occur often.    2. Screening for colorectal cancer  Referred  - Referral to GI for Colonoscopy    3. Hepatic steatosis  History of hepatic steatosis, now with elevated liver enzymes.  We will repeat ultrasound.  Patient will follow up with GI.    4. Transaminitis  As in #3  - US-RUQ; Future    5. Encounter for screening mammogram for breast cancer  Patient plans on " getting mammogram done at mammogram by her work in February.  - MA-SCREENING MAMMO BILAT W/TOMOSYNTHESIS W/CAD; Future    6. Post herpetic neuralgia  Patient continues to have shooting pain across right breast.  Advised to take gabapentin more consistently and to start also taking it in the morning.  She will take 1 capsule in the morning and 4 capsules at bedtime.  Also advised on wearing sports bra to bed to see if this helps.  Advised patient that sometimes post neuralgia pain takes a little longer to go away.  It is reassuring that it has greatly improved already.  - gabapentin (NEURONTIN) 100 MG Cap; Take 1 capsule every morning and 4 capsules an hour before every bedtime.  To help with hepatic neuralgia in breast.  Dispense: 150 Capsule; Refill: 5    7. 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

## 2023-01-12 NOTE — ASSESSMENT & PLAN NOTE
As a means of avoiding spread of COVID-19, this visit is being conducted by video.  Patient reports shingles rash has healed.  Still having shooting pain over her right breast, especially over the nipple.  Sometimes it is constant though occurs randomly.  Will wake her up at night.  Has been wearing sports bra to see if this helps during the day.  Did take gabapentin a few times that allowed patient to sleep about 6 hours.

## 2023-01-12 NOTE — ASSESSMENT & PLAN NOTE
As a means of avoiding spread of COVID-19, this visit is being conducted by video.  Last appointment 6 weeks ago, patient reported increased anxiety, nomi score was quite elevated.  Venlafaxine and hydroxyzine were prescribed.  However, patient reports she only took the venlafaxine for a couple days.  Unclear if she is tried the hydroxyzine.  Apparently she was confused on how she was supposed to be taking medications.  Reports anxiety has somewhat improved, now only occurring on some days.  Still occasionally affects her sleep.  Work is going a little better.

## 2023-02-07 ENCOUNTER — HOSPITAL ENCOUNTER (OUTPATIENT)
Dept: RADIOLOGY | Facility: MEDICAL CENTER | Age: 58
End: 2023-02-07
Attending: NURSE PRACTITIONER
Payer: COMMERCIAL

## 2023-02-07 DIAGNOSIS — R74.01 TRANSAMINITIS: ICD-10-CM

## 2023-02-07 PROCEDURE — 76705 ECHO EXAM OF ABDOMEN: CPT

## 2023-03-29 ENCOUNTER — NON-PROVIDER VISIT (OUTPATIENT)
Dept: VASCULAR LAB | Facility: MEDICAL CENTER | Age: 58
End: 2023-03-29
Attending: INTERNAL MEDICINE
Payer: COMMERCIAL

## 2023-03-29 DIAGNOSIS — E78.5 DYSLIPIDEMIA: ICD-10-CM

## 2023-03-29 DIAGNOSIS — E78.00 PRIMARY HYPERCHOLESTEROLEMIA: ICD-10-CM

## 2023-03-29 DIAGNOSIS — E78.41 ELEVATED LIPOPROTEIN(A): ICD-10-CM

## 2023-03-29 PROCEDURE — 99212 OFFICE O/P EST SF 10 MIN: CPT

## 2023-03-29 RX ORDER — ROSUVASTATIN CALCIUM 20 MG/1
20 TABLET, COATED ORAL EVERY EVENING
Qty: 30 TABLET | Refills: 11 | Status: SHIPPED | OUTPATIENT
Start: 2023-03-29

## 2023-03-29 NOTE — PROGRESS NOTES
[facility] - FollowUp Visit  Date of Service: 03/29/23    ZEINAB CHRISTIAN is here for follow up of dyslipidemia.    Subjective    HPI  Pertinent Interval History since last visit:   No significant interval hx to note.  Current Prescription Lipid Lowering Medications - including dose:   Statin: Rosuvastatin 10 mg once daily  Non-Statin: None  Current Lipid Lowering and Related Supplements:   Vitamin D   Any Current Side Effects Potentially Related to Lipid Lowering therapy?   No  Current Adherence to Lipid Lowering Therapies:  Complete  Any Previous History of Statin Intolerance?   No  Baseline Lipids Prior to Treatment:       Ref. Range 12/9/2019 08:51   Cholesterol,Tot Latest Ref Range: 100 - 199 mg/dL 295 (H)   Triglycerides Latest Ref Range: 0 - 149 mg/dL 166 (H)   HDL Latest Ref Range: >=40 mg/dL 71   LDL Latest Ref Range: <100 mg/dL 191 (H)       SOCIAL HISTORY  Social History     Tobacco Use   Smoking Status Never   Smokeless Tobacco Never      Change in weight:  Pt has gained ~ 5 lbs since last appt.  Exercise habits: moderate regular exercise program - walk all day for work; ride bike 2-3x/week.  Diet: low fat. Cut down on cheese and butter since last visit.      Objective    There were no vitals filed for this visit.   Physical Exam    DATA REVIEW  Most Recent Lipid Panel:       Other Pertinent Blood Work:   Lab Results   Component Value Date    SODIUM 139 07/29/2021    POTASSIUM 4.4 07/29/2021    CHLORIDE 102 07/29/2021    CO2 24 07/29/2021    ANION 13.0 07/29/2021    GLUCOSE 111 (H) 07/29/2021    BUN 17 07/29/2021    CREATININE 0.47 (L) 07/29/2021    CALCIUM 9.2 07/29/2021    ASTSGOT 37 07/29/2021    ALTSGPT 39 07/29/2021    ALKPHOSPHAT 85 07/29/2021    TBILIRUBIN 0.5 07/29/2021    ALBUMIN 4.6 07/29/2021    AGRATIO 1.4 07/29/2021    TSHULTRASEN 1.350 10/26/2021       7/2021 cardiac CT  Coronary calcification:  LMA - 0.0  LCX - 0.0  LAD - 0.0  RCA - 0.0  PDA - 0.0     Total Calcium Score:  0.0    Other:  NA    Recent Imaging Studies:    None since last visit        ASSESSMENT AND PLAN  Patient Type, check all that apply:   Primary Prevention  Established Atherosclerotic Cardiovascular Disease (ASCVD)  No  Other Established (non-atherosclerotic) Vascular Disease, if Present:    None  Evidence of Heterozygous Familial 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    FH genotyping:  Reviewed, will consider at future visits  ACC/AHA Indication for Statin Therapy, zelda all that apply:   LDL-C at baseline >190 mg/dl: Indication for High intensity statin -LDL reduction of >50% as primary target, and LDL-C <100.    - Prior LDL (not on meds) was in the 110-120s range, unclear why recent change, though polygenic FH is possible    Calculated Risk for ASCVD, if applicable:  N/A  Other Significant Risk Markers, if any, zelda all that apply:  -Total vasc screen, CIMT:  Normal, vasc age 53, 3% 10-yr risk   -CAC = 0, 7/2021 - indicating less intensive therapy recommended   -Lp(a) 148 - reviewed with patient this is an independent risk factor for ASCVD but is currently controversial if lowering has impact on outcomes in primary prevention, so targeting LDLc lowering is primary objective for now and consider lp(a) lowering if worsening ascvd risk or if ascvd event occurs - only currently available agents are pcsk9i and niacin.      -LDL-P >2000, small LDL-P 995.  LDL-P/LDL-C concordant = 12, so unlikely need to track LDL-P in the future despite her hypertrig      - will monitor direct LDL-C and apoB as well for now until stable and trigs stable  National Lipid Association (NLA) Goal (if applicable):  LDL-C:   <100 mg/dL  Lifestyle Recommendations From Today’s Visit:   Eating Plan: Concentrate on  Low sat/Trans fat and Low simple carb, DASH Diet  - pt has made significant improvement in cutting down on saturated fat + red meat  Exercise: Begin implementing  consistent exercise plan.  Encouraged patient to keep low impact and begin with couple times per week, then begin building on frequency/duration  - pt to continue exercising on bike  - Pt has cut down on her EtOH consumption  Statin Recommendations from Today's Visit  INCREASE rosuvastatin up to 20 mg once daily  Non-Statin Medications Recommendations from Today’s Visit:   None  Indication for PCSK9 Inhibitor, if applicable:  Not currently indicated  Supplements Recommended at this visit:  None  Other Issues:  Pt presents to clinic doing well. She has had no issues w/ her statin thus far.   Pt has Dc'd the majority of her supplements aside from vitamin D - states she uses sporadically.  Pt goes to [facility] for her labs - requested most recent results from [facility].   Noted pt's LDL has increased from 71 to 93, but remains w/in goal of < 100. Pt reports compliance w/ statin medication w/ no missed doses.  Used shared decision making w/ pt - we will increase her statin up to 20 mg once daily (now HI) w/ repeat lipid panel in ~ 8 weeks.    Studies Ordered at Todays Visit:  None   Blood Work Ordered At Today’s visit:   CMP & lipid panel  Follow-Up:   2 months    Christopher Porras, PharmD, BCACP    CC:  NAVID Zavala.  Holly Tineo, *

## 2023-03-29 NOTE — PROGRESS NOTES
[facility] - Follow[facility] Visit  Date of Service: 3/29/23    Kev Garcia is here for follow up of dyslipidemia.    Subjective    HPI  Pertinent Interval History since last visit:   Pt has been tolerating rosuvastatin 10 mg daily  Pt has made significant improvements w/ TLC  Current Prescription Lipid Lowering Medications - including dose:   Statin: Rosuvastatin 10mg QD  Non-Statin: None  Current Lipid Lowering and Related Supplements:   - continue vit D3 5000 units daily  - continue cholest-off   - berberine ***  Any Current Side Effects Potentially Related to Lipid Lowering therapy?   No  Current Adherence to Lipid Lowering Therapies:  Complete   Any Previous History of Statin Intolerance?   No  Baseline Lipids Prior to Treatment:      Ref. Range 12/9/2019 08:51   Cholesterol,Tot Latest Ref Range: 100 - 199 mg/dL 295 (H)   Triglycerides Latest Ref Range: 0 - 149 mg/dL 166 (H)   HDL Latest Ref Range: >=40 mg/dL 71   LDL Latest Ref Range: <100 mg/dL 191 (H)        SOCIAL HISTORY  Social History     Tobacco Use   Smoking Status Never   Smokeless Tobacco Never      Change in weight: Stable  Exercise habits: walk all day for work; ride bike 2-3x/week  Diet: quit snacking on escobar and sausage, limiting oil      Objective    There were no vitals filed for this visit.   Physical Exam    DATA REVIEW  Most Recent Lipid Panel:     Other Pertinent Blood Work:   Lab Results   Component Value Date    SODIUM 139 07/29/2021    POTASSIUM 4.4 07/29/2021    CHLORIDE 102 07/29/2021    CO2 24 07/29/2021    ANION 13.0 07/29/2021    GLUCOSE 111 (H) 07/29/2021    BUN 17 07/29/2021    CREATININE 0.47 (L) 07/29/2021    CALCIUM 9.2 07/29/2021    ASTSGOT 37 07/29/2021    ALTSGPT 39 07/29/2021    ALKPHOSPHAT 85 07/29/2021    TBILIRUBIN 0.5 07/29/2021    ALBUMIN 4.6 07/29/2021    AGRATIO 1.4 07/29/2021    TSHULTRASEN 1.350 10/26/2021       Other:  NA    Recent Imaging Studies:    FINDINGS:     Coronary calcification:  LMA - 0.0  LCX -  0.0  LAD - 0.0  RCA - 0.0  PDA - 0.0     Total Calcium Score: 0.0    ASSESSMENT AND PLAN  Patient Type, check all that apply:   Primary Prevention  Established Atherosclerotic Cardiovascular Disease (ASCVD)  No  Other Established (non-atherosclerotic) Vascular Disease, if Present:    None  Evidence of Heterozygous Familial 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    FH genotyping:  Reviewed, will consider at future visits  ACC/AHA Indication for Statin Therapy, zelda all that apply:   LDL-C at baseline >190 mg/dl: Indication for High intensity statin -LDL reduction of >50% as primary target, and LDL-C <100.    - Prior LDL (not on meds) was in the 110-120s range, unclear why recent change, though polygenic FH is possible    Calculated Risk for ASCVD, if applicable:  N/A  Other Significant Risk Markers, if any, zelda all that apply:  -Total vasc screen, CIMT:  Normal, vasc age 53, 3% 10-yr risk   -CAC = 0, 7/2021 - indicating less intensive therapy recommended   -Lp(a) 148 - reviewed with patient this is an independent risk factor for ASCVD but is currently controversial if lowering has impact on outcomes in primary prevention, so targeting LDLc lowering is primary objective for now and consider lp(a) lowering if worsening ascvd risk or if ascvd event occurs - only currently available agents are pcsk9i and niacin.      -LDL-P >2000, small LDL-P 995.  LDL-P/LDL-C concordant = 12, so unlikely need to track LDL-P in the future despite her hypertrig      - will monitor direct LDL-C and apoB as well for now until stable and trigs stable  National Lipid Association (NLA) Goal (if applicable):  LDL-C:   <100 mg/dL  Lifestyle Recommendations From Today’s Visit:   Eating Plan: Concentrate on  Low sat/Trans fat and Low simple carb   - pt has made significant improvement in cutting down on saturated fat + red meat  Exercise: Begin implementing consistent  exercise plan.  Encouraged patient to keep low impact and begin with couple times per week, then begin building on frequency/duration  - pt to continue exercising on bike  Statin Recommendations from Today's Visit  Continue Rosuvastatin to 10mg QD  Non-Statin Medications Recommendations from Today’s Visit:   None  Indication for PCSK9 Inhibitor, if applicable:  Not currently indicated  Supplements Recommended at this visit:  - continue vit D3 5000 units daily  - continue cholest-off   - consider berberine  Recommendations for Other Cardiovascular Risk Factors, zelda all that apply:   BP at goal  Other Issues:  None    Patient LDL-C now at goal.    AST/ALT elevated d/t hx of hepatic steatosis - CTM  Denies any intolerances to statin therapy.  Will have pt continue w/ current regimen + TLC    Studies Ordered at Todays Visit:  None   Blood Work Ordered At Today’s visit:   Lipid Panel  CMP  Follow-Up:   6 months    Zuleima Akhtar, PharmD      CC:  NELL Zavala

## 2023-06-02 DIAGNOSIS — E03.9 HYPOTHYROIDISM, UNSPECIFIED TYPE: ICD-10-CM

## 2023-06-02 RX ORDER — LEVOTHYROXINE SODIUM 112 UG/1
TABLET ORAL
Qty: 30 TABLET | Refills: 5 | Status: SHIPPED | OUTPATIENT
Start: 2023-06-02

## 2023-06-02 NOTE — TELEPHONE ENCOUNTER
Last ov 1/11/23    Received request via: Pharmacy    Was the patient seen in the last year in this department? Yes    Does the patient have an active prescription (recently filled or refills available) for medication(s) requested? No    Does the patient have intermediate Plus and need 100 day supply (blood pressure, diabetes and cholesterol meds only)? Patient does not have SCP

## 2023-08-25 NOTE — TELEPHONE ENCOUNTER
Received request via: Pharmacy    Was the patient seen in the last year in this department? Yes    Does the patient have an active prescription (recently filled or refills available) for medication(s) requested? No   7 (severe pain)

## 2024-03-26 ENCOUNTER — APPOINTMENT (OUTPATIENT)
Dept: RADIOLOGY | Facility: MEDICAL CENTER | Age: 59
End: 2024-03-26
Payer: COMMERCIAL

## 2024-04-12 ENCOUNTER — HOSPITAL ENCOUNTER (OUTPATIENT)
Dept: RADIOLOGY | Facility: MEDICAL CENTER | Age: 59
End: 2024-04-12
Payer: COMMERCIAL

## 2024-04-12 DIAGNOSIS — R74.01 NONSPECIFIC ELEVATION OF LEVELS OF TRANSAMINASE OR LACTIC ACID DEHYDROGENASE (LDH): ICD-10-CM

## 2024-04-12 DIAGNOSIS — R74.02 NONSPECIFIC ELEVATION OF LEVELS OF TRANSAMINASE OR LACTIC ACID DEHYDROGENASE (LDH): ICD-10-CM

## 2024-04-12 PROCEDURE — 76700 US EXAM ABDOM COMPLETE: CPT

## 2024-04-18 PROBLEM — M17.12 OSTEOARTHRITIS OF LEFT KNEE: Status: ACTIVE | Noted: 2024-04-18

## 2024-10-10 ENCOUNTER — PATIENT MESSAGE (OUTPATIENT)
Dept: HEALTH INFORMATION MANAGEMENT | Facility: OTHER | Age: 59
End: 2024-10-10

## 2025-01-10 ENCOUNTER — HOSPITAL ENCOUNTER (OUTPATIENT)
Dept: LAB | Facility: MEDICAL CENTER | Age: 60
End: 2025-01-10
Attending: FAMILY MEDICINE
Payer: COMMERCIAL

## 2025-01-10 LAB
ALBUMIN SERPL BCP-MCNC: 4.4 G/DL (ref 3.2–4.9)
ALBUMIN/GLOB SERPL: 1.4 G/DL
ALP SERPL-CCNC: 80 U/L (ref 30–99)
ALT SERPL-CCNC: 31 U/L (ref 2–50)
ANION GAP SERPL CALC-SCNC: 12 MMOL/L (ref 7–16)
AST SERPL-CCNC: 30 U/L (ref 12–45)
BILIRUB SERPL-MCNC: 0.4 MG/DL (ref 0.1–1.5)
BUN SERPL-MCNC: 12 MG/DL (ref 8–22)
CALCIUM ALBUM COR SERPL-MCNC: 9 MG/DL (ref 8.5–10.5)
CALCIUM SERPL-MCNC: 9.3 MG/DL (ref 8.5–10.5)
CHLORIDE SERPL-SCNC: 103 MMOL/L (ref 96–112)
CHOLEST SERPL-MCNC: 185 MG/DL (ref 100–199)
CO2 SERPL-SCNC: 25 MMOL/L (ref 20–33)
CREAT SERPL-MCNC: 0.47 MG/DL (ref 0.5–1.4)
EST. AVERAGE GLUCOSE BLD GHB EST-MCNC: 111 MG/DL
FASTING STATUS PATIENT QL REPORTED: NORMAL
GFR SERPLBLD CREATININE-BSD FMLA CKD-EPI: 110 ML/MIN/1.73 M 2
GLOBULIN SER CALC-MCNC: 3.1 G/DL (ref 1.9–3.5)
GLUCOSE SERPL-MCNC: 108 MG/DL (ref 65–99)
HBA1C MFR BLD: 5.5 % (ref 4–5.6)
HDLC SERPL-MCNC: 71 MG/DL
LDLC SERPL CALC-MCNC: 92 MG/DL
POTASSIUM SERPL-SCNC: 4.7 MMOL/L (ref 3.6–5.5)
PROT SERPL-MCNC: 7.5 G/DL (ref 6–8.2)
SODIUM SERPL-SCNC: 140 MMOL/L (ref 135–145)
T4 FREE SERPL-MCNC: 1.93 NG/DL (ref 0.93–1.7)
TRIGL SERPL-MCNC: 111 MG/DL (ref 0–149)
TSH SERPL-ACNC: 2.25 UIU/ML (ref 0.35–5.5)

## 2025-01-10 PROCEDURE — 84439 ASSAY OF FREE THYROXINE: CPT

## 2025-01-10 PROCEDURE — 80061 LIPID PANEL: CPT

## 2025-01-10 PROCEDURE — 36415 COLL VENOUS BLD VENIPUNCTURE: CPT

## 2025-01-10 PROCEDURE — 83036 HEMOGLOBIN GLYCOSYLATED A1C: CPT

## 2025-01-10 PROCEDURE — 80053 COMPREHEN METABOLIC PANEL: CPT

## 2025-01-10 PROCEDURE — 84443 ASSAY THYROID STIM HORMONE: CPT

## 2025-03-03 ENCOUNTER — HOSPITAL ENCOUNTER (OUTPATIENT)
Dept: RADIOLOGY | Facility: MEDICAL CENTER | Age: 60
End: 2025-03-03
Attending: FAMILY MEDICINE
Payer: COMMERCIAL

## 2025-03-03 DIAGNOSIS — R10.11 ABDOMINAL PAIN, RIGHT UPPER QUADRANT: ICD-10-CM

## 2025-03-03 PROCEDURE — 76705 ECHO EXAM OF ABDOMEN: CPT

## 2025-04-08 ENCOUNTER — HOSPITAL ENCOUNTER (OUTPATIENT)
Dept: RADIOLOGY | Facility: MEDICAL CENTER | Age: 60
End: 2025-04-08
Attending: FAMILY MEDICINE
Payer: COMMERCIAL

## 2025-04-08 DIAGNOSIS — R10.11 ABDOMINAL PAIN, RIGHT UPPER QUADRANT: ICD-10-CM
